# Patient Record
Sex: FEMALE | Race: WHITE | NOT HISPANIC OR LATINO | Employment: FULL TIME | URBAN - METROPOLITAN AREA
[De-identification: names, ages, dates, MRNs, and addresses within clinical notes are randomized per-mention and may not be internally consistent; named-entity substitution may affect disease eponyms.]

---

## 2017-10-11 ENCOUNTER — TRANSCRIBE ORDERS (OUTPATIENT)
Dept: ADMINISTRATIVE | Facility: HOSPITAL | Age: 42
End: 2017-10-11

## 2017-10-11 DIAGNOSIS — Z12.31 ENCOUNTER FOR MAMMOGRAM TO ESTABLISH BASELINE MAMMOGRAM: Primary | ICD-10-CM

## 2017-10-30 ENCOUNTER — APPOINTMENT (OUTPATIENT)
Dept: MAMMOGRAPHY | Facility: HOSPITAL | Age: 42
End: 2017-10-30
Attending: INTERNAL MEDICINE

## 2017-11-03 ENCOUNTER — HOSPITAL ENCOUNTER (OUTPATIENT)
Dept: MAMMOGRAPHY | Facility: HOSPITAL | Age: 42
Discharge: HOME | End: 2017-11-03
Attending: INTERNAL MEDICINE | Admitting: INTERNAL MEDICINE

## 2017-11-03 DIAGNOSIS — Z12.31 ENCOUNTER FOR MAMMOGRAM TO ESTABLISH BASELINE MAMMOGRAM: ICD-10-CM

## 2017-11-03 PROCEDURE — G0202 SCR MAMMO BI INCL CAD: HCPCS

## 2017-11-13 ENCOUNTER — APPOINTMENT (OUTPATIENT)
Dept: GENERAL RADIOLOGY | Facility: HOSPITAL | Age: 42
End: 2017-11-13

## 2017-11-13 PROCEDURE — 71020 XR CHEST 2 VW: CPT | Performed by: FAMILY MEDICINE

## 2017-11-13 PROCEDURE — 71020 HC CHEST PA AND LATERAL: CPT | Performed by: FAMILY MEDICINE

## 2018-05-11 ENCOUNTER — OFFICE VISIT (OUTPATIENT)
Dept: FAMILY MEDICINE CLINIC | Facility: CLINIC | Age: 43
End: 2018-05-11

## 2018-05-11 VITALS
TEMPERATURE: 98.4 F | DIASTOLIC BLOOD PRESSURE: 88 MMHG | SYSTOLIC BLOOD PRESSURE: 120 MMHG | WEIGHT: 256 LBS | HEART RATE: 95 BPM | OXYGEN SATURATION: 100 % | HEIGHT: 67 IN | BODY MASS INDEX: 40.18 KG/M2 | RESPIRATION RATE: 16 BRPM

## 2018-05-11 DIAGNOSIS — IMO0001 CLASS 3 OBESITY WITHOUT SERIOUS COMORBIDITY WITH BODY MASS INDEX (BMI) OF 40.0 TO 44.9 IN ADULT, UNSPECIFIED OBESITY TYPE: ICD-10-CM

## 2018-05-11 DIAGNOSIS — Z83.71 FAMILY HISTORY OF COLONIC POLYPS: ICD-10-CM

## 2018-05-11 DIAGNOSIS — R00.2 PALPITATIONS: ICD-10-CM

## 2018-05-11 DIAGNOSIS — G43.109 MIGRAINE WITH AURA AND WITHOUT STATUS MIGRAINOSUS, NOT INTRACTABLE: ICD-10-CM

## 2018-05-11 DIAGNOSIS — N92.6 IRREGULAR MENSES: ICD-10-CM

## 2018-05-11 DIAGNOSIS — I10 ESSENTIAL HYPERTENSION: Primary | ICD-10-CM

## 2018-05-11 LAB
ALBUMIN SERPL-MCNC: 4.7 G/DL (ref 3.5–5.2)
ALBUMIN/GLOB SERPL: 1.6 G/DL
ALP SERPL-CCNC: 94 U/L (ref 39–117)
ALT SERPL-CCNC: 13 U/L (ref 1–33)
AST SERPL-CCNC: 13 U/L (ref 1–32)
BASOPHILS # BLD AUTO: 0.03 10*3/MM3 (ref 0–0.2)
BASOPHILS NFR BLD AUTO: 0.4 % (ref 0–1.5)
BILIRUB SERPL-MCNC: 0.2 MG/DL (ref 0.1–1.2)
BUN SERPL-MCNC: 7 MG/DL (ref 6–20)
BUN/CREAT SERPL: 11.7 (ref 7–25)
CALCIUM SERPL-MCNC: 9.4 MG/DL (ref 8.6–10.5)
CHLORIDE SERPL-SCNC: 101 MMOL/L (ref 98–107)
CHOLEST SERPL-MCNC: 198 MG/DL (ref 0–200)
CO2 SERPL-SCNC: 27.6 MMOL/L (ref 22–29)
CREAT SERPL-MCNC: 0.6 MG/DL (ref 0.57–1)
EOSINOPHIL # BLD AUTO: 0.32 10*3/MM3 (ref 0–0.7)
EOSINOPHIL NFR BLD AUTO: 4.3 % (ref 0.3–6.2)
ERYTHROCYTE [DISTWIDTH] IN BLOOD BY AUTOMATED COUNT: 15.2 % (ref 11.7–13)
GFR SERPLBLD CREATININE-BSD FMLA CKD-EPI: 110 ML/MIN/1.73
GFR SERPLBLD CREATININE-BSD FMLA CKD-EPI: 134 ML/MIN/1.73
GLOBULIN SER CALC-MCNC: 2.9 GM/DL
GLUCOSE SERPL-MCNC: 87 MG/DL (ref 65–99)
HCT VFR BLD AUTO: 37.6 % (ref 35.6–45.5)
HDLC SERPL-MCNC: 52 MG/DL (ref 40–60)
HGB BLD-MCNC: 11.2 G/DL (ref 11.9–15.5)
IMM GRANULOCYTES # BLD: 0 10*3/MM3 (ref 0–0.03)
IMM GRANULOCYTES NFR BLD: 0 % (ref 0–0.5)
LDLC SERPL CALC-MCNC: 120 MG/DL (ref 0–100)
LYMPHOCYTES # BLD AUTO: 2.07 10*3/MM3 (ref 0.9–4.8)
LYMPHOCYTES NFR BLD AUTO: 27.9 % (ref 19.6–45.3)
MCH RBC QN AUTO: 25.5 PG (ref 26.9–32)
MCHC RBC AUTO-ENTMCNC: 29.8 G/DL (ref 32.4–36.3)
MCV RBC AUTO: 85.5 FL (ref 80.5–98.2)
MONOCYTES # BLD AUTO: 0.65 10*3/MM3 (ref 0.2–1.2)
MONOCYTES NFR BLD AUTO: 8.7 % (ref 5–12)
NEUTROPHILS # BLD AUTO: 4.36 10*3/MM3 (ref 1.9–8.1)
NEUTROPHILS NFR BLD AUTO: 58.7 % (ref 42.7–76)
PLATELET # BLD AUTO: 452 10*3/MM3 (ref 140–500)
POTASSIUM SERPL-SCNC: 4.4 MMOL/L (ref 3.5–5.2)
PROT SERPL-MCNC: 7.6 G/DL (ref 6–8.5)
RBC # BLD AUTO: 4.4 10*6/MM3 (ref 3.9–5.2)
SODIUM SERPL-SCNC: 140 MMOL/L (ref 136–145)
TRIGL SERPL-MCNC: 132 MG/DL (ref 0–150)
TSH SERPL DL<=0.005 MIU/L-ACNC: 1.44 MIU/ML (ref 0.27–4.2)
VLDLC SERPL CALC-MCNC: 26.4 MG/DL (ref 5–40)
WBC # BLD AUTO: 7.43 10*3/MM3 (ref 4.5–10.7)

## 2018-05-11 PROCEDURE — 99214 OFFICE O/P EST MOD 30 MIN: CPT | Performed by: FAMILY MEDICINE

## 2018-05-11 PROCEDURE — 93000 ELECTROCARDIOGRAM COMPLETE: CPT | Performed by: FAMILY MEDICINE

## 2018-05-11 PROCEDURE — 99396 PREV VISIT EST AGE 40-64: CPT | Performed by: FAMILY MEDICINE

## 2018-05-11 RX ORDER — SUMATRIPTAN 50 MG/1
TABLET, FILM COATED ORAL
Qty: 9 TABLET | Refills: 0 | Status: SHIPPED | OUTPATIENT
Start: 2018-05-11 | End: 2019-06-15

## 2018-05-11 NOTE — PROGRESS NOTES
Subjective   Radha Torre is a 42 y.o. female.     History of Present Illness     {Common H&P Review Areas:19883}    Review of Systems   Constitutional: Negative.    HENT: Negative.    Eyes: Negative.    Respiratory: Negative.    Cardiovascular: Negative.    Gastrointestinal: Negative.    Endocrine: Negative.    Genitourinary: Negative.    Musculoskeletal: Negative.    Skin: Negative.    Allergic/Immunologic: Negative.    Neurological: Negative.    Hematological: Negative.    Psychiatric/Behavioral: Negative.        Objective   Physical Exam    Assessment/Plan   {Assess/PlanSmartLinks:33287}

## 2018-05-11 NOTE — PROGRESS NOTES
Procedure     ECG 12 Lead  Date/Time: 5/11/2018 10:25 AM  Performed by: YOVANY ROBERTSON  Authorized by: YOVANY ROBERTSON   Interpreted by ED physician  Comparison: not compared with previous ECG   Previous ECG: no previous ECG available  Rhythm: sinus rhythm and sinus bradycardia  Rate: normal  BPM: 87  Conduction: conduction normal  ST Segments: ST segments normal  T Waves: T waves normal  QRS axis: normal  Other: no other findings  Clinical impression: normal ECG

## 2018-05-11 NOTE — PATIENT INSTRUCTIONS
Read YOUNGER NEXT YEAR    Work on diet and exercise as we discussed, working up to 150 minutes of aerobic activity weekly accompanied by resistance/strength training for muscles.  Carbohydrate restriction (decreasing starches/sugars) and limiting saturated fats is important.      Try sumatriptan for headaches.  Some patients experience slight chest tightness with this; it is not a problem unless severe/prolonged.  Advise if problems, but I do not anticipate problems with it.    Zyrtec/cetirizine daily  Flonase/fluticasone nasal steroid spray as discussed    IT IS VERY IMPORTANT THAT YOU KEEP A PRINTED LIST OF ALL OF YOUR MEDICATIONS AND DOSES AND OF ALL MEDICATION ALLERGIES WITH YOU/ON YOUR PERSON AT ALL TIMES.  THIS IS OF CRITICAL IMPORTANCE IN THE EVENT THAT YOU ARE INJURED OR ILL AND ARE UNABLE TO CONVEY THIS INFORMATION TO THOSE CARING FOR YOU IN AN EMERGENCY SITUATION.      Schedule recheck in 3 months

## 2018-05-11 NOTE — PROGRESS NOTES
Subjective   Radha Torre is a 42 y.o. female.     History of Present Illness     {Common H&P Review Areas:95211}    Review of Systems   Constitutional: Negative.    HENT: Negative.    Eyes: Negative.    Respiratory: Negative.    Cardiovascular: Negative.    Gastrointestinal: Negative.    Endocrine: Negative.    Genitourinary: Negative.    Musculoskeletal: Negative.    Skin: Negative.    Allergic/Immunologic: Negative.    Neurological: Negative.    Hematological: Negative.    Psychiatric/Behavioral: Negative.        Objective   Physical Exam    Assessment/Plan   {Assess/PlanSmartLinks:83495}

## 2018-05-11 NOTE — PROGRESS NOTES
Subjective   Radha Torre is a 42 y.o. female.     Comes in to establish care and for evaluation of palpitations and other concerns.  I saw her previously in urgent care with bronchitis associated with bronchospasm.  She still periodically has some mild bronchospasm with wheeze which tends to be associated with her allergies.  Seasonal nasal symptoms with postnasal drainage and ear pressure.  Generic loratadine has not been terribly helpful with that.  She complains of periodic mild palpitations which seem to be associated with stress.  It feels like her heart skips a beat. No chest pain dyspnea edema or other cardiovascular symptoms.  She has an appointment with her gynecologist to evaluate heavy and irregular periods.  She really does not get any regular exercise at this point but wants to start exercising and lose some weight.  Not had any sudden or unexpected weight gain.      She has a history of migraines but has not been treated specifically for them.  Diagnosed as a migraine headache however.  She gets a visual aura and then a throbbing left temporal headache associated with photophobia, phonophobia and nausea and vomiting.     Her tetanus is up-to-date.  She has been advised that because of her mother's colon polyps that she needs to start colonoscopies early.  She has no rectal bleeding or other symptoms referable to that.         The following portions of the patient's history were reviewed and updated as appropriate: allergies, current medications, past medical history, past social history, past surgical history and problem list.    Review of Systems   Constitutional: Negative for chills and fever.   HENT: Positive for congestion, postnasal drip and rhinorrhea. Negative for sore throat.    Eyes: Negative for discharge and visual disturbance.   Respiratory: Positive for wheezing. Negative for cough and shortness of breath.    Cardiovascular: Positive for palpitations. Negative for chest pain.    Gastrointestinal: Negative for abdominal pain, constipation, diarrhea, nausea and vomiting.   Endocrine: Negative for polydipsia.   Genitourinary: Negative for dysuria and hematuria.   Musculoskeletal: Negative for arthralgias and myalgias.   Skin: Negative for rash.   Allergic/Immunologic: Negative.    Neurological: Positive for headaches. Negative for weakness and numbness.   Hematological: Does not bruise/bleed easily.   Psychiatric/Behavioral: Negative for dysphoric mood. The patient is not nervous/anxious.        Objective   Physical Exam   Constitutional: She is oriented to person, place, and time. She appears well-developed and well-nourished.   HENT:   Head: Normocephalic and atraumatic.   Right Ear: External ear normal.   Left Ear: External ear normal.   Mouth/Throat: No oropharyngeal exudate.   Eyes: Conjunctivae, EOM and lids are normal. Pupils are equal, round, and reactive to light. Right eye exhibits no discharge. Left eye exhibits no discharge. No scleral icterus.   Neck: Trachea normal, normal range of motion and phonation normal. Neck supple. No thyromegaly present.   Cardiovascular: Normal rate, regular rhythm and normal heart sounds.  Exam reveals no gallop and no friction rub.    No murmur heard.  Pulmonary/Chest: Effort normal and breath sounds normal. No stridor. She has no wheezes. She has no rales.   Abdominal: Soft. She exhibits no distension. There is no tenderness.   Musculoskeletal: Normal range of motion. She exhibits no edema.   Lymphadenopathy:     She has no cervical adenopathy.   Neurological: She is alert and oriented to person, place, and time. She has normal strength. No cranial nerve deficit.   Skin: Skin is warm, dry and intact. No petechiae and no rash noted. No cyanosis. Nails show no clubbing.   Psychiatric: She has a normal mood and affect. Her speech is normal and behavior is normal. Judgment and thought content normal. Cognition and memory are normal.   Nursing note and  vitals reviewed.      Assessment/Plan   Radha was seen today for establish care and annual exam.    Diagnoses and all orders for this visit:    Essential hypertension  -     CBC & Differential  -     Comprehensive Metabolic Panel  -     Lipid Panel    Class 3 obesity without serious comorbidity with body mass index (BMI) of 40.0 to 44.9 in adult, unspecified obesity type  -     CBC & Differential  -     Comprehensive Metabolic Panel  -     Lipid Panel  -     TSH Rfx On Abnormal To Free T4    Irregular menses  -     CBC & Differential    Palpitations  -     ECG 12 Lead  -     TSH Rfx On Abnormal To Free T4    Family history of colonic polyps  -     Ambulatory Referral For Screening Colonoscopy    Migraine with aura and without status migrainosus, not intractable  -     SUMAtriptan (IMITREX) 50 MG tablet; Take one tablet at onset of headache. May repeat dose one time in 2 hours if headache not relieved.      Patient Instructions   Read YOUNGER NEXT YEAR    Work on diet and exercise as we discussed, working up to 150 minutes of aerobic activity weekly accompanied by resistance/strength training for muscles.  Carbohydrate restriction (decreasing starches/sugars) and limiting saturated fats is important.      Try sumatriptan for headaches.  Some patients experience slight chest tightness with this; it is not a problem unless severe/prolonged.  Advise if problems, but I do not anticipate problems with it.    Zyrtec/cetirizine daily  Flonase/fluticasone nasal steroid spray as discussed    IT IS VERY IMPORTANT THAT YOU KEEP A PRINTED LIST OF ALL OF YOUR MEDICATIONS AND DOSES AND OF ALL MEDICATION ALLERGIES WITH YOU/ON YOUR PERSON AT ALL TIMES.  THIS IS OF CRITICAL IMPORTANCE IN THE EVENT THAT YOU ARE INJURED OR ILL AND ARE UNABLE TO CONVEY THIS INFORMATION TO THOSE CARING FOR YOU IN AN EMERGENCY SITUATION.      Schedule recheck in 3 months    Will reassess BP at next visit and hopefully nonpharmacologic interventions  will handle it.   EMR Dragon/Transcription disclaimer:   Much of this encounter note is an electronic transcription/translation of spoken language to printed text. The electronic translation of spoken language may permit erroneous, or at times, nonsensical words or phrases to be inadvertently transcribed; Although I have reviewed the note for such errors, some may still exist. Please contact me with any questions or concerns about the conduct of this encounter note.

## 2018-05-14 ENCOUNTER — OFFICE VISIT (OUTPATIENT)
Dept: OBSTETRICS AND GYNECOLOGY | Age: 43
End: 2018-05-14

## 2018-05-14 VITALS
SYSTOLIC BLOOD PRESSURE: 128 MMHG | WEIGHT: 255 LBS | BODY MASS INDEX: 40.02 KG/M2 | HEIGHT: 67 IN | DIASTOLIC BLOOD PRESSURE: 82 MMHG

## 2018-05-14 DIAGNOSIS — Z01.419 WELL WOMAN EXAM WITH ROUTINE GYNECOLOGICAL EXAM: Primary | ICD-10-CM

## 2018-05-14 DIAGNOSIS — N92.4 EXCESSIVE BLEEDING IN PREMENOPAUSAL PERIOD: ICD-10-CM

## 2018-05-14 DIAGNOSIS — D50.9 IRON DEFICIENCY ANEMIA, UNSPECIFIED IRON DEFICIENCY ANEMIA TYPE: ICD-10-CM

## 2018-05-14 PROCEDURE — 99386 PREV VISIT NEW AGE 40-64: CPT | Performed by: PHYSICIAN ASSISTANT

## 2018-05-14 NOTE — PROGRESS NOTES
"Subjective     Chief Complaint   Patient presents with   • Gynecologic Exam     new annual, c/o irregular cycleys 2-3 days of cycle are so heavy she can't do anything , last pap2013 neg/ no hpv done, m/g 2017       History of Present Illness    Radha Torre is a 42 y.o.  who presents for annual exam.    She has not been seen since , she is noting a heavy periods. They are so heavy that the first 2 days she can't leave the house.  She uses 1 ultra tampon an hour    Her menses are regular every 21-24 days, lasting >7 days, dysmenorrhea mild, occurring throughout menses   Obstetric History:  OB History      Para Term  AB Living    3 3 3   3    SAB TAB Ectopic Molar Multiple Live Births         3         Menstrual History:     Patient's last menstrual period was 2018 (exact date).         Current contraception: tubal ligation  History of abnormal Pap smear: no  Received Gardasil immunization: no  Perform regular self breast exam: yes - mthly  Family history of uterine or ovarian cancer: no  Family History of colon cancer: no  Family history of breast cancer: no    Mammogram: up to date.  Colonoscopy: not indicated.  DEXA: not indicated.    Exercise: not active  Calcium/Vitamin D: adequate intake    The following portions of the patient's history were reviewed and updated as appropriate: allergies, current medications, past family history, past medical history, past social history, past surgical history and problem list.    Review of Systems   All other systems reviewed and are negative.      Review of Systems   Constitutional: Negative for fatigue.   Respiratory: Negative for shortness of breath.    Gastrointestinal: Negative for abdominal pain.   Genitourinary: Negative for dysuria.   Neurological: Negative for headaches.   Psychiatric/Behavioral: Negative for dysphoric mood.         Objective   Physical Exam    /82   Ht 170.2 cm (67\")   Wt 116 kg (255 lb)   LMP " 05/03/2018 (Exact Date)   BMI 39.94 kg/m²     General:   alert, appears stated age and cooperative   Neck: no adenopathy and thyroid normal to palpation   Heart: regular rate and rhythm   Lungs: clear to auscultation bilaterally   Abdomen: soft and nontender   Breast: inspection negative, no nipple discharge or bleeding, no masses or nodularity palpable   Vulva: normal   Vagina: normal mucosa, normal discharge   Cervix: no lesions   Uterus: normal size, non-tender, difficult to clearly palpate   Adnexa: normal adnexa and no mass, fullness, tenderness difficult to clearly palpate   Rectal: not indicated     Assessment/Plan   Radha was seen today for gynecologic exam.    Diagnoses and all orders for this visit:    Well woman exam with routine gynecological exam    Iron deficiency anemia, unspecified iron deficiency anemia type    Excessive bleeding in premenopausal period        All questions answered.  Breast self exam technique reviewed and patient encouraged to perform self-exam monthly.  Discussed healthy lifestyle modifications.  Recommended 30 minutes of aerobic exercise five times per week.  Discussed calcium needs to prevent osteoporosis.    Disc pap guidelines, pap and HPV done today  Disc options to tx heavy menses, BCP, IUD, Lysteda, Ablation. She is to come back for an u/s to further eval uterine lining first. Then consider all options discussed. Gave HO to review. Labs already done at PCP.  Enc iron supplementation and to take an otc supplement.  Disc otc ibuprofen to try in meantime.

## 2018-05-16 LAB
CYTOLOGIST CVX/VAG CYTO: NORMAL
CYTOLOGY CVX/VAG DOC THIN PREP: NORMAL
DX ICD CODE: NORMAL
HIV 1 & 2 AB SER-IMP: NORMAL
HPV I/H RISK 4 DNA CVX QL PROBE+SIG AMP: NEGATIVE
OTHER STN SPEC: NORMAL
PATH REPORT.FINAL DX SPEC: NORMAL
STAT OF ADQ CVX/VAG CYTO-IMP: NORMAL

## 2018-05-18 ENCOUNTER — TELEPHONE (OUTPATIENT)
Dept: OBSTETRICS AND GYNECOLOGY | Age: 43
End: 2018-05-18

## 2018-05-18 NOTE — TELEPHONE ENCOUNTER
----- Message from HANNAH Gama sent at 5/18/2018  9:35 AM EDT -----  Let her know her pap and hpv  Result is wnl

## 2018-06-13 ENCOUNTER — PREP FOR SURGERY (OUTPATIENT)
Dept: OTHER | Facility: HOSPITAL | Age: 43
End: 2018-06-13

## 2018-06-13 ENCOUNTER — OFFICE VISIT (OUTPATIENT)
Dept: OBSTETRICS AND GYNECOLOGY | Age: 43
End: 2018-06-13

## 2018-06-13 ENCOUNTER — PROCEDURE VISIT (OUTPATIENT)
Dept: OBSTETRICS AND GYNECOLOGY | Age: 43
End: 2018-06-13

## 2018-06-13 VITALS
WEIGHT: 257 LBS | BODY MASS INDEX: 40.34 KG/M2 | DIASTOLIC BLOOD PRESSURE: 72 MMHG | HEIGHT: 67 IN | SYSTOLIC BLOOD PRESSURE: 122 MMHG

## 2018-06-13 DIAGNOSIS — N92.0 MENORRHAGIA WITH REGULAR CYCLE: ICD-10-CM

## 2018-06-13 DIAGNOSIS — N92.0 MENORRHAGIA WITH REGULAR CYCLE: Primary | ICD-10-CM

## 2018-06-13 DIAGNOSIS — D50.0 IRON DEFICIENCY ANEMIA DUE TO CHRONIC BLOOD LOSS: ICD-10-CM

## 2018-06-13 DIAGNOSIS — N92.4 EXCESSIVE BLEEDING IN PREMENOPAUSAL PERIOD: Primary | ICD-10-CM

## 2018-06-13 DIAGNOSIS — IMO0001 CLASS 3 OBESITY WITHOUT SERIOUS COMORBIDITY WITH BODY MASS INDEX (BMI) OF 40.0 TO 44.9 IN ADULT, UNSPECIFIED OBESITY TYPE: Primary | ICD-10-CM

## 2018-06-13 PROCEDURE — 99214 OFFICE O/P EST MOD 30 MIN: CPT | Performed by: OBSTETRICS & GYNECOLOGY

## 2018-06-13 PROCEDURE — 76830 TRANSVAGINAL US NON-OB: CPT | Performed by: OBSTETRICS & GYNECOLOGY

## 2018-06-13 RX ORDER — SODIUM CHLORIDE 0.9 % (FLUSH) 0.9 %
1-10 SYRINGE (ML) INJECTION AS NEEDED
Status: CANCELLED | OUTPATIENT
Start: 2018-07-25

## 2018-06-13 NOTE — PROGRESS NOTES
Subjective     Chief Complaint   Patient presents with   • Gynecologic Exam     Gyn u/s:heavy menses       History of Present Illness  Radha Torre is a 43 y.o. female is being seen today for Follow-up evaluation of her worsening menorrhagia. Periods often can last for 10 days and 2-3 days extremely heavy which sometimes interrupt her ability to perform daily functions. She is also has some iron deficiency anemia associated with this in the past  Chief Complaint   Patient presents with   • Gynecologic Exam     Gyn u/s:heavy menses   .        The following portions of the patient's history were reviewed and updated as appropriate: allergies, current medications, past family history, past medical history, past social history, past surgical history and problem list.    PAST MEDICAL HISTORY  Past Medical History:   Diagnosis Date   • Heavy menses    • Migraine    • Seasonal allergic rhinitis      OB History      Para Term  AB Living    3 3 3     3    SAB TAB Ectopic Molar Multiple Live Births              3        Past Surgical History:   Procedure Laterality Date   •  SECTION     • TUBAL ABDOMINAL LIGATION     • WISDOM TOOTH EXTRACTION       Family History   Problem Relation Age of Onset   • Stroke Mother    • Skin cancer Mother         BCE SCE   • Colon polyps Mother    • Hyperlipidemia Maternal Grandmother    • Hypertension Maternal Grandmother    • Heart disease Maternal Grandmother    • Coronary artery disease Maternal Grandfather    • Breast cancer Neg Hx    • Colon cancer Neg Hx      History   Smoking Status   • Former Smoker   • Packs/day: 0.50   • Years: 3.00   • Quit date: 2001   Smokeless Tobacco   • Never Used       Current Outpatient Prescriptions:   •  SUMAtriptan (IMITREX) 50 MG tablet, Take one tablet at onset of headache. May repeat dose one time in 2 hours if headache not relieved., Disp: 9 tablet, Rfl: 0  Immunization History   Administered Date(s) Administered   •  Tdap 01/01/2013       Review of Systems       Except as outlined in history of physical illness, patient denies any changes in her GYN, , GI systems. All other systems reviewed are negative.    Objective   Physical Exam   Alert and oriented, respirations unlabored, heart regular rate and rhythm   PelvicExtra genitalia normal vagina cervix uterus adnexa normal      Assessment/Plan   Radha was seen today for gynecologic exam.    Diagnoses and all orders for this visit:    Class 3 obesity without serious comorbidity with body mass index (BMI) of 40.0 to 44.9 in adult, unspecified obesity type    Menorrhagia with regular cycle    Iron deficiency anemia due to chronic blood loss    Today's ultrasound shows normal endometrium given patient's age. Ovaries appeared normal. After reviewing potential risks benefits alternatives patient wants to proceed with a D&C hysteroscopy endometrial ablation. We spent a lot of time talking about risks benefits potential complications failure and success rates of multiple procedures including birth control pills, or just and withdrawal, Mirena IUD, copper IUD, endometrial ablation, and hysterectomy. Patient was well informed and asked a lot of questions. We reviewed the risk of bleeding infection injury to other organs, the failure rate of an endometrial ablation as well as potential masking future endometrial problems. After discussing these in great detail she wants to proceed             EMR Dragon/ Transcription disclaimer:  Much of the encounter note is an electronic transcription/translation of spoken language to printed text. The electronic translation of spoken language may permit erroneous, or at times, nonessential words or phrases to be inadvertently transcribes; Although i have reviewed the note for such errors, some may still exist.

## 2018-07-23 ENCOUNTER — APPOINTMENT (OUTPATIENT)
Dept: PREADMISSION TESTING | Facility: HOSPITAL | Age: 43
End: 2018-07-23

## 2018-07-23 VITALS
SYSTOLIC BLOOD PRESSURE: 126 MMHG | OXYGEN SATURATION: 100 % | WEIGHT: 258 LBS | TEMPERATURE: 97.8 F | RESPIRATION RATE: 24 BRPM | DIASTOLIC BLOOD PRESSURE: 84 MMHG | HEIGHT: 67 IN | HEART RATE: 89 BPM | BODY MASS INDEX: 40.49 KG/M2

## 2018-07-23 DIAGNOSIS — N92.0 MENORRHAGIA WITH REGULAR CYCLE: ICD-10-CM

## 2018-07-23 LAB
ANION GAP SERPL CALCULATED.3IONS-SCNC: 11.5 MMOL/L
BASOPHILS # BLD AUTO: 0.03 10*3/MM3 (ref 0–0.2)
BASOPHILS NFR BLD AUTO: 0.4 % (ref 0–1.5)
BUN BLD-MCNC: 7 MG/DL (ref 6–20)
BUN/CREAT SERPL: 11.1 (ref 7–25)
CALCIUM SPEC-SCNC: 9.4 MG/DL (ref 8.6–10.5)
CHLORIDE SERPL-SCNC: 101 MMOL/L (ref 98–107)
CO2 SERPL-SCNC: 26.5 MMOL/L (ref 22–29)
CREAT BLD-MCNC: 0.63 MG/DL (ref 0.57–1)
DEPRECATED RDW RBC AUTO: 48.2 FL (ref 37–54)
EOSINOPHIL # BLD AUTO: 0.25 10*3/MM3 (ref 0–0.7)
EOSINOPHIL NFR BLD AUTO: 3.2 % (ref 0.3–6.2)
ERYTHROCYTE [DISTWIDTH] IN BLOOD BY AUTOMATED COUNT: 15.5 % (ref 11.7–13)
GFR SERPL CREATININE-BSD FRML MDRD: 104 ML/MIN/1.73
GLUCOSE BLD-MCNC: 95 MG/DL (ref 65–99)
HCG SERPL QL: NEGATIVE
HCT VFR BLD AUTO: 38.8 % (ref 35.6–45.5)
HGB BLD-MCNC: 12.3 G/DL (ref 11.9–15.5)
IMM GRANULOCYTES # BLD: 0.02 10*3/MM3 (ref 0–0.03)
IMM GRANULOCYTES NFR BLD: 0.3 % (ref 0–0.5)
LYMPHOCYTES # BLD AUTO: 1.84 10*3/MM3 (ref 0.9–4.8)
LYMPHOCYTES NFR BLD AUTO: 23.7 % (ref 19.6–45.3)
MCH RBC QN AUTO: 26.6 PG (ref 26.9–32)
MCHC RBC AUTO-ENTMCNC: 31.7 G/DL (ref 32.4–36.3)
MCV RBC AUTO: 84 FL (ref 80.5–98.2)
MONOCYTES # BLD AUTO: 0.53 10*3/MM3 (ref 0.2–1.2)
MONOCYTES NFR BLD AUTO: 6.8 % (ref 5–12)
NEUTROPHILS # BLD AUTO: 5.12 10*3/MM3 (ref 1.9–8.1)
NEUTROPHILS NFR BLD AUTO: 65.9 % (ref 42.7–76)
PLATELET # BLD AUTO: 408 10*3/MM3 (ref 140–500)
PMV BLD AUTO: 10.9 FL (ref 6–12)
POTASSIUM BLD-SCNC: 4.1 MMOL/L (ref 3.5–5.2)
RBC # BLD AUTO: 4.62 10*6/MM3 (ref 3.9–5.2)
SODIUM BLD-SCNC: 139 MMOL/L (ref 136–145)
WBC NRBC COR # BLD: 7.77 10*3/MM3 (ref 4.5–10.7)

## 2018-07-23 PROCEDURE — 80048 BASIC METABOLIC PNL TOTAL CA: CPT | Performed by: OBSTETRICS & GYNECOLOGY

## 2018-07-23 PROCEDURE — 36415 COLL VENOUS BLD VENIPUNCTURE: CPT

## 2018-07-23 PROCEDURE — 85025 COMPLETE CBC W/AUTO DIFF WBC: CPT | Performed by: OBSTETRICS & GYNECOLOGY

## 2018-07-23 PROCEDURE — 84703 CHORIONIC GONADOTROPIN ASSAY: CPT | Performed by: OBSTETRICS & GYNECOLOGY

## 2018-07-23 RX ORDER — DIPHENHYDRAMINE HCL 25 MG
25 CAPSULE ORAL EVERY 6 HOURS PRN
COMMUNITY
End: 2019-06-15

## 2018-07-23 RX ORDER — ACETAMINOPHEN 500 MG
500 TABLET ORAL EVERY 6 HOURS PRN
COMMUNITY
End: 2018-07-25 | Stop reason: HOSPADM

## 2018-07-23 NOTE — DISCHARGE INSTRUCTIONS
Take the following medications the morning of surgery with a small sip of water:  none        General Instructions:  • Do not eat solid food after midnight the night before surgery.  • You may drink clear liquids day of surgery but must stop at least one hour before your hospital arrival time.  • It is beneficial for you to have a clear drink that contains carbohydrates the day of surgery.  We suggest a 12 to 20 ounce bottle of Gatorade or Powerade for non-diabetic patients or a 12 to 20 ounce bottle of G2 or Powerade Zero for diabetic patients. (Pediatric patients, are not advised to drink a 12 to 20 ounce carbohydrate drink)    Clear liquids are liquids you can see through.  Nothing red in color.     Plain water                               Sports drinks  Sodas                                   Gelatin (Jell-O)  Fruit juices without pulp such as white grape juice and apple juice  Popsicles that contain no fruit or yogurt  Tea or coffee (no cream or milk added)  Gatorade / Powerade  G2 / Powerade Zero    • Infants may have breast milk up to four hours before surgery.  • Infants drinking formula may drink formula up to six hours before surgery.   • Patients who avoid smoking, chewing tobacco and alcohol for 4 weeks prior to surgery have a reduced risk of post-operative complications.  Quit smoking as many days before surgery as you can.  • Do not smoke, use chewing tobacco or drink alcohol the day of surgery.   • If applicable bring your C-PAP/ BI-PAP machine.  • Bring any papers given to you in the doctor’s office.  • Wear clean comfortable clothes and socks.  • Do not wear contact lenses or make-up.  Bring a case for your glasses.   • Bring crutches or walker if applicable.  • Remove all piercings.  Leave jewelry and any other valuables at home.  • Hair extensions with metal clips must be removed prior to surgery.  • The Pre-Admission Testing nurse will instruct you to bring medications if unable to obtain an  accurate list in Pre-Admission Testing.        If you were given a blood bank ID arm band remember to bring it with you the day of surgery.    Preventing a Surgical Site Infection:  • For 2 to 3 days before surgery, avoid shaving with a razor because the razor can irritate skin and make it easier to develop an infection.    • Any areas of open skin can increase the risk of a post-operative wound infection by allowing bacteria to enter and travel throughout the body.  Notify your surgeon if you have any skin wounds / rashes even if it is not near the expected surgical site.  The area will need assessed to determine if surgery should be delayed until it is healed.  • The night prior to surgery sleep in a clean bed with clean clothing.  Do not allow pets to sleep with you.  • Shower on the morning of surgery using a fresh bar of anti-bacterial soap (such as Dial) and clean washcloth.  Dry with a clean towel and dress in clean clothing.  • Ask your surgeon if you will be receiving antibiotics prior to surgery.  • Make sure you, your family, and all healthcare providers clean their hands with soap and water or an alcohol based hand  before caring for you or your wound.    Day of surgery:  Upon arrival, a Pre-op nurse and Anesthesiologist will review your health history, obtain vital signs, and answer questions you may have.  The only belongings needed at this time will be your home medications and if applicable your C-PAP/BI-PAP machine.  If you are staying overnight your family can leave the rest of your belongings in the car and bring them to your room later.  A Pre-op nurse will start an IV and you may receive medication in preparation for surgery, including something to help you relax.  Your family will be able to see you in the Pre-op area.  While you are in surgery your family should notify the waiting room  if they leave the waiting room area and provide a contact phone number.    Please be  aware that surgery does come with discomfort.  We want to make every effort to control your discomfort so please discuss any uncontrolled symptoms with your nurse.   Your doctor will most likely have prescribed pain medications.      If you are going home after surgery you will receive individualized written care instructions before being discharged.  A responsible adult must drive you to and from the hospital on the day of your surgery and stay with you for 24 hours.    If you are staying overnight following surgery, you will be transported to your hospital room following the recovery period.  [facility] has all private rooms.    You have received a list of surgical assistants for your reference.  If you have any questions please call Pre-Admission Testing at [phone].  Deductibles and co-payments are collected on the day of service. Please be prepared to pay the required co-pay, deductible or deposit on the day of service as defined by your plan.

## 2018-07-25 ENCOUNTER — ANESTHESIA (OUTPATIENT)
Dept: PERIOP | Facility: HOSPITAL | Age: 43
End: 2018-07-25

## 2018-07-25 ENCOUNTER — ANESTHESIA EVENT (OUTPATIENT)
Dept: PERIOP | Facility: HOSPITAL | Age: 43
End: 2018-07-25

## 2018-07-25 ENCOUNTER — HOSPITAL ENCOUNTER (OUTPATIENT)
Facility: HOSPITAL | Age: 43
Setting detail: HOSPITAL OUTPATIENT SURGERY
Discharge: HOME | End: 2018-07-25
Attending: OBSTETRICS & GYNECOLOGY | Admitting: OBSTETRICS & GYNECOLOGY

## 2018-07-25 VITALS
BODY MASS INDEX: 40.92 KG/M2 | TEMPERATURE: 97.8 F | RESPIRATION RATE: 16 BRPM | OXYGEN SATURATION: 100 % | DIASTOLIC BLOOD PRESSURE: 84 MMHG | WEIGHT: 261.25 LBS | SYSTOLIC BLOOD PRESSURE: 141 MMHG | HEART RATE: 98 BPM

## 2018-07-25 DIAGNOSIS — N92.0 MENORRHAGIA WITH REGULAR CYCLE: ICD-10-CM

## 2018-07-25 PROCEDURE — 88305 TISSUE EXAM BY PATHOLOGIST: CPT | Performed by: OBSTETRICS & GYNECOLOGY

## 2018-07-25 PROCEDURE — 25010000002 MIDAZOLAM PER 1 MG: Performed by: ANESTHESIOLOGY

## 2018-07-25 PROCEDURE — 25010000002 PROMETHAZINE PER 50 MG: Performed by: ANESTHESIOLOGY

## 2018-07-25 PROCEDURE — 25010000002 KETOROLAC TROMETHAMINE PER 15 MG: Performed by: NURSE ANESTHETIST, CERTIFIED REGISTERED

## 2018-07-25 PROCEDURE — 58563 HYSTEROSCOPY ABLATION: CPT | Performed by: OBSTETRICS & GYNECOLOGY

## 2018-07-25 PROCEDURE — 25010000002 ONDANSETRON PER 1 MG: Performed by: NURSE ANESTHETIST, CERTIFIED REGISTERED

## 2018-07-25 PROCEDURE — 25010000002 FENTANYL CITRATE (PF) 100 MCG/2ML SOLUTION: Performed by: NURSE ANESTHETIST, CERTIFIED REGISTERED

## 2018-07-25 PROCEDURE — S0260 H&P FOR SURGERY: HCPCS | Performed by: OBSTETRICS & GYNECOLOGY

## 2018-07-25 PROCEDURE — 25010000002 PROPOFOL 10 MG/ML EMULSION: Performed by: NURSE ANESTHETIST, CERTIFIED REGISTERED

## 2018-07-25 PROCEDURE — 25010000002 DEXAMETHASONE PER 1 MG: Performed by: NURSE ANESTHETIST, CERTIFIED REGISTERED

## 2018-07-25 PROCEDURE — 25010000002 FENTANYL CITRATE (PF) 100 MCG/2ML SOLUTION: Performed by: ANESTHESIOLOGY

## 2018-07-25 RX ORDER — NAPROXEN SODIUM 220 MG
220 TABLET ORAL 2 TIMES DAILY PRN
COMMUNITY
End: 2018-07-25 | Stop reason: HOSPADM

## 2018-07-25 RX ORDER — IBUPROFEN 600 MG/1
600 TABLET ORAL EVERY 6 HOURS PRN
Qty: 14 TABLET | Refills: 0 | OUTPATIENT
Start: 2018-07-25 | End: 2019-06-15

## 2018-07-25 RX ORDER — MAGNESIUM HYDROXIDE 1200 MG/15ML
LIQUID ORAL AS NEEDED
Status: DISCONTINUED | OUTPATIENT
Start: 2018-07-25 | End: 2018-07-25 | Stop reason: HOSPADM

## 2018-07-25 RX ORDER — IBUPROFEN 200 MG
400 TABLET ORAL EVERY 6 HOURS PRN
COMMUNITY
End: 2018-07-25 | Stop reason: HOSPADM

## 2018-07-25 RX ORDER — MIDAZOLAM HYDROCHLORIDE 1 MG/ML
2 INJECTION INTRAMUSCULAR; INTRAVENOUS
Status: DISCONTINUED | OUTPATIENT
Start: 2018-07-25 | End: 2018-07-25 | Stop reason: HOSPADM

## 2018-07-25 RX ORDER — PROMETHAZINE HYDROCHLORIDE 25 MG/ML
6.25 INJECTION, SOLUTION INTRAMUSCULAR; INTRAVENOUS ONCE
Status: COMPLETED | OUTPATIENT
Start: 2018-07-25 | End: 2018-07-25

## 2018-07-25 RX ORDER — EPHEDRINE SULFATE 50 MG/ML
5 INJECTION, SOLUTION INTRAVENOUS ONCE AS NEEDED
Status: DISCONTINUED | OUTPATIENT
Start: 2018-07-25 | End: 2018-07-25 | Stop reason: HOSPADM

## 2018-07-25 RX ORDER — SODIUM CHLORIDE 0.9 % (FLUSH) 0.9 %
1-10 SYRINGE (ML) INJECTION AS NEEDED
Status: DISCONTINUED | OUTPATIENT
Start: 2018-07-25 | End: 2018-07-25 | Stop reason: HOSPADM

## 2018-07-25 RX ORDER — SODIUM CHLORIDE, SODIUM LACTATE, POTASSIUM CHLORIDE, CALCIUM CHLORIDE 600; 310; 30; 20 MG/100ML; MG/100ML; MG/100ML; MG/100ML
9 INJECTION, SOLUTION INTRAVENOUS CONTINUOUS
Status: DISCONTINUED | OUTPATIENT
Start: 2018-07-25 | End: 2018-07-25 | Stop reason: HOSPADM

## 2018-07-25 RX ORDER — PROPOFOL 10 MG/ML
VIAL (ML) INTRAVENOUS AS NEEDED
Status: DISCONTINUED | OUTPATIENT
Start: 2018-07-25 | End: 2018-07-25 | Stop reason: SURG

## 2018-07-25 RX ORDER — SCOLOPAMINE TRANSDERMAL SYSTEM 1 MG/1
1 PATCH, EXTENDED RELEASE TRANSDERMAL ONCE
Status: DISCONTINUED | OUTPATIENT
Start: 2018-07-25 | End: 2018-07-25 | Stop reason: HOSPADM

## 2018-07-25 RX ORDER — DEXAMETHASONE SODIUM PHOSPHATE 10 MG/ML
INJECTION INTRAMUSCULAR; INTRAVENOUS AS NEEDED
Status: DISCONTINUED | OUTPATIENT
Start: 2018-07-25 | End: 2018-07-25 | Stop reason: SURG

## 2018-07-25 RX ORDER — DIPHENHYDRAMINE HYDROCHLORIDE 50 MG/ML
6.25 INJECTION INTRAMUSCULAR; INTRAVENOUS
Status: DISCONTINUED | OUTPATIENT
Start: 2018-07-25 | End: 2018-07-25 | Stop reason: HOSPADM

## 2018-07-25 RX ORDER — ONDANSETRON 2 MG/ML
INJECTION INTRAMUSCULAR; INTRAVENOUS AS NEEDED
Status: DISCONTINUED | OUTPATIENT
Start: 2018-07-25 | End: 2018-07-25 | Stop reason: SURG

## 2018-07-25 RX ORDER — SODIUM CHLORIDE 9 MG/ML
INJECTION, SOLUTION INTRAVENOUS AS NEEDED
Status: DISCONTINUED | OUTPATIENT
Start: 2018-07-25 | End: 2018-07-25 | Stop reason: HOSPADM

## 2018-07-25 RX ORDER — FENTANYL CITRATE 50 UG/ML
100 INJECTION, SOLUTION INTRAMUSCULAR; INTRAVENOUS
Status: DISCONTINUED | OUTPATIENT
Start: 2018-07-25 | End: 2018-07-25 | Stop reason: HOSPADM

## 2018-07-25 RX ORDER — BUPIVACAINE HYDROCHLORIDE 2.5 MG/ML
INJECTION, SOLUTION EPIDURAL; INFILTRATION; INTRACAUDAL AS NEEDED
Status: DISCONTINUED | OUTPATIENT
Start: 2018-07-25 | End: 2018-07-25 | Stop reason: HOSPADM

## 2018-07-25 RX ORDER — FLUMAZENIL 0.1 MG/ML
0.2 INJECTION INTRAVENOUS AS NEEDED
Status: DISCONTINUED | OUTPATIENT
Start: 2018-07-25 | End: 2018-07-25 | Stop reason: HOSPADM

## 2018-07-25 RX ORDER — PROMETHAZINE HYDROCHLORIDE 25 MG/1
25 SUPPOSITORY RECTAL ONCE AS NEEDED
Status: DISCONTINUED | OUTPATIENT
Start: 2018-07-25 | End: 2018-07-25 | Stop reason: HOSPADM

## 2018-07-25 RX ORDER — FAMOTIDINE 10 MG/ML
20 INJECTION, SOLUTION INTRAVENOUS ONCE
Status: COMPLETED | OUTPATIENT
Start: 2018-07-25 | End: 2018-07-25

## 2018-07-25 RX ORDER — ONDANSETRON 2 MG/ML
4 INJECTION INTRAMUSCULAR; INTRAVENOUS ONCE AS NEEDED
Status: DISCONTINUED | OUTPATIENT
Start: 2018-07-25 | End: 2018-07-25 | Stop reason: HOSPADM

## 2018-07-25 RX ORDER — LIDOCAINE HYDROCHLORIDE 10 MG/ML
0.5 INJECTION, SOLUTION EPIDURAL; INFILTRATION; INTRACAUDAL; PERINEURAL ONCE AS NEEDED
Status: DISCONTINUED | OUTPATIENT
Start: 2018-07-25 | End: 2018-07-25 | Stop reason: HOSPADM

## 2018-07-25 RX ORDER — FENTANYL CITRATE 50 UG/ML
50 INJECTION, SOLUTION INTRAMUSCULAR; INTRAVENOUS
Status: DISCONTINUED | OUTPATIENT
Start: 2018-07-25 | End: 2018-07-25 | Stop reason: HOSPADM

## 2018-07-25 RX ORDER — HYDROMORPHONE HYDROCHLORIDE 1 MG/ML
0.5 INJECTION, SOLUTION INTRAMUSCULAR; INTRAVENOUS; SUBCUTANEOUS
Status: DISCONTINUED | OUTPATIENT
Start: 2018-07-25 | End: 2018-07-25 | Stop reason: HOSPADM

## 2018-07-25 RX ORDER — LABETALOL HYDROCHLORIDE 5 MG/ML
5 INJECTION, SOLUTION INTRAVENOUS
Status: DISCONTINUED | OUTPATIENT
Start: 2018-07-25 | End: 2018-07-25 | Stop reason: HOSPADM

## 2018-07-25 RX ORDER — FENTANYL CITRATE 50 UG/ML
INJECTION, SOLUTION INTRAMUSCULAR; INTRAVENOUS AS NEEDED
Status: DISCONTINUED | OUTPATIENT
Start: 2018-07-25 | End: 2018-07-25 | Stop reason: SURG

## 2018-07-25 RX ORDER — PROMETHAZINE HYDROCHLORIDE 25 MG/1
25 TABLET ORAL ONCE AS NEEDED
Status: DISCONTINUED | OUTPATIENT
Start: 2018-07-25 | End: 2018-07-25 | Stop reason: HOSPADM

## 2018-07-25 RX ORDER — PROMETHAZINE HYDROCHLORIDE 25 MG/ML
6.25 INJECTION, SOLUTION INTRAMUSCULAR; INTRAVENOUS ONCE AS NEEDED
Status: DISCONTINUED | OUTPATIENT
Start: 2018-07-25 | End: 2018-07-25 | Stop reason: HOSPADM

## 2018-07-25 RX ORDER — LIDOCAINE HYDROCHLORIDE 20 MG/ML
INJECTION, SOLUTION INFILTRATION; PERINEURAL AS NEEDED
Status: DISCONTINUED | OUTPATIENT
Start: 2018-07-25 | End: 2018-07-25 | Stop reason: SURG

## 2018-07-25 RX ORDER — KETOROLAC TROMETHAMINE 30 MG/ML
INJECTION, SOLUTION INTRAMUSCULAR; INTRAVENOUS AS NEEDED
Status: DISCONTINUED | OUTPATIENT
Start: 2018-07-25 | End: 2018-07-25 | Stop reason: SURG

## 2018-07-25 RX ORDER — OXYCODONE HYDROCHLORIDE AND ACETAMINOPHEN 5; 325 MG/1; MG/1
2 TABLET ORAL ONCE AS NEEDED
Status: DISCONTINUED | OUTPATIENT
Start: 2018-07-25 | End: 2018-07-25 | Stop reason: HOSPADM

## 2018-07-25 RX ORDER — HYDROCODONE BITARTRATE AND ACETAMINOPHEN 7.5; 325 MG/1; MG/1
1 TABLET ORAL ONCE AS NEEDED
Status: COMPLETED | OUTPATIENT
Start: 2018-07-25 | End: 2018-07-25

## 2018-07-25 RX ORDER — OXYCODONE HYDROCHLORIDE AND ACETAMINOPHEN 5; 325 MG/1; MG/1
1 TABLET ORAL EVERY 6 HOURS PRN
Qty: 12 TABLET | Refills: 0 | Status: SHIPPED | OUTPATIENT
Start: 2018-07-25 | End: 2018-10-04

## 2018-07-25 RX ORDER — MIDAZOLAM HYDROCHLORIDE 1 MG/ML
1 INJECTION INTRAMUSCULAR; INTRAVENOUS
Status: DISCONTINUED | OUTPATIENT
Start: 2018-07-25 | End: 2018-07-25 | Stop reason: HOSPADM

## 2018-07-25 RX ADMIN — FENTANYL CITRATE 50 MCG: 50 INJECTION, SOLUTION INTRAMUSCULAR; INTRAVENOUS at 14:24

## 2018-07-25 RX ADMIN — PROPOFOL 200 MG: 10 INJECTION, EMULSION INTRAVENOUS at 13:42

## 2018-07-25 RX ADMIN — SODIUM CHLORIDE, POTASSIUM CHLORIDE, SODIUM LACTATE AND CALCIUM CHLORIDE 9 ML/HR: 600; 310; 30; 20 INJECTION, SOLUTION INTRAVENOUS at 11:51

## 2018-07-25 RX ADMIN — MIDAZOLAM 1 MG: 1 INJECTION INTRAMUSCULAR; INTRAVENOUS at 12:07

## 2018-07-25 RX ADMIN — MIDAZOLAM 1 MG: 1 INJECTION INTRAMUSCULAR; INTRAVENOUS at 13:32

## 2018-07-25 RX ADMIN — LIDOCAINE HYDROCHLORIDE 100 MG: 20 INJECTION, SOLUTION INFILTRATION; PERINEURAL at 13:42

## 2018-07-25 RX ADMIN — ONDANSETRON 4 MG: 2 INJECTION INTRAMUSCULAR; INTRAVENOUS at 13:57

## 2018-07-25 RX ADMIN — KETOROLAC TROMETHAMINE 30 MG: 30 INJECTION, SOLUTION INTRAMUSCULAR; INTRAVENOUS at 13:57

## 2018-07-25 RX ADMIN — FAMOTIDINE 20 MG: 10 INJECTION, SOLUTION INTRAVENOUS at 12:06

## 2018-07-25 RX ADMIN — SCOPOLAMINE 1 PATCH: 1 PATCH, EXTENDED RELEASE TRANSDERMAL at 12:04

## 2018-07-25 RX ADMIN — DEXAMETHASONE SODIUM PHOSPHATE 4 MG: 10 INJECTION INTRAMUSCULAR; INTRAVENOUS at 13:57

## 2018-07-25 RX ADMIN — HYDROCODONE BITARTRATE AND ACETAMINOPHEN 1 TABLET: 7.5; 325 TABLET ORAL at 14:36

## 2018-07-25 RX ADMIN — FENTANYL CITRATE 50 MCG: 50 INJECTION, SOLUTION INTRAMUSCULAR; INTRAVENOUS at 14:32

## 2018-07-25 RX ADMIN — FENTANYL CITRATE 50 MCG: 50 INJECTION INTRAMUSCULAR; INTRAVENOUS at 13:42

## 2018-07-25 RX ADMIN — PROMETHAZINE HYDROCHLORIDE 6.25 MG: 25 INJECTION INTRAMUSCULAR; INTRAVENOUS at 12:08

## 2018-07-25 RX ADMIN — FENTANYL CITRATE 50 MCG: 50 INJECTION INTRAMUSCULAR; INTRAVENOUS at 13:49

## 2018-07-25 NOTE — H&P
[facility]  Radha Torre  : 1975  MRN: [id number]  CSN: [id number]    History and Physical  No chief complaint on file.    Subjective   Radha Torre is a 43 y.o. year old  who present for surgery due to worsening menorrhagia, iron deficiency anemia, fatigue  .    Past Medical History:   Diagnosis Date   • Anesthesia complication     mother sick post op   • Anxiety    • Arthritis     rt hand long finger   • Heavy menses    • Migraine    • Nasal deformity, acquired     diff breathing   • Pain in elbow joint     bilateral   • Seasonal allergic rhinitis      Past Surgical History:   Procedure Laterality Date   •  SECTION      x 3   • TUBAL ABDOMINAL LIGATION     • WISDOM TOOTH EXTRACTION       Smoking status: Former Smoker                                                              Packs/day: 0.50      Years: 3.00         Quit date: 2001  Smokeless tobacco: Never Used                        No current facility-administered medications for this encounter.     Current Outpatient Prescriptions:   •  acetaminophen (TYLENOL) 500 MG tablet, Take 500 mg by mouth Every 6 (Six) Hours As Needed for Mild Pain ., Disp: , Rfl:   •  carbonyl iron (FEOSOL) 45 MG tablet tablet, Take 1 tablet by mouth Every Morning., Disp: , Rfl:   •  diphenhydrAMINE (BENADRYL) 25 mg capsule, Take 25 mg by mouth Every 6 (Six) Hours As Needed for Itching. sleep, Disp: , Rfl:   •  SUMAtriptan (IMITREX) 50 MG tablet, Take one tablet at onset of headache. May repeat dose one time in 2 hours if headache not relieved., Disp: 9 tablet, Rfl: 0    No Known Allergies    Review of Systems      Except as outlined in history of physical illness, patient denies any changes in her GYN, , GI systems. All other systems reviewed are negative.        Objective   There were no vitals taken for this visit.  General: well developed; well nourished  no acute distress  appears stated age   Heart: regular rate and rhythm, S1, S2  normal, no murmur, click, rub or gallop   Lungs: breathing is unlabored  clear to auscultation bilaterally   Abdomen: soft, non-tender; no masses  no umbilical or inginual hernias are present  no hepato-splenomegaly   Pelvis:: Clinical staff was present for exam  Sternal genitalia normal vagina clean dry intact cervix uterus adnexa normal rectal exams normal   Labs  Lab Results   Component Value Date     07/23/2018    HGB 12.3 07/23/2018    HCT 38.8 07/23/2018    WBC 7.77 07/23/2018     07/23/2018    K 4.1 07/23/2018     07/23/2018    CO2 26.5 07/23/2018    BUN 7 07/23/2018    CREATININE 0.63 07/23/2018    GLUCOSE 95 07/23/2018    ALBUMIN 4.70 05/11/2018    CALCIUM 9.4 07/23/2018    AST 13 05/11/2018    ALT 13 05/11/2018    BILITOT 0.2 05/11/2018        Assessment   1. Worsening menorrhagia  2. Anemia due to chronic blood loss     Plan   1. After reviewing risk benefits failure rates of this procedure potential anesthetic complications and, location is due to surgery and potential for masking future endometrial issues given patient's body mass index , a shouldn't wishes to proceed with hysteroscopy D&C endometrial ablation.    Puneet Carreno MD  7/25/2018  10:23 AM       EMR Dragon/ Transcription disclaimer:  Much of the encounter note is an electronic transcription/translation of spoken language to printed text. The electronic translation of spoken language may permit erroneous, or at times, nonessential words or phrases to be inadvertently transcribes; Although i have reviewed the note for such errors, some may still exist.

## 2018-07-25 NOTE — ANESTHESIA PROCEDURE NOTES
Airway  Urgency: elective    Airway not difficult    General Information and Staff    Patient location during procedure: OR  Anesthesiologist: ANA MAGANA  CRNA: JASMYN SINCLAIR    Indications and Patient Condition  Indications for airway management: airway protection    Preoxygenated: yes  MILS not maintained throughout  Mask difficulty assessment: 1 - vent by mask    Final Airway Details  Final airway type: supraglottic airway      Successful airway: classic  Size 4    Number of attempts at approach: 1    Additional Comments  Lma insertion appears atraumatic. Dentition intact.

## 2018-07-25 NOTE — ANESTHESIA PREPROCEDURE EVALUATION
Anesthesia Evaluation     Patient summary reviewed and Nursing notes reviewed   history of anesthetic complications: PONV  NPO Solid Status: > 8 hours             Airway   Mallampati: III  TM distance: >3 FB  Neck ROM: full  no difficulty expected  Dental - normal exam     Pulmonary - negative pulmonary ROS and normal exam   Cardiovascular - normal exam    (+) hypertension,       Neuro/Psych- negative ROS  GI/Hepatic/Renal/Endo    (+) morbid obesity,      Musculoskeletal (-) negative ROS    Abdominal  - normal exam   Substance History - negative use     OB/GYN negative ob/gyn ROS         Other                        Anesthesia Plan    ASA 3     general     intravenous induction   Anesthetic plan and risks discussed with patient.    Plan discussed with CRNA.

## 2018-07-25 NOTE — OP NOTE
HYSTEROSCOPYNOVASURE/D&C    Date of Procedure: 7/25/2018   Preoperative Diagnosis: Menorrhagia  Postoperative Diagnosis: Same  Procedure:Hysteroscopy, Novasure Ablation and D&C  Surgeon: Wai  Anesthesia: General  Estimated Blood Loss: Less than 5 cc  Specimens: Endometrial curettings  Findings: Normal uterine cavity   Complications: None    Description of Operative Procedure:  After insuring informed consent, the patient was taken into the operating room where general anesthesia was administered without difficulty. Pt was then placed in the dorsal lithotomy position and prepped and draped in the usual sterile fashion. Next a weighted speculum was placed in the vagina. Cervix was grasped with a Allis clamp. Or cc of Marcaine were injected on either side for a paracervical block Next, the cervix was dilated to a number 8 Luba dilator to accommodate the hysteroscope. The hysteroscope was passed into the intrauterine cavity without difficulty under direct visualization.    D&C:  Findings revealed a normal intrauterine cavity. Next, curettage was completed with care. Endometrial curettings were sent to pathology.    Novasure:  After D&C was completed, measurements of the uterine cavity were obtained. The total uterine length was 10 cm and and cervical length of 4 cm. The uterine cavity length was calculated to be 6 cm. After these measurements were taken, the Novasure device was passed into uterine cavity. Upon opening the array, a width of 3.5 cm was found. Next a cavity check was passed and the ablation was initiated and lasted 82 seconds. After completion, hysteroscope was re-introduced and global ablation was noted.   After complete, good hemostasis was noted and the procedure was ended. The Speculum and Allis were removed. All counts were correct.  Patient taken to recovery in stable condition.    Signed By: Puneet Carreno MD 7/25/2018 2:09 PM

## 2018-07-25 NOTE — ANESTHESIA POSTPROCEDURE EVALUATION
Patient: Radha Torre    Procedure Summary     Date:  07/25/18 Room / Location:   [facility]  /  [facility]    Anesthesia Start:  1339 Anesthesia Stop:  1406    Procedure:  DILATATION AND CURETTAGE HYSTEROSCOPY NOVASURE ENDOMETRIAL ABLATION (N/A Vagina) Diagnosis:       Menorrhagia with regular cycle      (Menorrhagia with regular cycle [N92.0])    Surgeon:  Puneet Carreno MD Provider:  Jean Pierre Bustamante MD    Anesthesia Type:  general ASA Status:  3          Anesthesia Type: general  Last vitals  BP   143/90 (07/25/18 1431)   Temp   36.4 °C (97.5 °F) (07/25/18 1405)   Pulse   74 (07/25/18 1431)   Resp   16 (07/25/18 1415)     SpO2   100 % (07/25/18 1431)     Post Anesthesia Care and Evaluation    Patient location during evaluation: bedside  Patient participation: complete - patient participated  Level of consciousness: awake  Pain score: 1  Pain management: adequate  Airway patency: patent  Anesthetic complications: No anesthetic complications    Cardiovascular status: acceptable  Respiratory status: acceptable  Hydration status: acceptable    Comments: --------------------            07/25/18               1431     --------------------   BP:       143/90     Pulse:      74       Resp:                Temp:                SpO2:      100%     --------------------

## 2018-07-26 LAB
CYTO UR: NORMAL
LAB AP CASE REPORT: NORMAL
PATH REPORT.FINAL DX SPEC: NORMAL
PATH REPORT.GROSS SPEC: NORMAL

## 2018-07-27 ENCOUNTER — TELEPHONE (OUTPATIENT)
Dept: OBSTETRICS AND GYNECOLOGY | Age: 43
End: 2018-07-27

## 2018-07-27 NOTE — TELEPHONE ENCOUNTER
----- Message from Puneet Carreno MD sent at 7/26/2018 12:13 PM EDT -----  Please notify pt. That labs are with in normal limits

## 2018-10-04 ENCOUNTER — TELEPHONE (OUTPATIENT)
Dept: OBSTETRICS AND GYNECOLOGY | Age: 43
End: 2018-10-04

## 2018-11-21 ENCOUNTER — PROCEDURE VISIT (OUTPATIENT)
Dept: OBSTETRICS AND GYNECOLOGY | Age: 43
End: 2018-11-21

## 2018-11-21 ENCOUNTER — OFFICE VISIT (OUTPATIENT)
Dept: OBSTETRICS AND GYNECOLOGY | Age: 43
End: 2018-11-21

## 2018-11-21 VITALS
SYSTOLIC BLOOD PRESSURE: 128 MMHG | WEIGHT: 257 LBS | HEIGHT: 67 IN | BODY MASS INDEX: 40.34 KG/M2 | DIASTOLIC BLOOD PRESSURE: 86 MMHG

## 2018-11-21 DIAGNOSIS — R10.30 LOWER ABDOMINAL PAIN: Primary | ICD-10-CM

## 2018-11-21 DIAGNOSIS — R31.9 HEMATURIA, UNSPECIFIED TYPE: ICD-10-CM

## 2018-11-21 LAB
BILIRUB BLD-MCNC: NEGATIVE MG/DL
CLARITY, POC: CLEAR
COLOR UR: YELLOW
GLUCOSE UR STRIP-MCNC: NEGATIVE MG/DL
KETONES UR QL: NEGATIVE
LEUKOCYTE EST, POC: NEGATIVE
NITRITE UR-MCNC: NEGATIVE MG/ML
PH UR: 6 [PH] (ref 5–8)
PROT UR STRIP-MCNC: NEGATIVE MG/DL
RBC # UR STRIP: ABNORMAL /UL
SP GR UR: 1.01 (ref 1–1.03)
UROBILINOGEN UR QL: NORMAL

## 2018-11-21 PROCEDURE — 76830 TRANSVAGINAL US NON-OB: CPT | Performed by: OBSTETRICS & GYNECOLOGY

## 2018-11-21 PROCEDURE — 81003 URINALYSIS AUTO W/O SCOPE: CPT | Performed by: PHYSICIAN ASSISTANT

## 2018-11-21 PROCEDURE — 99213 OFFICE O/P EST LOW 20 MIN: CPT | Performed by: PHYSICIAN ASSISTANT

## 2018-11-21 NOTE — PROGRESS NOTES
"Subjective     Chief Complaint   Patient presents with   • Gynecologic Exam     c/o abdominal pain and spotting,  had ablation in july       Radha Torre is a 43 y.o.  whose LMP is No LMP recorded. Patient has had an ablation. presents with right lower quadrant pain, occurs every other month  Has a h/o ablation in July  Really happy with the results, has a period but it is light  Is worried that maybe there is an infection causing the pain  Denies any other associated sx's  Pain is helped by heat and ibuprofen  Pt of Dr Carreno's  utd on her exams  Scheduled for u/s today      No Additional Complaints Reported    The following portions of the patient's history were reviewed and updated as appropriate:vital signs, allergies, current medications, past family history, past medical history, past social history, past surgical history and problem list      Review of Systems   Genitourinary:positive for rlq pain, intermittent     Objective      /86   Ht 170.2 cm (67\")   Wt 117 kg (257 lb)   Breastfeeding? No   BMI 40.25 kg/m²     Physical Exam    General:   alert, comfortable, no distress and morbidly obese   Heart: Not performed today   Lungs: Not performed today.   Breast: Not performed today   Neck: na   Abdomen: {Not performed today   CVA: Not performed today   Pelvis: Not performed today   Extremities: Not performed today   Neurologic: negative   Psychiatric: Normal affect, judgement, and mood       Lab Review   Labs: Urinalysis - with micro     Imaging   Ultrasound - Pelvic Vaginal endo thickness measures 3.71. b ovaries wnl    Assessment/Plan     ASSESSMENT  1. Lower abdominal pain    2. Hematuria, unspecified type        PLAN  1.   Orders Placed This Encounter   Procedures   • Urine Culture - Urine, Urine, Random Void   • POC Urinalysis Dipstick, Multipro       2. U/s is reassuring.  Suspect mittleschmerz and disc that she can c/w otc pain meds prn pain, consider low dose BCP to suppress ovarian " function or could try u/s therapy to see if there could be some scar tissue in the area causing some discomfort.  She is happy that we r/o infection (urine appears normal but will send off a culture) and will consider her options. She will call if she want to try any of the methods we discussed    Follow up: 7 month(s)    HANNAH Campbell  11/21/2018

## 2018-11-23 LAB
BACTERIA UR CULT: NORMAL
BACTERIA UR CULT: NORMAL

## 2019-02-25 ENCOUNTER — TRANSCRIBE ORDERS (OUTPATIENT)
Dept: ADMINISTRATIVE | Facility: HOSPITAL | Age: 44
End: 2019-02-25

## 2019-02-25 DIAGNOSIS — Z12.39 SCREENING BREAST EXAMINATION: Primary | ICD-10-CM

## 2019-03-22 ENCOUNTER — HOSPITAL ENCOUNTER (OUTPATIENT)
Dept: MAMMOGRAPHY | Facility: HOSPITAL | Age: 44
Discharge: HOME | End: 2019-03-22
Admitting: OBSTETRICS & GYNECOLOGY

## 2019-03-22 DIAGNOSIS — Z12.39 SCREENING BREAST EXAMINATION: ICD-10-CM

## 2019-03-22 PROCEDURE — 77063 BREAST TOMOSYNTHESIS BI: CPT

## 2019-03-22 PROCEDURE — 77067 SCR MAMMO BI INCL CAD: CPT

## 2019-06-22 ENCOUNTER — TELEPHONE (OUTPATIENT)
Dept: URGENT CARE | Facility: CLINIC | Age: 44
End: 2019-06-22

## 2019-06-22 DIAGNOSIS — R05.9 COUGH: Primary | ICD-10-CM

## 2019-06-22 RX ORDER — ALBUTEROL SULFATE 2.5 MG/3ML
2.5 SOLUTION RESPIRATORY (INHALATION) EVERY 6 HOURS PRN
Qty: 30 VIAL | Refills: 0 | Status: SHIPPED | OUTPATIENT
Start: 2019-06-22 | End: 2019-07-02 | Stop reason: SDUPTHER

## 2019-06-22 NOTE — TELEPHONE ENCOUNTER
Patient was seen by you on 6/15/19 for cough, she has been using the inhaler but would like to know if you can send her RX for nebulizer medication?  Please advise

## 2019-07-02 ENCOUNTER — HOSPITAL ENCOUNTER (OUTPATIENT)
Dept: GENERAL RADIOLOGY | Facility: HOSPITAL | Age: 44
Discharge: HOME | End: 2019-07-02
Admitting: NURSE PRACTITIONER

## 2019-07-02 ENCOUNTER — OFFICE VISIT (OUTPATIENT)
Dept: FAMILY MEDICINE CLINIC | Facility: CLINIC | Age: 44
End: 2019-07-02

## 2019-07-02 VITALS
TEMPERATURE: 98.8 F | SYSTOLIC BLOOD PRESSURE: 130 MMHG | HEART RATE: 112 BPM | OXYGEN SATURATION: 97 % | WEIGHT: 262.5 LBS | RESPIRATION RATE: 18 BRPM | DIASTOLIC BLOOD PRESSURE: 82 MMHG | BODY MASS INDEX: 41.11 KG/M2

## 2019-07-02 DIAGNOSIS — Z00.00 ROUTINE GENERAL MEDICAL EXAMINATION AT A HEALTH CARE FACILITY: ICD-10-CM

## 2019-07-02 DIAGNOSIS — R06.2 WHEEZING: ICD-10-CM

## 2019-07-02 DIAGNOSIS — R05.9 COUGH: Primary | ICD-10-CM

## 2019-07-02 PROCEDURE — 99214 OFFICE O/P EST MOD 30 MIN: CPT | Performed by: NURSE PRACTITIONER

## 2019-07-02 PROCEDURE — 71046 X-RAY EXAM CHEST 2 VIEWS: CPT

## 2019-07-02 PROCEDURE — 94640 AIRWAY INHALATION TREATMENT: CPT | Performed by: NURSE PRACTITIONER

## 2019-07-02 RX ORDER — ALBUTEROL SULFATE 90 UG/1
2 AEROSOL, METERED RESPIRATORY (INHALATION) EVERY 4 HOURS PRN
Qty: 1 INHALER | Refills: 3 | Status: SHIPPED | OUTPATIENT
Start: 2019-07-02 | End: 2022-07-01 | Stop reason: SDUPTHER

## 2019-07-02 RX ORDER — ALBUTEROL SULFATE 2.5 MG/3ML
2.5 SOLUTION RESPIRATORY (INHALATION) EVERY 6 HOURS PRN
Qty: 30 VIAL | Refills: 0 | Status: SHIPPED | OUTPATIENT
Start: 2019-07-02 | End: 2019-08-02 | Stop reason: SDUPTHER

## 2019-07-02 RX ORDER — IPRATROPIUM BROMIDE AND ALBUTEROL SULFATE 2.5; .5 MG/3ML; MG/3ML
3 SOLUTION RESPIRATORY (INHALATION) ONCE
Status: COMPLETED | OUTPATIENT
Start: 2019-07-02 | End: 2019-07-02

## 2019-07-02 RX ADMIN — IPRATROPIUM BROMIDE AND ALBUTEROL SULFATE 3 ML: 2.5; .5 SOLUTION RESPIRATORY (INHALATION) at 15:27

## 2019-07-02 NOTE — PATIENT INSTRUCTIONS
Bronchospasm, Adult  Bronchospasm is a tightening of the airways going into the lungs. During an episode, it may be harder to breathe. You may cough, and you may make a whistling sound when you breathe (wheeze).  This condition often affects people with asthma.  What are the causes?  This condition is caused by swelling and irritation in the airways. It can be triggered by:  · An infection (common).  · Seasonal allergies.  · An allergic reaction.  · Exercise.  · Irritants. These include pollution, cigarette smoke, strong odors, aerosol sprays, and paint fumes.  · Weather changes. Winds increase molds and pollens in the air. Cold air may cause swelling.  · Stress and emotional upset.    What are the signs or symptoms?  Symptoms of this condition include:  · Wheezing. If the episode was triggered by an allergy, wheezing may start right away or hours later.  · Nighttime coughing.  · Frequent or severe coughing with a simple cold.  · Chest tightness.  · Shortness of breath.  · Decreased ability to exercise.    How is this diagnosed?  This condition is usually diagnosed with a review of your medical history and a physical exam. Tests, such as lung function tests, are sometimes done to look for other conditions. The need for a chest X-ray depends on where the wheezing occurs and whether it is the first time you have wheezed.  How is this treated?  This condition may be treated with:  · Inhaled medicines. These open up the airways and help you breathe. They can be taken with an inhaler or a nebulizer device.  · Corticosteroid medicines. These may be given for severe bronchospasm, usually when it is associated with asthma.  · Avoiding triggers, such as irritants, infection, or allergies.    Follow these instructions at home:  Medicines  · Take over-the-counter and prescription medicines only as told by your health care provider.  · If you need to use an inhaler or nebulizer to take your medicine, ask your health care  provider to explain how to use it correctly. If you were given a spacer, always use it with your inhaler.  Lifestyle  · Reduce the number of triggers in your home. To do this:  ? Change your heating and air conditioning filter at least once a month.  ? Limit your use of fireplaces and wood stoves.  ? Do not smoke. Do not allow smoking in your home.  ? Avoid using perfumes and fragrances.  ? Get rid of pests, such as roaches and mice, and their droppings.  ? Remove any mold from your home.  ? Keep your house clean and dust free. Use unscented cleaning products.  ? Replace carpet with wood, tile, or vinyl bettie. Carpet can trap dander and dust.  ? Use allergy-proof pillows, mattress covers, and box spring covers.  ? Wash bed sheets and blankets every week in hot water. Dry them in a dryer.  ? Use blankets that are made of polyester or cotton.  ? Wash your hands often.  ? Do not allow pets in your bedroom.  · Avoid breathing in cold air when you exercise.  General instructions  · Have a plan for seeking medical care. Know when to call your health care provider and local emergency services, and where to get emergency care.  · Stay up to date on your immunizations.  · When you have an episode of bronchospasm, stay calm. Try to relax and breathe more slowly.  · If you have asthma, make sure you have an asthma action plan.  · Keep all follow-up visits as told by your health care provider. This is important.  Contact a health care provider if:  · You have muscle aches.  · You have chest pain.  · The mucus that you cough up (sputum) changes from clear or white to yellow, green, gray, or bloody.  · You have a fever.  · Your sputum gets thicker.  Get help right away if:  · Your wheezing and coughing get worse, even after you take your prescribed medicines.  · It gets even harder to breathe.  · You develop severe chest pain.  Summary  · Bronchospasm is a tightening of the airways going into the lungs.  · During an episode of  bronchospasm, you may have a harder time breathing. You may cough and make a whistling sound when you breathe (wheeze).  · Avoid exposure to triggers such as smoke, dust, mold, animal dander, and fragrances.  · When you have an episode of bronchospasm, stay calm. Try to relax and breathe more slowly.  This information is not intended to replace advice given to you by your health care provider. Make sure you discuss any questions you have with your health care provider.  Document Released: 12/20/2004 Document Revised: 12/14/2017 Document Reviewed: 12/14/2017  [employer] Patient Education © 2019 [employer]

## 2019-07-02 NOTE — PROGRESS NOTES
Subjective   Radha Torre is a 44 y.o. female.     Chief Complaint   Patient presents with   • Cough     tx for poss pneumonia took abx and steroids may 19      HPI patient is new to me.  She is here to establish care for cough.  Her cough is been going on since May.  She went to an urgent care in [place] while on vacation in early May and had a mildly abnormal chest x-ray which patient says the radiologist did not read as pneumonia but the provider did and so she was treated with steroids, antibiotics, cough medicine, inhaler.  Her symptoms never really improved and so she followed up here with the urgent care and was given another round of steroids and cough medicine, as well as Flonase and Allegra.  She still has a tight cough that has not been helped by any of the medications she has been prescribed.  She has been taking them as directed.  She is never been diagnosed with asthma although she does have a son with asthma.  She has been using her son's mini neb machine and does feel like it works better than the inhaler which has not been helpful at all.  She wakes up at night at least once several times per week due to difficulty breathing.  She denies any dyspnea or fevers.  Cough is dry and frequent both day and night.    Prior to this she had an episode that was similar and the fall but it did not seem to last as long as this one.  It eventually just seemed to improve.     Uterine ablation secondary to prior having terrible, heavy periods.  Denies possibility of pregnancy today.    Social History     Tobacco Use   • Smoking status: Former Smoker     Packs/day: 0.50     Years: 3.00     Pack years: 1.50     Last attempt to quit: 2001     Years since quittin.1   • Smokeless tobacco: Never Used   Substance Use Topics   • Alcohol use: No   • Drug use: No       The following portions of the patient's history were reviewed and updated as appropriate: allergies, current medications, past family  history, past medical history, past social history, past surgical history and problem list.    Review of Systems   Constitutional: Positive for fatigue. Negative for activity change, appetite change, chills and fever.   HENT: Negative for congestion, ear discharge, ear pain, postnasal drip, rhinorrhea, sinus pressure, sinus pain, sneezing, sore throat and trouble swallowing.    Eyes: Negative for discharge and itching.   Respiratory: Positive for cough, chest tightness, shortness of breath and wheezing.    Cardiovascular: Positive for palpitations (Has few episodes of palpitations when sitting at home usually watching TV or resting and lasts just a few minutes.). Negative for chest pain.   Gastrointestinal: Negative for abdominal pain, diarrhea, nausea and vomiting.   Musculoskeletal: Negative for arthralgias and myalgias.   Skin: Negative for rash.   Allergic/Immunologic: Negative for environmental allergies.   Neurological: Negative for dizziness, weakness and headaches.       Objective   Blood pressure 130/82, pulse 112, temperature 98.8 °F (37.1 °C), temperature source Oral, resp. rate 18, weight 119 kg (262 lb 8 oz), SpO2 97 %, not currently breastfeeding.    Physical Exam   Constitutional: She is oriented to person, place, and time. She appears well-developed and well-nourished. No distress.   HENT:   Head: Normocephalic and atraumatic.   Right Ear: Tympanic membrane, external ear and ear canal normal.   Left Ear: Tympanic membrane, external ear and ear canal normal.   Mouth/Throat: Uvula is midline and oropharynx is clear and moist.   Eyes: Conjunctivae and EOM are normal. Pupils are equal, round, and reactive to light. Right eye exhibits no discharge. Left eye exhibits no discharge.   Neck: Neck supple. No thyromegaly present.   Cardiovascular: Normal rate, regular rhythm, normal heart sounds and intact distal pulses.   No murmur heard.  Pulmonary/Chest: Effort normal. She has no wheezes. She has no rales.    Bilateral breath sounds very tight at bases prior to mini neb.  When she was breathing for exam she had frequent dry tight sounding cough.  Post mini neb she was able to breathe more deeply for exam and she noticed a significant improvement in her breathing.   Abdominal: Soft. Bowel sounds are normal. There is no hepatosplenomegaly. There is no tenderness.   Musculoskeletal: She exhibits no deformity.   Gait smooth and steady   Lymphadenopathy:     She has no cervical adenopathy.   Neurological: She is alert and oriented to person, place, and time. No cranial nerve deficit.   Skin: Skin is warm and dry.   Psychiatric: She has a normal mood and affect.   Nursing note and vitals reviewed.      Assessment   Problem List Items Addressed This Visit     None      Visit Diagnoses     Cough    -  Primary    Relevant Medications    albuterol (PROVENTIL) (2.5 MG/3ML) 0.083% nebulizer solution    albuterol sulfate  (90 Base) MCG/ACT inhaler    ipratropium-albuterol (DUO-NEB) nebulizer solution 3 mL (Completed)    Other Relevant Orders    Nebulizer Treatment    Ambulatory Referral to Pulmonology    XR Chest PA & Lateral (Completed)    Wheezing        Relevant Medications    ipratropium-albuterol (DUO-NEB) nebulizer solution 3 mL (Completed)    Other Relevant Orders    Ambulatory Referral to Pulmonology    XR Chest PA & Lateral (Completed)           Procedures           Impression and Plan:  Patient reports great improvement in breathing post duo-neb.  I will order ipratropium for her to add to her albuterol [place] prn.  I will refer to pulmonology for dx and suggestions for mgmt of sx. asthma versus reactive airway disease versus bronchospastic cough.  I will give her a refill on her albuterol inhaler and this time I will give her a spacer and I have instructed her on use.  Also given her a Breo sample of 100 mcg/25mcg and instructions to use.  We will see if this helps her symptoms.    I will get CXR today as she reports  previous abnl one at  this spring.      I will order labs and have her get these few days prior to physical which she will schedule in the next month or so    I have reviewed her last PCP note in which she reported palpitations and had a normal EKG.  I have also reviewed her urgent care notes.  There is no chest x-ray to view.    We discussed s/s requiring emergent tx.     Health Maintenance Due   Topic Date Due   • ANNUAL PHYSICAL  05/12/2019   • ANNUAL GYN EXAM  05/15/2019              EMR Dragon/Transcription disclaimer:   Much of this encounter note is an electronic transcription/translation of spoken language to printed text. The electronic translation of spoken language may permit erroneous, or at times, nonsensical words or phrases to be inadvertently transcribed; Although I have reviewed the note for such errors, some may still exist.        Bronchospasm, Adult  Bronchospasm is a tightening of the airways going into the lungs. During an episode, it may be harder to breathe. You may cough, and you may make a whistling sound when you breathe (wheeze).  This condition often affects people with asthma.  What are the causes?  This condition is caused by swelling and irritation in the airways. It can be triggered by:  · An infection (common).  · Seasonal allergies.  · An allergic reaction.  · Exercise.  · Irritants. These include pollution, cigarette smoke, strong odors, aerosol sprays, and paint fumes.  · Weather changes. Winds increase molds and pollens in the air. Cold air may cause swelling.  · Stress and emotional upset.    What are the signs or symptoms?  Symptoms of this condition include:  · Wheezing. If the episode was triggered by an allergy, wheezing may start right away or hours later.  · Nighttime coughing.  · Frequent or severe coughing with a simple cold.  · Chest tightness.  · Shortness of breath.  · Decreased ability to exercise.    How is this diagnosed?  This condition is usually diagnosed  with a review of your medical history and a physical exam. Tests, such as lung function tests, are sometimes done to look for other conditions. The need for a chest X-ray depends on where the wheezing occurs and whether it is the first time you have wheezed.  How is this treated?  This condition may be treated with:  · Inhaled medicines. These open up the airways and help you breathe. They can be taken with an inhaler or a nebulizer device.  · Corticosteroid medicines. These may be given for severe bronchospasm, usually when it is associated with asthma.  · Avoiding triggers, such as irritants, infection, or allergies.    Follow these instructions at home:  Medicines  · Take over-the-counter and prescription medicines only as told by your health care provider.  · If you need to use an inhaler or nebulizer to take your medicine, ask your health care provider to explain how to use it correctly. If you were given a spacer, always use it with your inhaler.  Lifestyle  · Reduce the number of triggers in your home. To do this:  ? Change your heating and air conditioning filter at least once a month.  ? Limit your use of fireplaces and wood stoves.  ? Do not smoke. Do not allow smoking in your home.  ? Avoid using perfumes and fragrances.  ? Get rid of pests, such as roaches and mice, and their droppings.  ? Remove any mold from your home.  ? Keep your house clean and dust free. Use unscented cleaning products.  ? Replace carpet with wood, tile, or vinyl bettie. Carpet can trap dander and dust.  ? Use allergy-proof pillows, mattress covers, and box spring covers.  ? Wash bed sheets and blankets every week in hot water. Dry them in a dryer.  ? Use blankets that are made of polyester or cotton.  ? Wash your hands often.  ? Do not allow pets in your bedroom.  · Avoid breathing in cold air when you exercise.  General instructions  · Have a plan for seeking medical care. Know when to call your health care provider and local  emergency services, and where to get emergency care.  · Stay up to date on your immunizations.  · When you have an episode of bronchospasm, stay calm. Try to relax and breathe more slowly.  · If you have asthma, make sure you have an asthma action plan.  · Keep all follow-up visits as told by your health care provider. This is important.  Contact a health care provider if:  · You have muscle aches.  · You have chest pain.  · The mucus that you cough up (sputum) changes from clear or white to yellow, green, gray, or bloody.  · You have a fever.  · Your sputum gets thicker.  Get help right away if:  · Your wheezing and coughing get worse, even after you take your prescribed medicines.  · It gets even harder to breathe.  · You develop severe chest pain.  Summary  · Bronchospasm is a tightening of the airways going into the lungs.  · During an episode of bronchospasm, you may have a harder time breathing. You may cough and make a whistling sound when you breathe (wheeze).  · Avoid exposure to triggers such as smoke, dust, mold, animal dander, and fragrances.  · When you have an episode of bronchospasm, stay calm. Try to relax and breathe more slowly.  This information is not intended to replace advice given to you by your health care provider. Make sure you discuss any questions you have with your health care provider.  Document Released: 12/20/2004 Document Revised: 12/14/2017 Document Reviewed: 12/14/2017  [employer] Patient Education © 2019 [employer]

## 2019-08-01 DIAGNOSIS — Z00.00 ROUTINE GENERAL MEDICAL EXAMINATION AT A HEALTH CARE FACILITY: ICD-10-CM

## 2019-08-01 DIAGNOSIS — R05.9 COUGH: ICD-10-CM

## 2019-08-02 ENCOUNTER — OFFICE VISIT (OUTPATIENT)
Dept: FAMILY MEDICINE CLINIC | Facility: CLINIC | Age: 44
End: 2019-08-02

## 2019-08-02 VITALS
SYSTOLIC BLOOD PRESSURE: 130 MMHG | HEIGHT: 67 IN | WEIGHT: 259 LBS | DIASTOLIC BLOOD PRESSURE: 74 MMHG | BODY MASS INDEX: 40.65 KG/M2 | RESPIRATION RATE: 18 BRPM | OXYGEN SATURATION: 99 % | TEMPERATURE: 97.6 F | HEART RATE: 113 BPM

## 2019-08-02 DIAGNOSIS — F33.41 RECURRENT MAJOR DEPRESSIVE DISORDER, IN PARTIAL REMISSION (HCC): ICD-10-CM

## 2019-08-02 DIAGNOSIS — Z00.00 ROUTINE GENERAL MEDICAL EXAMINATION AT A HEALTH CARE FACILITY: Primary | ICD-10-CM

## 2019-08-02 DIAGNOSIS — F41.9 ANXIETY: ICD-10-CM

## 2019-08-02 DIAGNOSIS — R05.9 COUGH: ICD-10-CM

## 2019-08-02 LAB
ALBUMIN SERPL-MCNC: 4.3 G/DL (ref 3.5–5.2)
ALBUMIN/GLOB SERPL: 1.4 G/DL
ALP SERPL-CCNC: 83 U/L (ref 39–117)
ALT SERPL-CCNC: 15 U/L (ref 1–33)
AST SERPL-CCNC: 15 U/L (ref 1–32)
BASOPHILS # BLD AUTO: 0.05 10*3/MM3 (ref 0–0.2)
BASOPHILS NFR BLD AUTO: 0.6 % (ref 0–1.5)
BILIRUB SERPL-MCNC: 0.3 MG/DL (ref 0.2–1.2)
BUN SERPL-MCNC: 8 MG/DL (ref 6–20)
BUN/CREAT SERPL: 12.9 (ref 7–25)
CALCIUM SERPL-MCNC: 9.2 MG/DL (ref 8.6–10.5)
CHLORIDE SERPL-SCNC: 102 MMOL/L (ref 98–107)
CHOLEST SERPL-MCNC: 201 MG/DL (ref 0–200)
CO2 SERPL-SCNC: 26.4 MMOL/L (ref 22–29)
CREAT SERPL-MCNC: 0.62 MG/DL (ref 0.57–1)
EOSINOPHIL # BLD AUTO: 0.38 10*3/MM3 (ref 0–0.4)
EOSINOPHIL NFR BLD AUTO: 4.6 % (ref 0.3–6.2)
ERYTHROCYTE [DISTWIDTH] IN BLOOD BY AUTOMATED COUNT: 13.5 % (ref 12.3–15.4)
GLOBULIN SER CALC-MCNC: 3.1 GM/DL
GLUCOSE SERPL-MCNC: 82 MG/DL (ref 65–99)
HCT VFR BLD AUTO: 44.7 % (ref 34–46.6)
HDLC SERPL-MCNC: 45 MG/DL (ref 40–60)
HGB BLD-MCNC: 13.9 G/DL (ref 12–15.9)
IMM GRANULOCYTES # BLD AUTO: 0.03 10*3/MM3 (ref 0–0.05)
IMM GRANULOCYTES NFR BLD AUTO: 0.4 % (ref 0–0.5)
LDLC SERPL CALC-MCNC: 122 MG/DL (ref 0–100)
LYMPHOCYTES # BLD AUTO: 1.69 10*3/MM3 (ref 0.7–3.1)
LYMPHOCYTES NFR BLD AUTO: 20.5 % (ref 19.6–45.3)
MCH RBC QN AUTO: 27.6 PG (ref 26.6–33)
MCHC RBC AUTO-ENTMCNC: 31.1 G/DL (ref 31.5–35.7)
MCV RBC AUTO: 88.7 FL (ref 79–97)
MONOCYTES # BLD AUTO: 0.68 10*3/MM3 (ref 0.1–0.9)
MONOCYTES NFR BLD AUTO: 8.3 % (ref 5–12)
NEUTROPHILS # BLD AUTO: 5.4 10*3/MM3 (ref 1.7–7)
NEUTROPHILS NFR BLD AUTO: 65.6 % (ref 42.7–76)
NRBC BLD AUTO-RTO: 0 /100 WBC (ref 0–0.2)
PLATELET # BLD AUTO: 365 10*3/MM3 (ref 140–450)
POTASSIUM SERPL-SCNC: 4.2 MMOL/L (ref 3.5–5.2)
PROT SERPL-MCNC: 7.4 G/DL (ref 6–8.5)
RBC # BLD AUTO: 5.04 10*6/MM3 (ref 3.77–5.28)
SODIUM SERPL-SCNC: 141 MMOL/L (ref 136–145)
TRIGL SERPL-MCNC: 169 MG/DL (ref 0–150)
TSH SERPL DL<=0.005 MIU/L-ACNC: 1.15 MIU/ML (ref 0.27–4.2)
VLDLC SERPL CALC-MCNC: 33.8 MG/DL
WBC # BLD AUTO: 8.23 10*3/MM3 (ref 3.4–10.8)

## 2019-08-02 PROCEDURE — 99396 PREV VISIT EST AGE 40-64: CPT | Performed by: NURSE PRACTITIONER

## 2019-08-02 RX ORDER — CETIRIZINE HYDROCHLORIDE 10 MG/1
10 TABLET ORAL DAILY
COMMUNITY

## 2019-08-02 RX ORDER — FLUTICASONE PROPIONATE 50 MCG
2 SPRAY, SUSPENSION (ML) NASAL DAILY
Qty: 1 BOTTLE | Refills: 3 | OUTPATIENT
Start: 2019-08-02 | End: 2021-12-09

## 2019-08-02 RX ORDER — ALBUTEROL SULFATE 2.5 MG/3ML
2.5 SOLUTION RESPIRATORY (INHALATION) EVERY 6 HOURS PRN
Qty: 30 VIAL | Refills: 0 | Status: SHIPPED | OUTPATIENT
Start: 2019-08-02 | End: 2020-03-30

## 2019-08-02 RX ORDER — ESCITALOPRAM OXALATE 10 MG/1
10 TABLET ORAL DAILY
Qty: 30 TABLET | Refills: 1 | Status: SHIPPED | OUTPATIENT
Start: 2019-08-02 | End: 2019-10-01 | Stop reason: SDUPTHER

## 2019-08-02 NOTE — PROGRESS NOTES
Subjective   Radha Torre is a 44 y.o. female.     Chief Complaint   Patient presents with   • Annual Exam   • Asthma   • Sinusitis      HPI patient is here for physical and to establish care.  She was previously seen by me for cough.  She has been doing much better since adding the ipratropium to her nebulizer and she does this twice per day routinely.  She does feel like she might be getting some sinusitis and has had a little bit of cough recently.  She is an appointment with pulmonology for an evaluation.  She did feel little improved with the Breo but has run out of that.  She has been using Flonase intermittently as well as taking an allergy pill recently.  She does have past medical history of a deviated nasal septum and wonders if this could be causing some of her problem.  She does wake up from cough but not from dyspnea.  With discussion she admits that she does feel somewhat anxious and depressed and this is been increasing recently.  She is not sure if it is due to being sick or if she is sick due to depression and anxiety.  She sometimes feels overwhelmed with caring for children 1 of whom is on the spectrum.  She does report episodes of seasonal affective disorder and she feels just like this only she has been out in the sunshine and warm weather recently.  She also had episodes of postpartum depression which was improved with Zoloft which she took for 3 months previously.  She always feels fatigued and is an effort to get her ADLs done.  Does have family history of anxiety and depression and believes one family members on Zoloft but does not know what her mother takes or if she takes medication.  She denies any family history of suicides or other mental illness.    Denies family history of colon, breast, ovarian cancers.    Social History     Tobacco Use   • Smoking status: Former Smoker     Packs/day: 0.50     Years: 3.00     Pack years: 1.50     Last attempt to quit: 5/12/2001     Years since  "quittin.2   • Smokeless tobacco: Never Used   Substance Use Topics   • Alcohol use: No   • Drug use: No       The following portions of the patient's history were reviewed and updated as appropriate: allergies, current medications, past family history, past medical history, past social history, past surgical history and problem list.    Review of Systems   Constitutional: Positive for fatigue. Negative for chills and fever.   HENT: Positive for congestion, postnasal drip, rhinorrhea and sinus pressure. Negative for ear discharge, ear pain, sore throat and trouble swallowing.    Eyes: Negative for discharge and itching.   Respiratory: Positive for cough. Negative for shortness of breath and wheezing.    Cardiovascular: Negative for chest pain and palpitations.   Gastrointestinal: Negative for abdominal pain, blood in stool, constipation, diarrhea, nausea and vomiting.   Endocrine: Negative for polydipsia and polyuria.   Genitourinary: Negative for dysuria and hematuria.   Musculoskeletal: Negative for back pain and gait problem.   Skin: Negative for rash.   Allergic/Immunologic: Positive for environmental allergies.   Neurological: Negative for seizures, weakness and headaches.   Psychiatric/Behavioral: Positive for dysphoric mood. Negative for self-injury, sleep disturbance and suicidal ideas. The patient is nervous/anxious.        Objective   Blood pressure 130/74, pulse 113, temperature 97.6 °F (36.4 °C), resp. rate 18, height 170.2 cm (67\"), weight 117 kg (259 lb), SpO2 99 %, not currently breastfeeding.    Physical Exam   Constitutional: She is oriented to person, place, and time. She appears well-developed and well-nourished. No distress.   HENT:   Head: Normocephalic and atraumatic.   Right Ear: Tympanic membrane, external ear and ear canal normal.   Left Ear: External ear and ear canal normal. Tympanic membrane is not erythematous and not bulging. A middle ear effusion is present.   Mouth/Throat: Uvula " is midline. Posterior oropharyngeal erythema present.   Eyes: Conjunctivae and EOM are normal. Pupils are equal, round, and reactive to light. Right eye exhibits no discharge. Left eye exhibits no discharge.   Neck: Neck supple. No thyromegaly present.   Cardiovascular: Normal rate, regular rhythm, normal heart sounds and intact distal pulses.   No murmur heard.  Pulmonary/Chest: Effort normal and breath sounds normal.   Abdominal: Soft. Bowel sounds are normal. There is no hepatosplenomegaly. There is no tenderness.   Musculoskeletal: She exhibits no deformity.   Gait smooth and steady   Lymphadenopathy:     She has no cervical adenopathy.   Neurological: She is alert and oriented to person, place, and time. No cranial nerve deficit.   Skin: Skin is warm and dry.   Psychiatric: She has a normal mood and affect. Her behavior is normal. Thought content normal.   Became tearful at times with discussion of her anxiety and depression.   Nursing note and vitals reviewed.      Assessment   Problem List Items Addressed This Visit     None      Visit Diagnoses     Routine general medical examination at a health care facility    -  Primary    Cough        Relevant Medications    fluticasone (FLONASE) 50 MCG/ACT nasal spray    albuterol (PROVENTIL) (2.5 MG/3ML) 0.083% nebulizer solution    Wheeze        Relevant Medications    fluticasone (FLONASE) 50 MCG/ACT nasal spray    ipratropium (ATROVENT) 0.02 % nebulizer solution    Anxiety        Relevant Medications    escitalopram (LEXAPRO) 10 MG tablet    Recurrent major depressive disorder, in partial remission (CMS/HCC)        Relevant Medications    escitalopram (LEXAPRO) 10 MG tablet           Procedures PHQ-9 Total Score: 11   IZABELA 7 Total Score: 12         Impression and Plan: We discussed her recent labs which were grossly normal.  We discussed her PHQ and IZABELA scores which were high and she is agreeable to use start Lexapro.  Discussed MOA, onset and expectations of  medication as well as side effects.  flonase refilled-increase to BID x 1 week for congestion and PND and see if this helps.  Sudafed prn for congestion and sinus pressure.  I hate to put her on an antibiotic as she has been on 3 antibiotics in the last couple months.  But if she worsens she will let me know and we may need to do another antibiotic.  I have asked her to let me know after she sees pulmonologist.  I have given her another Breo sample since that seemed to help as well as refilled her medications.  Her lungs seem clear today.  We will have her follow back up in 6 weeks, sooner if there are any problems.    We discussed healthy diet and ways to incorporate activity and exercise into daily life.  Recommendations include trying to get at least 150 mins. of moderate intensity aerobic activity that provide enjoyment to lower risks of CVD disease and also to decrease stressors and improve mood.    She is up-to-date on preventative maintenance and followed by GYN.    There are no preventive care reminders to display for this patient.           EMR Dragon/Transcription disclaimer:   Much of this encounter note is an electronic transcription/translation of spoken language to printed text. The electronic translation of spoken language may permit erroneous, or at times, nonsensical words or phrases to be inadvertently transcribed; Although I have reviewed the note for such errors, some may still exist.      Living With Anxiety    After being diagnosed with an anxiety disorder, you may be relieved to know why you have felt or behaved a certain way. It is natural to also feel overwhelmed about the treatment ahead and what it will mean for your life. With care and support, you can manage this condition and recover from it.  How to cope with anxiety  Dealing with stress  Stress is your body’s reaction to life changes and events, both good and bad. Stress can last just a few hours or it can be ongoing. Stress can play  a major role in anxiety, so it is important to learn both how to cope with stress and how to think about it differently.  Talk with your health care provider or a counselor to learn more about stress reduction. He or she may suggest some stress reduction techniques, such as:  · Music therapy. This can include creating or listening to music that you enjoy and that inspires you.  · Mindfulness-based meditation. This involves being aware of your normal breaths, rather than trying to control your breathing. It can be done while sitting or walking.  · Centering prayer. This is a kind of meditation that involves focusing on a word, phrase, or sacred image that is meaningful to you and that brings you peace.  · Deep breathing. To do this, expand your stomach and inhale slowly through your nose. Hold your breath for 3-5 seconds. Then exhale slowly, allowing your stomach muscles to relax.  · Self-talk. This is a skill where you identify thought patterns that lead to anxiety reactions and correct those thoughts.  · Muscle relaxation. This involves tensing muscles then relaxing them.  Choose a stress reduction technique that fits your lifestyle and personality. Stress reduction techniques take time and practice. Set aside 5-15 minutes a day to do them. Therapists can offer training in these techniques. The training may be covered by some insurance plans. Other things you can do to manage stress include:  · Keeping a stress diary. This can help you learn what triggers your stress and ways to control your response.  · Thinking about how you respond to certain situations. You may not be able to control everything, but you can control your reaction.  · Making time for activities that help you relax, and not feeling guilty about spending your time in this way.  Therapy combined with coping and stress-reduction skills provides the best chance for successful treatment.  Medicines  Medicines can help ease symptoms. Medicines for  anxiety include:  · Anti-anxiety drugs.  · Antidepressants.  · Beta-blockers.  Medicines may be used as the main treatment for anxiety disorder, along with therapy, or if other treatments are not working. Medicines should be prescribed by a health care provider.  Relationships  Relationships can play a big part in helping you recover. Try to spend more time connecting with trusted friends and family members. Consider going to couples counseling, taking family education classes, or going to family therapy. Therapy can help you and others better understand the condition.  How to recognize changes in your condition  Everyone has a different response to treatment for anxiety. Recovery from anxiety happens when symptoms decrease and stop interfering with your daily activities at home or work. This may mean that you will start to:  · Have better concentration and focus.  · Sleep better.  · Be less irritable.  · Have more energy.  · Have improved memory.  It is important to recognize when your condition is getting worse. Contact your health care provider if your symptoms interfere with home or work and you do not feel like your condition is improving.  Where to find help and support:  You can get help and support from these sources:  · Self-help groups.  · Online and community organizations.  · A trusted spiritual leader.  · Couples counseling.  · Family education classes.  · Family therapy.  Follow these instructions at home:  · Eat a healthy diet that includes plenty of vegetables, fruits, whole grains, low-fat dairy products, and lean protein. Do not eat a lot of foods that are high in solid fats, added sugars, or salt.  · Exercise. Most adults should do the following:  ? Exercise for at least 150 minutes each week. The exercise should increase your heart rate and make you sweat (moderate-intensity exercise).  ? Strengthening exercises at least twice a week.  · Cut down on caffeine, tobacco, alcohol, and other  potentially harmful substances.  · Get the right amount and quality of sleep. Most adults need 7-9 hours of sleep each night.  · Make choices that simplify your life.  · Take over-the-counter and prescription medicines only as told by your health care provider.  · Avoid caffeine, alcohol, and certain over-the-counter cold medicines. These may make you feel worse. Ask your pharmacist which medicines to avoid.  · Keep all follow-up visits as told by your health care provider. This is important.  Questions to ask your health care provider  · Would I benefit from therapy?  · How often should I follow up with a health care provider?  · How long do I need to take medicine?  · Are there any long-term side effects of my medicine?  · Are there any alternatives to taking medicine?  Contact a health care provider if:  · You have a hard time staying focused or finishing daily tasks.  · You spend many hours a day feeling worried about everyday life.  · You become exhausted by worry.  · You start to have headaches, feel tense, or have nausea.  · You urinate more than normal.  · You have diarrhea.  Get help right away if:  · You have a racing heart and shortness of breath.  · You have thoughts of hurting yourself or others.  If you ever feel like you may hurt yourself or others, or have thoughts about taking your own life, get help right away. You can go to your nearest emergency department or call:  · Your local emergency services (911 in the [place]  · A suicide crisis helpline, such as the National Suicide Prevention Lifeline at 1-898.478.6439. This is open 24-hours a day.  Summary  · Taking steps to deal with stress can help calm you.  · Medicines cannot cure anxiety disorders, but they can help ease symptoms.  · Family, friends, and partners can play a big part in helping you recover from an anxiety disorder.  This information is not intended to replace advice given to you by your health care provider. Make sure you discuss  any questions you have with your health care provider.  Document Released: 12/12/2017 Document Revised: 12/12/2017 Document Reviewed: 12/12/2017  [employer] Interactive Patient Education © 2019 [employer]

## 2019-10-01 DIAGNOSIS — F41.9 ANXIETY: ICD-10-CM

## 2019-10-01 DIAGNOSIS — F33.41 RECURRENT MAJOR DEPRESSIVE DISORDER, IN PARTIAL REMISSION (HCC): ICD-10-CM

## 2019-10-01 RX ORDER — ESCITALOPRAM OXALATE 10 MG/1
10 TABLET ORAL DAILY
Qty: 30 TABLET | Refills: 1 | Status: SHIPPED | OUTPATIENT
Start: 2019-10-01 | End: 2019-10-21 | Stop reason: DRUGHIGH

## 2019-10-21 ENCOUNTER — OFFICE VISIT (OUTPATIENT)
Dept: FAMILY MEDICINE CLINIC | Facility: CLINIC | Age: 44
End: 2019-10-21

## 2019-10-21 VITALS
WEIGHT: 251 LBS | OXYGEN SATURATION: 98 % | RESPIRATION RATE: 18 BRPM | HEIGHT: 67 IN | TEMPERATURE: 98 F | BODY MASS INDEX: 39.39 KG/M2 | HEART RATE: 88 BPM | DIASTOLIC BLOOD PRESSURE: 72 MMHG | SYSTOLIC BLOOD PRESSURE: 126 MMHG

## 2019-10-21 DIAGNOSIS — Z23 NEED FOR IMMUNIZATION AGAINST INFLUENZA: Primary | ICD-10-CM

## 2019-10-21 DIAGNOSIS — J45.40 MODERATE PERSISTENT ALLERGIC ASTHMA: ICD-10-CM

## 2019-10-21 DIAGNOSIS — F41.9 ANXIETY: ICD-10-CM

## 2019-10-21 DIAGNOSIS — R05.9 COUGH: ICD-10-CM

## 2019-10-21 DIAGNOSIS — F33.41 RECURRENT MAJOR DEPRESSIVE DISORDER, IN PARTIAL REMISSION (HCC): ICD-10-CM

## 2019-10-21 PROCEDURE — 90471 IMMUNIZATION ADMIN: CPT | Performed by: NURSE PRACTITIONER

## 2019-10-21 PROCEDURE — 90653 IIV ADJUVANT VACCINE IM: CPT | Performed by: NURSE PRACTITIONER

## 2019-10-21 PROCEDURE — 99213 OFFICE O/P EST LOW 20 MIN: CPT | Performed by: NURSE PRACTITIONER

## 2019-10-21 RX ORDER — OMEPRAZOLE 20 MG/1
20 CAPSULE, DELAYED RELEASE ORAL DAILY
Qty: 90 CAPSULE | Refills: 0 | OUTPATIENT
Start: 2019-10-21 | End: 2021-12-09

## 2019-10-21 RX ORDER — ESCITALOPRAM OXALATE 20 MG/1
20 TABLET ORAL DAILY
Qty: 90 TABLET | Refills: 1 | Status: SHIPPED | OUTPATIENT
Start: 2019-10-21 | End: 2020-07-30 | Stop reason: SDUPTHER

## 2019-10-21 NOTE — PROGRESS NOTES
"Subjective   Radha Torre is a 44 y.o. female.     Chief Complaint   Patient presents with   • Cough     pulm follow up      HPI  Here for f/u from pulmonology for chronic dry cough.  She had PFTs which indicated moderate persistent asthma and was started on BREO.  So far she thinks this has only been marginally helpful. She has been using her albuterol inhaler couple times per week.     Anxiety is somewhat controlled.  Still has a great deal of situational anxiety R/T caring for autistic son.  Taking and erika lexapro and has found it to be very helpful. Denies side effects other than she noticed some \"brain shocks\" when first starting on it for a few days, but they are now resolved.     Social History     Tobacco Use   • Smoking status: Former Smoker     Packs/day: 0.50     Years: 3.00     Pack years: 1.50     Last attempt to quit: 2001     Years since quittin.4   • Smokeless tobacco: Never Used   Substance Use Topics   • Alcohol use: No   • Drug use: No       The following portions of the patient's history were reviewed and updated as appropriate: allergies, current medications, past family history, past medical history, past social history, past surgical history and problem list.    Review of Systems   Constitutional: Negative for chills and fever.   HENT: Positive for congestion (intermittent) and rhinorrhea (intermittent). Negative for ear pain, postnasal drip, sore throat and trouble swallowing.    Respiratory: Positive for cough, shortness of breath and wheezing. Negative for chest tightness.    Cardiovascular: Negative for chest pain and palpitations.   Gastrointestinal: Negative for abdominal pain, blood in stool, constipation, diarrhea, nausea and vomiting.        Denies reflux   Neurological: Negative for weakness and headaches.   Psychiatric/Behavioral: Positive for dysphoric mood. Negative for sleep disturbance. The patient is nervous/anxious.        Objective   Blood pressure 126/72, " "pulse 88, temperature 98 °F (36.7 °C), resp. rate 18, height 170.2 cm (67\"), weight 114 kg (251 lb), SpO2 98 %, not currently breastfeeding.  Body mass index is 39.31 kg/m².    Physical Exam   Constitutional: She is oriented to person, place, and time. She appears well-developed and well-nourished. No distress.   HENT:   Head: Normocephalic and atraumatic.   Right Ear: Tympanic membrane, external ear and ear canal normal.   Left Ear: Tympanic membrane, external ear and ear canal normal.   Mouth/Throat: Oropharynx is clear and moist. No posterior oropharyngeal erythema.   Eyes: Conjunctivae are normal. Right eye exhibits no discharge. Left eye exhibits no discharge.   Neck: Neck supple.   Cardiovascular: Normal rate, regular rhythm and normal heart sounds.   Pulmonary/Chest: Effort normal and breath sounds normal.   Abdominal: Soft. Bowel sounds are normal. There is no tenderness.   Musculoskeletal: She exhibits no deformity.   Gait smooth and steady   Lymphadenopathy:     She has no cervical adenopathy.   Neurological: She is alert and oriented to person, place, and time.   Skin: Skin is warm and dry. She is not diaphoretic.   Psychiatric: She has a normal mood and affect.   Nursing note and vitals reviewed.      Assessment   Problem List Items Addressed This Visit        Other    Anxiety    Relevant Medications    escitalopram (LEXAPRO) 20 MG tablet      Other Visit Diagnoses     Need for immunization against influenza    -  Primary    Relevant Orders    Fluarix/Fluzone/Afluria Quad>6 Months (Completed)    Moderate persistent allergic asthma        Relevant Medications    Fluticasone Furoate-Vilanterol (BREO ELLIPTA) 200-25 MCG/INH inhaler    Other Relevant Orders    Ambulatory Referral to Allergy    Recurrent major depressive disorder, in partial remission (CMS/[facility])        Relevant Medications    escitalopram (LEXAPRO) 20 MG tablet    Cough        Relevant Medications    omeprazole (PRILOSEC) 20 MG capsule    " Other Relevant Orders    Ambulatory Referral to Allergy           Procedures PHQ-9 Total Score: 7   IZABELA 7 Total Score: 4           Impression and Plan:  Allergy and asthma referral.  Will increase lexapro.  PHQ and IZABELA today are much  improved from last visit in August. She will let me know via [employer] if she feels that this is not adequate.    I will refer her to allergist for testing, since pulmonology indicated allergic component to her cough and she has not noticed much improvement in her sx since starting BREO. Pulmonologist note reviewed.     We can also do trial of PPI to see if there is a component of reflux causing her cough.       There are no preventive care reminders to display for this patient.           EMR Dragon/Transcription disclaimer:   Much of this encounter note is an electronic transcription/translation of spoken language to printed text. The electronic translation of spoken language may permit erroneous, or at times, nonsensical words or phrases to be inadvertently transcribed; Although I have reviewed the note for such errors, some may still exist.

## 2020-03-28 DIAGNOSIS — R05.9 COUGH: ICD-10-CM

## 2020-03-30 RX ORDER — ALBUTEROL SULFATE 2.5 MG/3ML
SOLUTION RESPIRATORY (INHALATION)
Qty: 75 ML | Refills: 0 | Status: SHIPPED | OUTPATIENT
Start: 2020-03-30 | End: 2022-07-01 | Stop reason: SDUPTHER

## 2020-05-13 DIAGNOSIS — F41.9 ANXIETY: ICD-10-CM

## 2020-05-13 DIAGNOSIS — F33.41 RECURRENT MAJOR DEPRESSIVE DISORDER, IN PARTIAL REMISSION (HCC): ICD-10-CM

## 2020-05-14 DIAGNOSIS — F33.41 RECURRENT MAJOR DEPRESSIVE DISORDER, IN PARTIAL REMISSION (HCC): ICD-10-CM

## 2020-05-14 DIAGNOSIS — F41.9 ANXIETY: ICD-10-CM

## 2020-05-14 RX ORDER — ESCITALOPRAM OXALATE 20 MG/1
20 TABLET ORAL DAILY
Qty: 90 TABLET | Refills: 1 | OUTPATIENT
Start: 2020-05-14

## 2020-05-14 RX ORDER — ESCITALOPRAM OXALATE 20 MG/1
TABLET ORAL
Qty: 30 TABLET | Refills: 0 | OUTPATIENT
Start: 2020-05-14

## 2020-05-29 ENCOUNTER — TRANSCRIBE ORDERS (OUTPATIENT)
Dept: ADMINISTRATIVE | Facility: HOSPITAL | Age: 45
End: 2020-05-29

## 2020-05-29 DIAGNOSIS — Z12.39 SCREENING BREAST EXAMINATION: Primary | ICD-10-CM

## 2020-06-11 ENCOUNTER — HOSPITAL ENCOUNTER (OUTPATIENT)
Dept: MAMMOGRAPHY | Facility: HOSPITAL | Age: 45
Discharge: HOME | End: 2020-06-11
Admitting: OBSTETRICS & GYNECOLOGY

## 2020-06-11 DIAGNOSIS — Z12.39 SCREENING BREAST EXAMINATION: ICD-10-CM

## 2020-06-11 PROCEDURE — 77063 BREAST TOMOSYNTHESIS BI: CPT

## 2020-06-11 PROCEDURE — 77067 SCR MAMMO BI INCL CAD: CPT

## 2020-07-30 ENCOUNTER — TELEMEDICINE (OUTPATIENT)
Dept: FAMILY MEDICINE CLINIC | Facility: CLINIC | Age: 45
End: 2020-07-30

## 2020-07-30 DIAGNOSIS — F41.9 ANXIETY: Primary | ICD-10-CM

## 2020-07-30 DIAGNOSIS — F33.41 RECURRENT MAJOR DEPRESSIVE DISORDER, IN PARTIAL REMISSION (HCC): ICD-10-CM

## 2020-07-30 PROCEDURE — 99442 PR PHYS/QHP TELEPHONE EVALUATION 11-20 MIN: CPT | Performed by: NURSE PRACTITIONER

## 2020-07-30 RX ORDER — HYDROXYZINE HYDROCHLORIDE 25 MG/1
25 TABLET, FILM COATED ORAL NIGHTLY PRN
Qty: 30 TABLET | Refills: 0 | Status: SHIPPED | OUTPATIENT
Start: 2020-07-30 | End: 2020-09-14

## 2020-07-30 RX ORDER — ESCITALOPRAM OXALATE 20 MG/1
20 TABLET ORAL DAILY
Qty: 90 TABLET | Refills: 0 | Status: SHIPPED | OUTPATIENT
Start: 2020-07-30 | End: 2020-11-15 | Stop reason: SDUPTHER

## 2020-07-30 NOTE — PATIENT INSTRUCTIONS
Panic Attack    A panic attack is when you suddenly feel very afraid, uncomfortable, or nervous (anxious). A panic attack can happen when you are scared or for no reason.  A panic attack can feel like a serious problem. It can even feel like a heart attack or stroke. See your doctor when you have a panic attack to make sure you do not have a serious problem.  Follow these instructions at home:  · Take medicines only as told by your doctor.  · If you feel worried or nervous, try not to have caffeine.  · Take good care of your health. To do this:  ? Eat healthy. Make sure to eat fresh fruits and vegetables, whole grains, lean meats, and low-fat dairy.  ? Get enough sleep. Try to sleep for 7-8 hours each night.  ? Exercise. Try to be active for 30 minutes 5 or more days a week.  ? Do not smoke. Talk to your doctor if you need help quitting.  ? Limit how much alcohol you drink:  § If you are a woman who is not pregnant: try not to have more than 1 drink a day.  § If you are a man: try not to have more than 2 drinks a day.  § One drink equals 12 oz of beer, 5 oz of wine, or 1½ oz of hard liquor.  · Keep all follow-up visits as told by your doctor. This is important.  Contact a doctor if:  · Your symptoms do not get better.  · Your symptoms get worse.  · You are not able to take your medicines as told.  Get help right away if:  · You have thoughts of hurting yourself or others.  · You have symptoms of a panic attack. Do not drive yourself to the hospital. Have someone else drive you or call an ambulance.  If you feel like you may hurt yourself or others, or have thoughts about taking your own life, get help right away. You can go to your nearest emergency department or call:  · Your local emergency services (911 in the [place]  · A suicide crisis helpline, such as the National Suicide Prevention Lifeline at 1-661.269.2312. This is open 24 hours a day.  Summary  · A panic attack is when you suddenly feel very afraid,  uncomfortable, or nervous (anxious).  · See your doctor when you have a panic attack to make sure that you do not have another serious problem.  · If you feel like you may hurt yourself or others, get help right away by calling 911.  This information is not intended to replace advice given to you by your health care provider. Make sure you discuss any questions you have with your health care provider.  Document Released: 01/20/2012 Document Revised: 11/30/2018 Document Reviewed: 01/31/2018  [employer]vier Patient Education © 2020 [employer]

## 2020-07-30 NOTE — PROGRESS NOTES
Subjective   Radha Torre is a 45 y.o. female.     Chief Complaint   Patient presents with   • Anxiety      HPI patient scheduled of telephone visit to follow-up on anxiety.  She was last seen for a physical and new patient last August.  At that time we started Lexapro for situational depression and anxiety.  Since then she had a dose increase to 20 mg.  She had been thinking about weaning off but she feels very stressed and does not think now would be a good time to wean off.  Her mom was recently diagnosed with breast cancer and this is been a terrible struggle for patient.  She also is having marital problems and has had to deal with her  more since COVID which has been very difficult.  And she has an autistic son.  He is doing very well despite COVID but things can get very stressful caring for him.    Evenings after her son goes to bed tend to be the worst time for her.  She sometimes has some panic attacks and was having trouble sleeping.  Her panic attacks manifest as crying and ruminating type thoughts and she has vomited a couple times.  She started taking Unisom nightly and this has helped her sleep but does not really help with the panic attacks.  She has used phone apps for deep breathing, tried yoga, tried various supplements from [employer] without much improvement in her symptoms.  She is also begun counseling with her sons counselor-getting support for parent of autistic child.  She has found that this is been helpful.         Social History     Tobacco Use   • Smoking status: Former Smoker     Packs/day: 0.50     Years: 3.00     Pack years: 1.50     Last attempt to quit: 2001     Years since quittin.2   • Smokeless tobacco: Never Used   Substance Use Topics   • Alcohol use: No   • Drug use: No       The following portions of the patient's history were reviewed and updated as appropriate: allergies, current medications, past family history, past medical history, past  social history, past surgical history and problem list.    Review of Systems   Constitutional: Negative for activity change, appetite change and unexpected weight change.   Neurological: Negative for weakness and headaches.   Psychiatric/Behavioral: Positive for decreased concentration, dysphoric mood and sleep disturbance. Negative for self-injury and suicidal ideas. The patient is nervous/anxious.        Objective   not currently breastfeeding.  There is no height or weight on file to calculate BMI.    Physical Exam   Limited by telephone visit.  She is well sounding albeit somewhat anxious sounding and at one point seemed to be holding back tears. She does not seem to be distressed.  She seems alert and oriented and her mood and affect are as expected given her situation and stressors, good historian of medical history.  She does seem to be able to put her situation in perspective and understands that much of it is situational and will be resolved sooner or later.  No cough or dyspnea appreciated, able to complete sentences without problem.       Assessment   Problem List Items Addressed This Visit        Other    Anxiety - Primary    Relevant Medications    hydrOXYzine (ATARAX) 25 MG tablet    escitalopram (Lexapro) 20 MG tablet      Other Visit Diagnoses     Recurrent major depressive disorder, in partial remission (CMS/HCC)        Relevant Medications    hydrOXYzine (ATARAX) 25 MG tablet    escitalopram (Lexapro) 20 MG tablet           Procedures           Impression and Plan: We discussed that now would most likely not be the best time to wean her Lexapro.  She was more concerned about long-term side effects and I think the benefits would outweigh any risks and she agrees with this.  We will continue the Lexapro and reevaluate when some of her situational stressors improve.  For her sleep and evening anxiety and panic attacks we will start hydroxyzine at 25 mg which should help both.  She should avoid any  alcohol.  We discussed the side effect of drowsiness.  She could take half of a tablet if needed during the day for other anxiety, but it still may make her sleepy which we discussed.  If she thinks she will be taking it during the day we could consider switching to a 10 mg tablet 3 times daily as needed and could increase to 2 tabs at at bedtime for sleep.  If this makes her too sleepy and she still finds that she needs something additionally during the day to help her manage anxiety we could add BuSpar.  I will have her follow-up in 30 days.  She is scheduling a physical in August, however if conditions are not safe with COVID we can do a follow-up telephone visit for her anxiety and panic attacks.  I do want to have a short follow-up with her so the only given her 30 days of the hydroxyzine to encourage her to make sure to follow-up so I can make sure her medications are optimized to help her make it through her current stressors.  If she is having problems or the hydroxyzine and Lexapro are not helpful I have encouraged her to set up another telephone visit sooner than a month.  She does not verbalize any SI or HI.    Health Maintenance Due   Topic Date Due   • HEPATITIS C SCREENING  11/02/2016              EMR Dragon/Transcription disclaimer:   Much of this encounter note is an electronic transcription/translation of spoken language to printed text. The electronic translation of spoken language may permit erroneous, or at times, nonsensical words or phrases to be inadvertently transcribed; Although I have reviewed the note for such errors, some may still exist.      Patient gave consent today for a telehealth video visit as following recommendations of [place] governor and CDC during the COVID-19 pandemic.    16 min was spent in discussion with pt and greater than 50% of that time was spent counseling.    [employer] platform used with good audio quality.

## 2020-08-03 ENCOUNTER — TELEMEDICINE (OUTPATIENT)
Dept: FAMILY MEDICINE CLINIC | Facility: CLINIC | Age: 45
End: 2020-08-03

## 2020-08-03 ENCOUNTER — TELEPHONE (OUTPATIENT)
Dept: FAMILY MEDICINE CLINIC | Facility: CLINIC | Age: 45
End: 2020-08-03

## 2020-08-03 DIAGNOSIS — J02.9 SORE THROAT: Primary | ICD-10-CM

## 2020-08-03 PROCEDURE — 99213 OFFICE O/P EST LOW 20 MIN: CPT | Performed by: NURSE PRACTITIONER

## 2020-08-03 NOTE — PATIENT INSTRUCTIONS
COVID-19  COVID-19 is a respiratory infection that is caused by a virus called severe acute respiratory syndrome coronavirus 2 (SARS-CoV-2). The disease is also known as coronavirus disease or novel coronavirus. In some people, the virus may not cause any symptoms. In others, it may cause a serious infection. The infection can get worse quickly and can lead to complications, such as:  · Pneumonia, or infection of the lungs.  · Acute respiratory distress syndrome or ARDS. This is fluid build-up in the lungs.  · Acute respiratory failure. This is a condition in which there is not enough oxygen passing from the lungs to the body.  · Sepsis or septic shock. This is a serious bodily reaction to an infection.  · Blood clotting problems.  · Secondary infections due to bacteria or fungus.  The virus that causes COVID-19 is contagious. This means that it can spread from person to person through droplets from coughs and sneezes (respiratory secretions).  What are the causes?  This illness is caused by a virus. You may catch the virus by:  · Breathing in droplets from an infected person's cough or sneeze.  · Touching something, like a table or a doorknob, that was exposed to the virus (contaminated) and then touching your mouth, nose, or eyes.  What increases the risk?  Risk for infection  You are more likely to be infected with this virus if you:  · Live in or travel to an area with a COVID-19 outbreak.  · Come in contact with a sick person who recently traveled to an area with a COVID-19 outbreak.  · Provide care for or live with a person who is infected with COVID-19.  Risk for serious illness  You are more likely to become seriously ill from the virus if you:  · Are 65 years of age or older.  · Have a long-term disease that lowers your body's ability to fight infection (immunocompromised).  · Live in a nursing home or long-term care facility.  · Have a long-term (chronic) disease such as:  ? Chronic lung disease, including  chronic obstructive pulmonary disease or asthma  ? Heart disease.  ? Diabetes.  ? Chronic kidney disease.  ? Liver disease.  · Are obese.  What are the signs or symptoms?  Symptoms of this condition can range from mild to severe. Symptoms may appear any time from 2 to 14 days after being exposed to the virus. They include:  · A fever.  · A cough.  · Difficulty breathing.  · Chills.  · Muscle pains.  · A sore throat.  · Loss of taste or smell.  Some people may also have stomach problems, such as nausea, vomiting, or diarrhea.  Other people may not have any symptoms of COVID-19.  How is this diagnosed?  This condition may be diagnosed based on:  · Your signs and symptoms, especially if:  ? You live in an area with a COVID-19 outbreak.  ? You recently traveled to or from an area where the virus is common.  ? You provide care for or live with a person who was diagnosed with COVID-19.  · A physical exam.  · Lab tests, which may include:  ? A nasal swab to take a sample of fluid from your nose.  ? A throat swab to take a sample of fluid from your throat.  ? A sample of mucus from your lungs (sputum).  ? Blood tests.  · Imaging tests, which may include, X-rays, CT scan, or ultrasound.  How is this treated?  At present, there is no medicine to treat COVID-19. Medicines that treat other diseases are being used on a trial basis to see if they are effective against COVID-19.  Your health care provider will talk with you about ways to treat your symptoms. For most people, the infection is mild and can be managed at home with rest, fluids, and over-the-counter medicines.  Treatment for a serious infection usually takes places in a hospital intensive care unit (ICU). It may include one or more of the following treatments. These treatments are given until your symptoms improve.  · Receiving fluids and medicines through an IV.  · Supplemental oxygen. Extra oxygen is given through a tube in the nose, a face mask, or a  felix.  · Positioning you to lie on your stomach (prone position). This makes it easier for oxygen to get into the lungs.  · Continuous positive airway pressure (CPAP) or bi-level positive airway pressure (BPAP) machine. This treatment uses mild air pressure to keep the airways open. A tube that is connected to a motor delivers oxygen to the body.  · Ventilator. This treatment moves air into and out of the lungs by using a tube that is placed in your windpipe.  · Tracheostomy. This is a procedure to create a hole in the neck so that a breathing tube can be inserted.  · Extracorporeal membrane oxygenation (ECMO). This procedure gives the lungs a chance to recover by taking over the functions of the heart and lungs. It supplies oxygen to the body and removes carbon dioxide.  Follow these instructions at home:  Lifestyle  · If you are sick, stay home except to get medical care. Your health care provider will tell you how long to stay home. Call your health care provider before you go for medical care.  · Rest at home as told by your health care provider.  · Do not use any products that contain nicotine or tobacco, such as cigarettes, e-cigarettes, and chewing tobacco. If you need help quitting, ask your health care provider.  · Return to your normal activities as told by your health care provider. Ask your health care provider what activities are safe for you.  General instructions  · Take over-the-counter and prescription medicines only as told by your health care provider.  · Drink enough fluid to keep your urine pale yellow.  · Keep all follow-up visits as told by your health care provider. This is important.  How is this prevented?    There is no vaccine to help prevent COVID-19 infection. However, there are steps you can take to protect yourself and others from this virus.  To protect yourself:   · Do not travel to areas where COVID-19 is a risk. The areas where COVID-19 is reported change often. To identify  high-risk areas and travel restrictions, check the [facility] travel website: wwwnc.cdc.gov/travel/notices  · If you live in, or must travel to, an area where COVID-19 is a risk, take precautions to avoid infection.  ? Stay away from people who are sick.  ? Wash your hands often with soap and water for 20 seconds. If soap and water are not available, use an alcohol-based hand .  ? Avoid touching your mouth, face, eyes, or nose.  ? Avoid going out in public, follow guidance from your state and local health authorities.  ? If you must go out in public, wear a cloth face covering or face mask.  ? Disinfect objects and surfaces that are frequently touched every day. This may include:  § Counters and tables.  § Doorknobs and light switches.  § Sinks and faucets.  § Electronics, such as phones, remote controls, keyboards, computers, and tablets.  To protect others:  If you have symptoms of COVID-19, take steps to prevent the virus from spreading to others.  · If you think you have a COVID-19 infection, contact your health care provider right away. Tell your health care team that you think you may have a COVID-19 infection.  · Stay home. Leave your house only to seek medical care. Do not use public transport.  · Do not travel while you are sick.  · Wash your hands often with soap and water for 20 seconds. If soap and water are not available, use alcohol-based hand .  · Stay away from other members of your household. Let healthy household members care for children and pets, if possible. If you have to care for children or pets, wash your hands often and wear a mask. If possible, stay in your own room, separate from others. Use a different bathroom.  · Make sure that all people in your household wash their hands well and often.  · Cough or sneeze into a tissue or your sleeve or elbow. Do not cough or sneeze into your hand or into the air.  · Wear a cloth face covering or face mask.  Where to find more  information  · Centers for Disease Control and Prevention: www.cdc.gov/coronavirus/2019-ncov/index.html  · World Health Organization: www.who.int/health-topics/coronavirus  Contact a health care provider if:  · You live in or have traveled to an area where COVID-19 is a risk and you have symptoms of the infection.  · You have had contact with someone who has COVID-19 and you have symptoms of the infection.  Get help right away if:  · You have trouble breathing.  · You have pain or pressure in your chest.  · You have confusion.  · You have bluish lips and fingernails.  · You have difficulty waking from sleep.  · You have symptoms that get worse.  These symptoms may represent a serious problem that is an emergency. Do not wait to see if the symptoms will go away. Get medical help right away. Call your local emergency services (911 in the [place] Do not drive yourself to the hospital. Let the emergency medical personnel know if you think you have COVID-19.  Summary  · COVID-19 is a respiratory infection that is caused by a virus. It is also known as coronavirus disease or novel coronavirus. It can cause serious infections, such as pneumonia, acute respiratory distress syndrome, acute respiratory failure, or sepsis.  · The virus that causes COVID-19 is contagious. This means that it can spread from person to person through droplets from coughs and sneezes.  · You are more likely to develop a serious illness if you are 65 years of age or older, have a weak immunity, live in a nursing home, or have chronic disease.  · There is no medicine to treat COVID-19. Your health care provider will talk with you about ways to treat your symptoms.  · Take steps to protect yourself and others from infection. Wash your hands often and disinfect objects and surfaces that are frequently touched every day. Stay away from people who are sick and wear a mask if you are sick.  This information is not intended to replace advice given to you by  your health care provider. Make sure you discuss any questions you have with your health care provider.  Document Released: 01/23/2020 Document Revised: 05/14/2020 Document Reviewed: 01/23/2020  [employer] Patient Education © 2020 [employer]  How to Quarantine at Home  Information for Patients and Families    These instructions are for people with confirmed or suspected COVID-19 who do not need to be hospitalized and those with confirmed COVID-19 who were hospitalized and discharged to care for themselves at home.    If you were tested through the Health Department  The Health Department will monitor your wellbeing.  If it is determined that you do not need to be hospitalized and can be isolated at home, you will be monitored by staff from your local or state health department.     If you were tested through a [facility]  You will need to monitor yourself and report changes in your symptoms to your doctor.  See the section below called Monitor Your Symptoms.    Follow these steps until a healthcare provider or local or state health department says you can return to your normal activities.    Stay home except to get medical care  • Restrict activities outside your home, except for getting medical care.   • Do not go to work, school, or public areas.   • Avoid using public transportation, ride-sharing, or taxis.    Separate yourself from other people and animals in your home  People  As much as possible, you should stay in a specific room and away from other people in your home. Also, you should use a separate bathroom, if available.    Animals  You should restrict contact with pets and other animals while you are sick with COVID-19, just like you would around other people. When possible, have another member of your household care for your animals while you are sick. If you are sick with COVID-19, avoid contact with your pet, including petting, snuggling, being kissed or licked, and sharing food. If you must care  for your pet or be around animals while you are sick, wash your hands before and after you interact with pets and wear a facemask. See COVID-19 and Animals for more information.    Call ahead before visiting your doctor  If you have a medical appointment, call the healthcare provider and tell them that you have or may have COVID-19. This information will help the healthcare provider’s office take steps to keep other people from getting infected or exposed.    Wear a facemask  You should wear a facemask when you are around other people (e.g., sharing a room or vehicle) or pets and before you enter a healthcare provider’s office.     If you are not able to wear a facemask (for example, because it causes trouble breathing), then people who live with you should not stay in the same room with you, or they should wear a facemask if they enter your room.    Cover your coughs and sneezes  • Cover your mouth and nose with a tissue when you cough or sneeze.   • Throw used tissues in a lined trash can.   • Immediately wash your hands with soap and water for at least 20 seconds or, if soap and water are not available, clean your hands with an alcohol-based hand  that contains at least 60% alcohol.    Clean your hands often  • Wash your hands often with soap and water for at least 20 seconds, especially after blowing your nose, coughing, or sneezing; going to the bathroom; and before eating or preparing food.     • If soap and water are not readily available, use an alcohol-based hand  with at least 60% alcohol, covering all surfaces of your hands and rubbing them together until they feel dry.    • Soap and water are the best option if hands are visibly dirty. Avoid touching your eyes, nose, and mouth with unwashed hands.    Avoid sharing personal household items  • You should not share dishes, drinking glasses, cups, eating utensils, towels, or bedding with other people or pets in your home.   • After using  these items, they should be washed thoroughly with soap and water.    Clean all “high-touch” surfaces everyday  • High touch surfaces include counters, tabletops, doorknobs, bathroom fixtures, toilets, phones, keyboards, tablets, and bedside tables.   • Also, clean any surfaces that may have blood, stool, or body fluids on them.   • Use a household cleaning spray or wipe, according to the label instructions. Labels contain instructions for safe and effective use of the cleaning product, including precautions you should take when applying the product, such as wearing gloves and making sure you have good ventilation during use of the product.    Monitor your symptoms  • Seek prompt medical attention if your illness is worsening (e.g., difficulty breathing).   • Before seeking care, call your healthcare provider and tell them that you have, or are being evaluated for, COVID-19.   • Put on a facemask before you enter the facility.     • These steps will help the healthcare provider’s office to keep other people in the office or waiting room from getting infected or exposed.   • Persons who are placed under active monitoring or facilitated self-monitoring should follow instructions provided by their local health department or occupational health professionals, as appropriate.  • If you have a medical emergency and need to call 911, notify the dispatch personnel that you have, or are being evaluated for COVID-19. If possible, put on a facemask before emergency medical services arrive.    Discontinuing home isolation  Patients with confirmed COVID-19 should remain under home isolation precautions until the risk of secondary transmission to others is thought to be low. The decision to discontinue home isolation precautions should be made on a case-by-case basis, in consultation with healthcare providers and state and local health departments.    The below content are for household members, intimate partners, and caregivers  of a patient with symptomatic laboratory-confirmed COVID-19 or a patient under investigation:    Household members, intimate partners, and caregivers may have close contact with a person with symptomatic, laboratory-confirmed COVID-19 or a person under investigation.     Close contacts should monitor their health; they should call their healthcare provider right away if they develop symptoms suggestive of COVID-19 (e.g., fever, cough, shortness of breath)     Close contacts should also follow these recommendations:  • Make sure that you understand and can help the patient follow their healthcare provider’s instructions for medication(s) and care. You should help the patient with basic needs in the home and provide support for getting groceries, prescriptions, and other personal needs.  • Monitor the patient’s symptoms. If the patient is getting sicker, call his or her healthcare provider and tell them that the patient has laboratory-confirmed COVID-19. This will help the healthcare provider’s office take steps to keep other people in the office or waiting room from getting infected. Ask the healthcare provider to call the local or [facility] health department for additional guidance. If the patient has a medical emergency and you need to call 911, notify the dispatch personnel that the patient has, or is being evaluated for COVID-19.  • Household members should stay in another room or be  from the patient as much as possible. Household members should use a separate bedroom and bathroom, if available.  • Prohibit visitors who do not have an essential need to be in the home.  • Household members should care for any pets in the home. Do not handle pets or other animals while sick.  For more information, see COVID-19 and Animals.  • Make sure that shared spaces in the home have good air flow, such as by an air conditioner or an opened window, weather permitting.  • Perform hand hygiene frequently. Wash your hands  often with soap and water for at least 20 seconds or use an alcohol-based hand  that contains 60 to 95% alcohol, covering all surfaces of your hands and rubbing them together until they feel dry. Soap and water should be used preferentially if hands are visibly dirty.  • Avoid touching your eyes, nose, and mouth with unwashed hands.  • The patient should wear a facemask when you are around other people. If the patient is not able to wear a facemask (for example, because it causes trouble breathing), you, as the caregiver, should wear a mask when you are in the same room as the patient.  • Wear a disposable facemask and gloves when you touch or have contact with the patient’s blood, stool, or body fluids, such as saliva, sputum, nasal mucus, vomit, or urine.   o Throw out disposable facemasks and gloves after using them. Do not reuse.  o When removing personal protective equipment, first remove and dispose of gloves. Then, immediately clean your hands with soap and water or alcohol-based hand . Next, remove and dispose of facemask, and immediately clean your hands again with soap and water or alcohol-based hand .  • Avoid sharing household items with the patient. You should not share dishes, drinking glasses, cups, eating utensils, towels, bedding, or other items. After the patient uses these items, you should wash them thoroughly (see below “Wash laundry thoroughly”).  • Clean all “high-touch” surfaces, such as counters, tabletops, doorknobs, bathroom fixtures, toilets, phones, keyboards, tablets, and bedside tables, every day. Also, clean any surfaces that may have blood, stool, or body fluids on them.   o Use a household cleaning spray or wipe, according to the label instructions. Labels contain instructions for safe and effective use of the cleaning product including precautions you should take when applying the product, such as wearing gloves and making sure you have good ventilation  during use of the product.  • Wash laundry thoroughly.   o Immediately remove and wash clothes or bedding that have blood, stool, or body fluids on them.  o Wear disposable gloves while handling soiled items and keep soiled items away from your body. Clean your hands (with soap and water or an alcohol-based hand ) immediately after removing your gloves.  o Read and follow directions on labels of laundry or clothing items and detergent. In general, using a normal laundry detergent according to washing machine instructions and dry thoroughly using the warmest temperatures recommended on the clothing label.  • Place all used disposable gloves, facemasks, and other contaminated items in a lined container before disposing of them with other household waste. Clean your hands (with soap and water or an alcohol-based hand ) immediately after handling these items. Soap and water should be used preferentially if hands are visibly dirty.  • Discuss any additional questions with your state or local health department or healthcare provider.    Adapted from information provided by the Centers for Disease Control and Prevention.  For more information, visit https://www.cdc.gov/coronavirus/2019-ncov/hcp/guidance-prevent-spread.html

## 2020-08-03 NOTE — PROGRESS NOTES
Subjective   Radha Torre is a 45 y.o. female.     Chief Complaint   Patient presents with   • Sore Throat      HPI patient requested video visit for concerned that she may have COVID.  This past Friday she had an onset of sore throat, runny nose and swollen eyes.  Typically she gets some allergy symptoms this time a year and is not sure if these could be allergies.  Her sore throat feels different than her normal postnasal drip sore throat.  Her sore throat currently is on one side of her throat only and is present all the time and worse than normal when she swallows.  It has a different feel than her normal sore throats.  She has a mild runny nose most of the time.  She also had an onset on Saturday and  of swollen eyes.  She initially thought she had pinkeye but then today her eye symptoms are mostly resolved. She denies loss of taste or smell.     She denies any known COVID exposures.  She has let her son be around other friends, but has been careful to avoid any groups.  Her spouse had a recent dental surgery and tested negative and her mother has tested negative twice.    She has been working from home and using her mask whenever she goes anywhere.  Social History     Tobacco Use   • Smoking status: Former Smoker     Packs/day: 0.50     Years: 3.00     Pack years: 1.50     Last attempt to quit: 2001     Years since quittin.2   • Smokeless tobacco: Never Used   Substance Use Topics   • Alcohol use: No   • Drug use: No       The following portions of the patient's history were reviewed and updated as appropriate: allergies, current medications, past family history, past medical history, past social history, past surgical history and problem list.    Review of Systems   Constitutional: Negative for chills, fatigue and fever.   HENT: Positive for rhinorrhea and sore throat. Negative for congestion, ear pain and trouble swallowing.    Eyes: Positive for redness. Negative for pain, discharge,  itching and visual disturbance.   Respiratory: Negative for cough and shortness of breath.    Cardiovascular: Negative for chest pain and palpitations.   Gastrointestinal: Negative for abdominal pain, diarrhea, nausea and vomiting.   Musculoskeletal: Negative for arthralgias and myalgias.   Skin: Negative for rash.   Neurological: Negative for dizziness, weakness and headaches.       Objective   not currently breastfeeding.  There is no height or weight on file to calculate BMI.    Physical Exam  Limited by video visit.  She is well appearing and does not seem to be distressed.  She seems alert and oriented and her mood and affect are normal, good historian of medical history.  No cough or dyspnea appreciated, able to complete sentences without problem.  She has not congested sounding.  I do not see evidence of conjunctivitis in this visit, however difficult to discern on video.      Assessment   Problem List Items Addressed This Visit     None      Visit Diagnoses     Sore throat    -  Primary    Relevant Orders    COVID-19,[facility] ROUTINE, NP/OP SWAB IN TRANSPORT MEDIA OR ESWAB 72 HR TAT - Swab, Nasopharynx           Procedures           Impression and Plan: Allergies vs viral URI:  New problem. Most likely she is having allergic rhinitis, but since COVID can also have similar sx she would like COVID test.  She cares for son and older mother and makes her anxious to think that she may be inadvertently making others sick.    She will need to quarantine at home until her results are back-usually 2-3 days for results.  We discussed s/s requiring f/u or emergent care, possibility of false negative results.  She will let me know if she has any worsening or concerning sx or changes in how she is feeling.       Health Maintenance Due   Topic Date Due   • HEPATITIS C SCREENING  11/02/2016   • INFLUENZA VACCINE  08/01/2020   • ANNUAL PHYSICAL  08/03/2020              EMR Dragon/Transcription disclaimer:   Much of this  encounter note is an electronic transcription/translation of spoken language to printed text. The electronic translation of spoken language may permit erroneous, or at times, nonsensical words or phrases to be inadvertently transcribed; Although I have reviewed the note for such errors, some may still exist.      Patient gave consent today for a telehealth video visit as following recommendations of our governor and CDC during the COVID-19 pandemic.    12 min was spent in discussion with pt and greater than 50% of that time was spent counseling.

## 2020-09-14 DIAGNOSIS — F41.9 ANXIETY: ICD-10-CM

## 2020-09-14 RX ORDER — HYDROXYZINE HYDROCHLORIDE 25 MG/1
TABLET, FILM COATED ORAL
Qty: 30 TABLET | Refills: 1 | Status: SHIPPED | OUTPATIENT
Start: 2020-09-14 | End: 2021-01-11

## 2020-09-15 DIAGNOSIS — F41.9 ANXIETY: ICD-10-CM

## 2020-09-16 RX ORDER — HYDROXYZINE HYDROCHLORIDE 25 MG/1
25 TABLET, FILM COATED ORAL NIGHTLY PRN
Qty: 30 TABLET | Refills: 1 | OUTPATIENT
Start: 2020-09-16

## 2020-11-15 DIAGNOSIS — F33.41 RECURRENT MAJOR DEPRESSIVE DISORDER, IN PARTIAL REMISSION (HCC): ICD-10-CM

## 2020-11-15 DIAGNOSIS — F41.9 ANXIETY: ICD-10-CM

## 2020-11-16 DIAGNOSIS — F33.41 RECURRENT MAJOR DEPRESSIVE DISORDER, IN PARTIAL REMISSION (HCC): ICD-10-CM

## 2020-11-16 DIAGNOSIS — F41.9 ANXIETY: ICD-10-CM

## 2020-11-16 RX ORDER — ESCITALOPRAM OXALATE 20 MG/1
20 TABLET ORAL DAILY
Qty: 90 TABLET | Refills: 0 | Status: SHIPPED | OUTPATIENT
Start: 2020-11-16 | End: 2021-02-16

## 2020-11-16 RX ORDER — ESCITALOPRAM OXALATE 20 MG/1
20 TABLET ORAL DAILY
Qty: 90 TABLET | Refills: 0 | OUTPATIENT
Start: 2020-11-16

## 2021-01-08 DIAGNOSIS — F41.9 ANXIETY: ICD-10-CM

## 2021-01-11 RX ORDER — HYDROXYZINE HYDROCHLORIDE 25 MG/1
TABLET, FILM COATED ORAL
Qty: 30 TABLET | Refills: 3 | OUTPATIENT
Start: 2021-01-11 | End: 2021-12-09

## 2021-02-15 DIAGNOSIS — F33.41 RECURRENT MAJOR DEPRESSIVE DISORDER, IN PARTIAL REMISSION (HCC): ICD-10-CM

## 2021-02-15 DIAGNOSIS — F41.9 ANXIETY: ICD-10-CM

## 2021-02-16 RX ORDER — ESCITALOPRAM OXALATE 20 MG/1
TABLET ORAL
Qty: 30 TABLET | Refills: 1 | Status: SHIPPED | OUTPATIENT
Start: 2021-02-16 | End: 2021-07-29 | Stop reason: SDUPTHER

## 2021-07-19 ENCOUNTER — OFFICE VISIT (OUTPATIENT)
Dept: FAMILY MEDICINE CLINIC | Facility: CLINIC | Age: 46
End: 2021-07-19

## 2021-07-19 VITALS
DIASTOLIC BLOOD PRESSURE: 93 MMHG | SYSTOLIC BLOOD PRESSURE: 130 MMHG | OXYGEN SATURATION: 98 % | HEIGHT: 68 IN | WEIGHT: 289.1 LBS | BODY MASS INDEX: 43.81 KG/M2 | TEMPERATURE: 97.7 F | HEART RATE: 101 BPM

## 2021-07-19 DIAGNOSIS — I49.3 PVC (PREMATURE VENTRICULAR CONTRACTION): Primary | ICD-10-CM

## 2021-07-19 PROCEDURE — 99214 OFFICE O/P EST MOD 30 MIN: CPT | Performed by: NURSE PRACTITIONER

## 2021-07-19 RX ORDER — BUPROPION HYDROCHLORIDE 150 MG/1
TABLET ORAL
COMMUNITY
Start: 2021-06-24 | End: 2021-07-29 | Stop reason: SDUPTHER

## 2021-07-19 NOTE — PROGRESS NOTES
"Chief Complaint  Palpitations (c/o palpitations x couple months, thinks it may have to do with her medication)    Subjective          Radha Torre presents to [facility]  History of Present Illness pt is here for heart palpitations that began appr the same time she started wellbutrin.  She started wellbutrin to augment lexapro which caused her mood to be a little blunted.  She drinks a lot of caffeine also.  Initially they happened only every now and then but have become more frequent and now occurring daily. Last appr 7-8 mins.  She has no associated chest pain, palpitations.  She has no dyspnea, cough, dizziness.  She has no problems with exertion and only notices this when she is sitting still and relaxing.      Had a previous negative cardiac w/u after \"false positive\" stress test and was seen by Dr. Boss who she would like to see again.     She really feels good on the Wellbutrin and was hoping not to have to stop it.    Objective   Vital Signs:   /93   Pulse 101   Temp 97.7 °F (36.5 °C)   Ht 172.7 cm (68\")   Wt 131 kg (289 lb 1.6 oz)   SpO2 98%   BMI 43.96 kg/m²     Physical Exam  Vitals and nursing note reviewed.   Constitutional:       General: She is not in acute distress.     Appearance: She is well-developed. She is not ill-appearing or diaphoretic.   HENT:      Head: Normocephalic and atraumatic.   Eyes:      General:         Right eye: No discharge.         Left eye: No discharge.      Conjunctiva/sclera: Conjunctivae normal.   Cardiovascular:      Rate and Rhythm: Normal rate and regular rhythm. Occasional extrasystoles are present.  Pulmonary:      Effort: Pulmonary effort is normal.      Breath sounds: Normal breath sounds.   Abdominal:      General: Bowel sounds are normal.      Palpations: Abdomen is soft.      Tenderness: There is no abdominal tenderness.   Musculoskeletal:         General: No deformity.      Cervical back: Neck supple.      " Comments: Gait smooth and steady   Skin:     General: Skin is warm and dry.   Neurological:      General: No focal deficit present.      Mental Status: She is alert and oriented to person, place, and time.   Psychiatric:         Mood and Affect: Mood normal.         Behavior: Behavior normal.        Result Review :            ECG 12 Lead    Date/Time: 7/20/2021 5:38 PM  Performed by: Sofia Mora APRN  Authorized by: Sofia Mora APRN   Comparison: not compared with previous ECG   Previous ECG: no previous ECG available  Rhythm: sinus tachycardia  Ectopy: infrequent PVCs  Rate: tachycardic  Conduction: non-specific intraventricular conduction delay  ST Segments: ST segments normal  T Waves: T waves normal  QRS axis: normal    Clinical impression: abnormal EKG  Comments: Cardiology referral              Assessment and Plan    Diagnoses and all orders for this visit:    1. PVC (premature ventricular contraction) (Primary)  -     Ambulatory Referral to Cardiology  -     ECG 12 Lead    EKG did show sinus tach with some PVCs. She may also have a IVCD. I think she should be evaluated by cardiology and I have put a referral into Dr. Thornton who she is requested. We discussed signs and symptoms that require emergent evaluation.    Her symptoms could be the result of the Wellbutrin and caffeine combo. I have asked her to contact her psychiatrist to discuss how he or she would want her to stop the Wellbutrin versus wean off of it. Also recommend decreasing caffeine consumption. She had TSH in 2019 that was normal. And previous normal electrolytes and creatinine.             Follow Up   No follow-ups on file.  Patient was given instructions and counseling regarding her condition or for health maintenance advice. Please see specific information pulled into the AVS if appropriate.

## 2021-07-20 PROCEDURE — 93000 ELECTROCARDIOGRAM COMPLETE: CPT | Performed by: NURSE PRACTITIONER

## 2021-07-29 DIAGNOSIS — F33.41 RECURRENT MAJOR DEPRESSIVE DISORDER, IN PARTIAL REMISSION (HCC): ICD-10-CM

## 2021-07-29 DIAGNOSIS — I10 ESSENTIAL HYPERTENSION: Primary | ICD-10-CM

## 2021-07-29 DIAGNOSIS — Z00.00 ROUTINE GENERAL MEDICAL EXAMINATION AT A HEALTH CARE FACILITY: ICD-10-CM

## 2021-07-29 DIAGNOSIS — F41.9 ANXIETY: ICD-10-CM

## 2021-07-29 RX ORDER — ESCITALOPRAM OXALATE 20 MG/1
20 TABLET ORAL DAILY
Qty: 90 TABLET | Refills: 0 | Status: SHIPPED | OUTPATIENT
Start: 2021-07-29 | End: 2021-12-29 | Stop reason: SDUPTHER

## 2021-07-29 RX ORDER — BUPROPION HYDROCHLORIDE 150 MG/1
150 TABLET ORAL EVERY MORNING
Qty: 90 TABLET | Refills: 0 | OUTPATIENT
Start: 2021-07-29 | End: 2021-12-09

## 2021-10-04 DIAGNOSIS — J45.40 MODERATE PERSISTENT ALLERGIC ASTHMA: Primary | ICD-10-CM

## 2021-11-01 DIAGNOSIS — F33.41 RECURRENT MAJOR DEPRESSIVE DISORDER, IN PARTIAL REMISSION (HCC): ICD-10-CM

## 2021-11-01 DIAGNOSIS — F41.9 ANXIETY: ICD-10-CM

## 2021-11-01 RX ORDER — BUPROPION HYDROCHLORIDE 150 MG/1
TABLET ORAL
Qty: 30 TABLET | Refills: 0 | OUTPATIENT
Start: 2021-11-01

## 2021-11-01 NOTE — TELEPHONE ENCOUNTER
Rx Refill Note  Requested Prescriptions     Pending Prescriptions Disp Refills   • buPROPion XL (WELLBUTRIN XL) 150 MG 24 hr tablet [Pharmacy Med Name: buPROPion HCL  MG TABLET] 30 tablet      Sig: TAKE ONE TABLET BY MOUTH EVERY MORNING      Last office visit with prescribing clinician: 7/19/2021      Next office visit with prescribing clinician: Visit date not found            Lora Mcdaniel MA  11/01/21, 11:52 EDT

## 2021-12-29 DIAGNOSIS — F41.9 ANXIETY: ICD-10-CM

## 2021-12-29 DIAGNOSIS — F33.41 RECURRENT MAJOR DEPRESSIVE DISORDER, IN PARTIAL REMISSION (HCC): ICD-10-CM

## 2021-12-30 RX ORDER — ESCITALOPRAM OXALATE 20 MG/1
20 TABLET ORAL DAILY
Qty: 90 TABLET | Refills: 0 | Status: SHIPPED | OUTPATIENT
Start: 2021-12-30 | End: 2022-04-04

## 2022-01-10 ENCOUNTER — OFFICE VISIT (OUTPATIENT)
Dept: FAMILY MEDICINE CLINIC | Facility: CLINIC | Age: 47
End: 2022-01-10

## 2022-01-10 VITALS
TEMPERATURE: 96.2 F | HEIGHT: 68 IN | WEIGHT: 293 LBS | DIASTOLIC BLOOD PRESSURE: 80 MMHG | BODY MASS INDEX: 44.41 KG/M2 | HEART RATE: 106 BPM | OXYGEN SATURATION: 99 % | SYSTOLIC BLOOD PRESSURE: 119 MMHG

## 2022-01-10 DIAGNOSIS — F41.9 ANXIETY: ICD-10-CM

## 2022-01-10 DIAGNOSIS — Z12.11 SCREEN FOR COLON CANCER: ICD-10-CM

## 2022-01-10 DIAGNOSIS — F33.41 RECURRENT MAJOR DEPRESSIVE DISORDER, IN PARTIAL REMISSION: ICD-10-CM

## 2022-01-10 DIAGNOSIS — Z12.31 ENCOUNTER FOR SCREENING MAMMOGRAM FOR MALIGNANT NEOPLASM OF BREAST: ICD-10-CM

## 2022-01-10 DIAGNOSIS — Z00.00 ROUTINE GENERAL MEDICAL EXAMINATION AT A HEALTH CARE FACILITY: Primary | ICD-10-CM

## 2022-01-10 DIAGNOSIS — R53.83 FATIGUE, UNSPECIFIED TYPE: ICD-10-CM

## 2022-01-10 PROCEDURE — 99396 PREV VISIT EST AGE 40-64: CPT | Performed by: NURSE PRACTITIONER

## 2022-01-10 RX ORDER — METOPROLOL TARTRATE 50 MG/1
50 TABLET, FILM COATED ORAL 2 TIMES DAILY
COMMUNITY
Start: 2022-01-03 | End: 2022-07-21 | Stop reason: SDUPTHER

## 2022-01-10 RX ORDER — LOSARTAN POTASSIUM 50 MG/1
50 TABLET ORAL DAILY
COMMUNITY
Start: 2021-11-20

## 2022-01-10 NOTE — PROGRESS NOTES
"Chief Complaint  Annual Exam (Physical )    Subjective          Radha Torre presents to [facility]  History of Present Illness here for physical.  Has overall been doing pretty well.  Planning to join weight watchers to lose weight which is one of her health goals this year.  She also has started exercising 3 times a week at [employer] with her daughter.  Does not enjoy it but enjoys being with her daughter.    Sees cardiologist for heart palpitations and hypertension.  Was taken off Wellbutrin which she does miss somewhat.  Is doing pretty well on current Lexapro.  She denies side effects of current medications.    She is under quite a bit of stress raising teenage daughter with Asperger.  She sometimes finds it difficult to care for herself.      Denies:  fever, chills,  unintentional weight change, weakness, rash, joint or muscle pain.  No HA, chest pain, cough, dyspnea, trouble swallowing, sore throat, ear of nose problems.  No abd pain, n/v/d/constipation, melena, reflux.  No urinary problems, hematuria.   Admits:  Fatigue, anxiety, depression. Has fear of flying and will have upcoming travel requests medication for trip.     Objective   Vital Signs:   /80   Pulse 106   Temp 96.2 °F (35.7 °C)   Ht 172.7 cm (68\")   Wt 134 kg (295 lb)   SpO2 99%   BMI 44.85 kg/m²     Physical Exam  Vitals and nursing note reviewed.   Constitutional:       General: She is not in acute distress.     Appearance: She is well-developed. She is obese. She is not ill-appearing or toxic-appearing.   HENT:      Head: Normocephalic and atraumatic.      Right Ear: Tympanic membrane, ear canal and external ear normal.      Left Ear: Tympanic membrane, ear canal and external ear normal.      Mouth/Throat:      Mouth: Mucous membranes are moist.      Pharynx: Uvula midline. No posterior oropharyngeal erythema.   Eyes:      General: No scleral icterus.        Right eye: No discharge.         " Left eye: No discharge.      Extraocular Movements: Extraocular movements intact.      Conjunctiva/sclera: Conjunctivae normal.      Pupils: Pupils are equal, round, and reactive to light.   Neck:      Thyroid: No thyromegaly.   Cardiovascular:      Rate and Rhythm: Normal rate and regular rhythm. Occasional extrasystoles are present.     Heart sounds: Normal heart sounds. No murmur heard.      Pulmonary:      Effort: Pulmonary effort is normal.      Breath sounds: Normal breath sounds.   Abdominal:      General: Bowel sounds are normal.      Palpations: Abdomen is soft.      Tenderness: There is no abdominal tenderness.   Musculoskeletal:         General: No deformity.      Cervical back: Neck supple.      Comments: Gait smooth and steady   Lymphadenopathy:      Cervical: No cervical adenopathy.   Skin:     General: Skin is warm and dry.   Neurological:      General: No focal deficit present.      Mental Status: She is alert and oriented to person, place, and time.   Psychiatric:         Mood and Affect: Mood normal.         Behavior: Behavior normal.        Result Review :                 Assessment and Plan    Diagnoses and all orders for this visit:    1. Routine general medical examination at a health care facility (Primary)  -     CBC & Differential  -     Comprehensive Metabolic Panel  -     Hemoglobin A1c  -     Lipid Panel With LDL / HDL Ratio  -     Microalbumin / Creatinine Urine Ratio - Urine, Clean Catch  -     TSH Rfx On Abnormal To Free T4  -     Hepatitis C Antibody    2. Screen for colon cancer  -     Ambulatory Referral For Screening Colonoscopy    3. Encounter for screening mammogram for malignant neoplasm of breast  -     Mammo Screening Bilateral With CAD; Future    4. Recurrent major depressive disorder, in partial remission (HCC)  -     Ambulatory Referral to Behavioral Health    5. Anxiety  -     Ambulatory Referral to Behavioral Health    6. Fatigue, unspecified type  -     Vitamin B12        As part of wellness and prevention, the following topics were discussed with the patient:   Nutrition-diet high in fresh/fozen veges, lower in carbs, unsaturated fats, and higher in lean proteins, adequate hydration   Physical activity/regular exercise-150 mins per week of moderate activity that increases HR and is enjoyable to lower stress and improve CVD     Healthy weight-above goal BMI  Substance misuse/abuse-none identified  Needs pap and will see GYN  mammo ordered  cscope ordered   Dental health-recommend twice per year dental cleans and daily flossing to lower inflammation in body   Mental health-stress mgmt and sleep hygiene   Immunization-recommended vaccines UTD, did not get flu this yr.    Referral to counseling for ongoing anxiety, depression.  Discussed role of cognitive behavioral therapy to help in weight loss and self-care.    Fear of flying-she will send me a message closer to trouble and will send her a prescription closer to time.  Most likely benzo at a low dose will be effective.  She would like 1 to try prior to make sure she is able to travel since she will be traveling along with her son which is reasonable.  And 1 for flight there and home.  Discussed side effects today.    Hypertension seems to be well controlled with current medications.  Her palpitations seem to be pretty well controlled with beta-blocker.  We will continue follow-up with cardiology as directed.    Anxiety and depression are not well controlled with current Lexapro.  She seemed to feel better on the Wellbutrin but it worsened her palpitations.  Could consider other medications in the future.    Not fasting for labs today.      Follow Up   Return in about 6 months (around 7/10/2022).  Patient was given instructions and counseling regarding her condition or for health maintenance advice. Please see specific information pulled into the AVS if appropriate.

## 2022-01-11 LAB
ALBUMIN SERPL-MCNC: 4.6 G/DL (ref 3.8–4.8)
ALBUMIN/CREAT UR: 7 MG/G CREAT (ref 0–29)
ALBUMIN/GLOB SERPL: 1.5 {RATIO} (ref 1.2–2.2)
ALP SERPL-CCNC: 106 IU/L (ref 44–121)
ALT SERPL-CCNC: 37 IU/L (ref 0–32)
AST SERPL-CCNC: 34 IU/L (ref 0–40)
BASOPHILS # BLD AUTO: 0.1 X10E3/UL (ref 0–0.2)
BASOPHILS NFR BLD AUTO: 1 %
BILIRUB SERPL-MCNC: <0.2 MG/DL (ref 0–1.2)
BUN SERPL-MCNC: 12 MG/DL (ref 6–24)
BUN/CREAT SERPL: 16 (ref 9–23)
CALCIUM SERPL-MCNC: 9.3 MG/DL (ref 8.7–10.2)
CHLORIDE SERPL-SCNC: 101 MMOL/L (ref 96–106)
CHOLEST SERPL-MCNC: 254 MG/DL (ref 100–199)
CO2 SERPL-SCNC: 24 MMOL/L (ref 20–29)
CREAT SERPL-MCNC: 0.73 MG/DL (ref 0.57–1)
CREAT UR-MCNC: 80.6 MG/DL
EOSINOPHIL # BLD AUTO: 0.4 X10E3/UL (ref 0–0.4)
EOSINOPHIL NFR BLD AUTO: 4 %
ERYTHROCYTE [DISTWIDTH] IN BLOOD BY AUTOMATED COUNT: 12.4 % (ref 11.7–15.4)
GLOBULIN SER CALC-MCNC: 3.1 G/DL (ref 1.5–4.5)
GLUCOSE SERPL-MCNC: 92 MG/DL (ref 65–99)
HBA1C MFR BLD: 5.5 % (ref 4.8–5.6)
HCT VFR BLD AUTO: 40.6 % (ref 34–46.6)
HCV AB S/CO SERPL IA: <0.1 S/CO RATIO (ref 0–0.9)
HDLC SERPL-MCNC: 45 MG/DL
HGB BLD-MCNC: 13.8 G/DL (ref 11.1–15.9)
IMM GRANULOCYTES # BLD AUTO: 0 X10E3/UL (ref 0–0.1)
IMM GRANULOCYTES NFR BLD AUTO: 0 %
LDLC SERPL CALC-MCNC: 134 MG/DL (ref 0–99)
LDLC/HDLC SERPL: 3 RATIO (ref 0–3.2)
LYMPHOCYTES # BLD AUTO: 2.5 X10E3/UL (ref 0.7–3.1)
LYMPHOCYTES NFR BLD AUTO: 27 %
MCH RBC QN AUTO: 29.7 PG (ref 26.6–33)
MCHC RBC AUTO-ENTMCNC: 34 G/DL (ref 31.5–35.7)
MCV RBC AUTO: 87 FL (ref 79–97)
MICROALBUMIN UR-MCNC: 5.9 UG/ML
MONOCYTES # BLD AUTO: 0.8 X10E3/UL (ref 0.1–0.9)
MONOCYTES NFR BLD AUTO: 8 %
NEUTROPHILS # BLD AUTO: 5.5 X10E3/UL (ref 1.4–7)
NEUTROPHILS NFR BLD AUTO: 60 %
PLATELET # BLD AUTO: 422 X10E3/UL (ref 150–450)
POTASSIUM SERPL-SCNC: 4.3 MMOL/L (ref 3.5–5.2)
PROT SERPL-MCNC: 7.7 G/DL (ref 6–8.5)
RBC # BLD AUTO: 4.65 X10E6/UL (ref 3.77–5.28)
SODIUM SERPL-SCNC: 139 MMOL/L (ref 134–144)
TRIGL SERPL-MCNC: 412 MG/DL (ref 0–149)
TSH SERPL DL<=0.005 MIU/L-ACNC: 2.01 UIU/ML (ref 0.45–4.5)
VIT B12 SERPL-MCNC: 448 PG/ML (ref 232–1245)
VLDLC SERPL CALC-MCNC: 75 MG/DL (ref 5–40)
WBC # BLD AUTO: 9.3 X10E3/UL (ref 3.4–10.8)

## 2022-01-11 RX ORDER — PRAVASTATIN SODIUM 20 MG
20 TABLET ORAL DAILY
Qty: 30 TABLET | Refills: 3 | Status: SHIPPED | OUTPATIENT
Start: 2022-01-11 | End: 2022-05-26

## 2022-01-12 ENCOUNTER — TRANSCRIBE ORDERS (OUTPATIENT)
Dept: ADMINISTRATIVE | Facility: HOSPITAL | Age: 47
End: 2022-01-12

## 2022-01-12 DIAGNOSIS — Z12.31 SCREENING MAMMOGRAM, ENCOUNTER FOR: Primary | ICD-10-CM

## 2022-01-22 DIAGNOSIS — F41.9 ANXIETY: ICD-10-CM

## 2022-01-24 DIAGNOSIS — F41.9 ANXIETY: ICD-10-CM

## 2022-01-24 RX ORDER — HYDROXYZINE HYDROCHLORIDE 25 MG/1
TABLET, FILM COATED ORAL
Qty: 30 TABLET | Refills: 3 | Status: SHIPPED | OUTPATIENT
Start: 2022-01-24 | End: 2022-01-24

## 2022-01-24 RX ORDER — HYDROXYZINE HYDROCHLORIDE 25 MG/1
TABLET, FILM COATED ORAL
Qty: 30 TABLET | Refills: 3 | Status: SHIPPED | OUTPATIENT
Start: 2022-01-24 | End: 2023-01-31

## 2022-02-28 ENCOUNTER — APPOINTMENT (OUTPATIENT)
Dept: MAMMOGRAPHY | Facility: HOSPITAL | Age: 47
End: 2022-02-28

## 2022-03-02 ENCOUNTER — APPOINTMENT (OUTPATIENT)
Dept: MAMMOGRAPHY | Facility: HOSPITAL | Age: 47
End: 2022-03-02

## 2022-03-07 ENCOUNTER — HOSPITAL ENCOUNTER (OUTPATIENT)
Dept: MAMMOGRAPHY | Facility: HOSPITAL | Age: 47
Discharge: HOME | End: 2022-03-07
Admitting: NURSE PRACTITIONER

## 2022-03-07 DIAGNOSIS — Z12.31 SCREENING MAMMOGRAM, ENCOUNTER FOR: ICD-10-CM

## 2022-03-07 PROCEDURE — 77063 BREAST TOMOSYNTHESIS BI: CPT

## 2022-03-07 PROCEDURE — 77067 SCR MAMMO BI INCL CAD: CPT

## 2022-03-08 DIAGNOSIS — R92.8 ABNORMAL MAMMOGRAM OF RIGHT BREAST: Primary | ICD-10-CM

## 2022-03-17 ENCOUNTER — PRE-PROCEDURE SCREENING (OUTPATIENT)
Dept: GASTROENTEROLOGY | Facility: CLINIC | Age: 47
End: 2022-03-17

## 2022-04-04 DIAGNOSIS — F33.41 RECURRENT MAJOR DEPRESSIVE DISORDER, IN PARTIAL REMISSION: ICD-10-CM

## 2022-04-04 DIAGNOSIS — F41.9 ANXIETY: ICD-10-CM

## 2022-04-04 RX ORDER — ESCITALOPRAM OXALATE 20 MG/1
TABLET ORAL
Qty: 30 TABLET | Refills: 0 | Status: SHIPPED | OUTPATIENT
Start: 2022-04-04 | End: 2022-04-21 | Stop reason: SDUPTHER

## 2022-04-04 NOTE — TELEPHONE ENCOUNTER
Rx Refill Note  Requested Prescriptions     Pending Prescriptions Disp Refills   • escitalopram (LEXAPRO) 20 MG tablet [Pharmacy Med Name: ESCITALOPRAM 20 MG TABLET] 30 tablet      Sig: TAKE ONE TABLET BY MOUTH DAILY      Last office visit with prescribing clinician: 1/10/2022      Next office visit with prescribing clinician: 7/11/2022            Courtney Plaza MA  04/04/22, 13:04 EDT

## 2022-04-05 ENCOUNTER — APPOINTMENT (OUTPATIENT)
Dept: MAMMOGRAPHY | Facility: HOSPITAL | Age: 47
End: 2022-04-05

## 2022-04-05 ENCOUNTER — APPOINTMENT (OUTPATIENT)
Dept: ULTRASOUND IMAGING | Facility: HOSPITAL | Age: 47
End: 2022-04-05

## 2022-04-13 ENCOUNTER — TELEPHONE (OUTPATIENT)
Dept: FAMILY MEDICINE CLINIC | Facility: CLINIC | Age: 47
End: 2022-04-13

## 2022-04-13 DIAGNOSIS — R92.8 ABNORMAL MAMMOGRAM: Primary | ICD-10-CM

## 2022-04-13 NOTE — TELEPHONE ENCOUNTER
Patient needs order for bilateral diagnostic mammogram. They [facility].    Fax: [phone]  Ph: [phone]  Please advise

## 2022-04-18 ENCOUNTER — HOSPITAL ENCOUNTER (OUTPATIENT)
Dept: ULTRASOUND IMAGING | Facility: HOSPITAL | Age: 47
End: 2022-04-18

## 2022-04-18 ENCOUNTER — HOSPITAL ENCOUNTER (OUTPATIENT)
Dept: MAMMOGRAPHY | Facility: HOSPITAL | Age: 47
Discharge: HOME | End: 2022-04-18
Admitting: NURSE PRACTITIONER

## 2022-04-18 DIAGNOSIS — R92.8 ABNORMAL MAMMOGRAM OF RIGHT BREAST: ICD-10-CM

## 2022-04-18 PROCEDURE — G0279 TOMOSYNTHESIS, MAMMO: HCPCS

## 2022-04-18 PROCEDURE — 77065 DX MAMMO INCL CAD UNI: CPT

## 2022-04-19 ENCOUNTER — APPOINTMENT (OUTPATIENT)
Dept: MAMMOGRAPHY | Facility: HOSPITAL | Age: 47
End: 2022-04-19

## 2022-04-19 ENCOUNTER — APPOINTMENT (OUTPATIENT)
Dept: ULTRASOUND IMAGING | Facility: HOSPITAL | Age: 47
End: 2022-04-19

## 2022-04-19 DIAGNOSIS — R92.8 ABNORMAL MAMMOGRAM: Primary | ICD-10-CM

## 2022-04-21 ENCOUNTER — TELEMEDICINE (OUTPATIENT)
Dept: PSYCHIATRY | Facility: CLINIC | Age: 47
End: 2022-04-21

## 2022-04-21 DIAGNOSIS — F33.1 MAJOR DEPRESSIVE DISORDER, RECURRENT EPISODE, MODERATE: Chronic | ICD-10-CM

## 2022-04-21 DIAGNOSIS — F41.1 GENERALIZED ANXIETY DISORDER: Primary | Chronic | ICD-10-CM

## 2022-04-21 PROCEDURE — 90792 PSYCH DIAG EVAL W/MED SRVCS: CPT | Performed by: NURSE PRACTITIONER

## 2022-04-21 RX ORDER — ESCITALOPRAM OXALATE 20 MG/1
20 TABLET ORAL DAILY
Qty: 90 TABLET | Refills: 0 | Status: SHIPPED | OUTPATIENT
Start: 2022-04-21 | End: 2022-08-22

## 2022-04-21 RX ORDER — BUPROPION HYDROCHLORIDE 150 MG/1
TABLET ORAL
Qty: 30 TABLET | Refills: 1 | Status: SHIPPED | OUTPATIENT
Start: 2022-04-21 | End: 2022-05-20 | Stop reason: SINTOL

## 2022-04-21 NOTE — TREATMENT PLAN
Multi-Disciplinary Problems (from Behavioral Health Treatment Plan)    Active Problems     Problem: Anxiety  Start Date: 04/21/22    Problem Details: The patient self-scales this problem as a 7 with 10 being the worst.        Goal Priority Start Date Expected End Date End Date    Patient will develop and implement behavioral and cognitive strategies to reduce anxiety and irrational fears. -- 04/21/22 -- --    Goal Details: Progress toward goal:  Not appropriate to rate progress toward goal since this is the initial treatment plan.        Goal Intervention Frequency Start Date End Date    Help patient explore past emotional issues in relation to present anxiety. Q Month 04/21/22 --    Intervention Details: Duration of treatment until until discharged.        Goal Intervention Frequency Start Date End Date    Help patient develop an awareness of their cognitive and physical responses to anxiety. Q Month 04/21/22 --    Intervention Details: Duration of treatment until until discharged.              Problem: Depression  Start Date: 04/21/22    Problem Details: The patient self-scales this problem as a 4 with 10 being the worst.        Goal Priority Start Date Expected End Date End Date    Patient will demonstrate the ability to initiate new constructive life skills outside of sessions on a consistent basis. -- 04/21/22 -- --    Goal Details: Progress toward goal:  Not appropriate to rate progress toward goal since this is the initial treatment plan.        Goal Intervention Frequency Start Date End Date    Assist patient in setting attainable activities of daily living goals. PRN 04/21/22 --    Goal Intervention Frequency Start Date End Date    Provide education about depression Q Month 04/21/22 --    Intervention Details: Duration of treatment until until discharged.        Goal Intervention Frequency Start Date End Date    Assist patient in developing healthy coping strategies. Q Month 04/21/22 --    Intervention  Details: Duration of treatment until until discharged.                           I have discussed and reviewed this treatment plan with the patient and/or guardian.  The patient has verbally agreed with this treatment plan (no signatures are obtained at today's visit as the patient is a telehealth patient and is unable to print and sign this document, therefore verbal agreement is obtained).

## 2022-04-21 NOTE — PROGRESS NOTES
"This provider is located at the [facility] (through [facility]), [address], [facility], [address] using a secure [employer] Video Visit through [employer]. Patient is being seen remotely via telehealth at their home address in [place], and stated they are in a secure environment for this session. The patient's condition being diagnosed/treated is appropriate for telemedicine. The provider identified herself as well as her credentials.   The patient, and/or patients guardian, consent to be seen remotely, and when consent is given they understand that the consent allows for patient identifiable information to be sent to a third party as needed.   They may refuse to be seen remotely at any time. The electronic data is encrypted and password protected, and the patient and/or guardian has been advised of the potential risks to privacy not withstanding such measures.    You have chosen to receive care through a telehealth visit.  Do you consent to use a video/audio connection for your medical care today? Yes        Subjective   Radha Torre is a 46 y.o. female who presents today for initial evaluation     Chief Complaint:  Anxiety, depression    Accompanied by:Pt was alone for duration of appointment    History of Present Illness:   \"I am currently taking Lexapro, it has been very helpful for me.\" Per pt, she has been on Lexapro for about two years and it has been helpful for anxiety and depression. After some time, pt felt she needed a medication adjustment and saw a psychiatrist who added Wellbutrin XL. Pt reports she did very well on it for several months and then began to have palpitations. Pt admits that she was also drinking a lot of caffeine around this time. Pt was started on metoprolol and the Wellbutrin XL was discontinued. Pt would like to try Wellbutrin XL again since she is on metoprolol and has cut out caffeine. Pt has experienced depression off and on over the " years. Pt states that the first time she knew it was depression was after she had her first child. Pt had postpartum depression after her first and second child, but not the third. When COVID-19 started, her mother was diagnosed with breast cancer. While her mother was undergoing treatment, pt found out that her  was having an emotional affair with a coworker. Pt and her  have worked through it, but he still works in close proximity with this coworker. Pt's son who has Asperger's also came out as transgender. Pt reports it came out of nowhere and she admits that she didn't respond well at first. Pt states her son is very vulnerable and easily manipulated; he isn't emotionally mature. The issues are still present, but he isn't as confrontational. Pt and her son are rebuilding their relationship and things are going better. Pt reports that she had a mammogram and there was noted to be calcification and a change in her tissues. Pt may be having a biopsy mid-May. Pt states she has a lot of people to depend on her and is hoping for a positive outcome. Pt reports that she doesn't cry and has been feeling numb. She states if it wasn't for her children she probably wouldn't get out of bed. Pt doesn't look forward to anything other than things in her children's lives. Prior to the pandemic, pt says her life was fairly easy. The patient endorses significant symptoms of depression including: changes in sleep, reduced interests in activities, feelings of guilt, changes in energy level, change in appetite and decrease in social activity which have caused impairment in important areas of daily functioning. The patient rates their depression at a 4/10 on a 0-10 scale, with 10 being the worst. Pt reports having anxiety for as long as she can remember. Pt has difficulty controlling the worry, but it hasn't been as bad since starting the Lexapro. Pt would worry over routine, every day life situations. The patient  endorses significant symptoms of anxiety including: excessive anxiety and worry about a number of events or activities for more days than not, restlessness or feeling keyed up, being easily fatigued, muscle tension, sleep disturbance, catastrophic thinking and overanalyzes which have caused impairment in important areas of daily functioning. The patient rates their anxiety at a 7/10 on a 0-10 scale, with 10 being the worst. Prior to Lexapro, pt would have rated anxiety a 10/10 on the same scale.     Current Psychiatric Medications:  Lexapro 20 mg PO Daily (Pt has been on for about two years)  Hydroxyzine 25 mg PO QHS PRN for anxiety (leaves pt groggy the next day)    Prior Psychiatric Medications:  Lexapro - helpful  Hydroxyzine  Wellbutrin XL - helped, but had palpitations  Buspar - pt is unsure if it helped    Currently in Counseling or Therapy:  Denies    Prior Psychiatric Outpatient Care:  Pt has seen a psychiatrist in the past.   Pt has never seen a therapist.   PCP has been prescribing psychotropics as of lately    Prior Psychiatric Hospitalizations:  Denies    Previous Suicide Attempts:  Denies    Previous Self-Harming Behavior:  Denies    Any family history of suicide attempts:  Denies     Legal History, Arrests, or Incarcerations:  Never arrested    Violent Tendencies:  Denies    Developmental History:  The patient reports they were the result of a full-term pregnancy.  The patient reports they met all of their developmental milestones as expected.  The patient denies knowledge of the biological mother using alcohol or illicit/recreational substances during the pregnancy with the patient.    History of Seizures or TBI:  Denies    Highest Level of Education:  Some college    Employment:  [employer]   [employer]  Pt is an . Pt likes her job.      History:  Denies    Social History:  Born: [place], [place]  Marriage status:  x1. Pt has been  to her   "for 20 years. It is a good marriage  Children: 3 Children (18, 13, 8 YO). Pt's 19 YO has Asperger's. Pt is \"wicked smart\" in some areas. Her son struggles with emotional maturity and social development  Lives with: The patient's currently household consists of the patient,  and children   Any particular irma or Church the patient believes/follows: Mormonism    Abuse History:  Psychological: MGM  Physical: Denies  Sexual: Denies  Other: Denies    *Pt states she has abandonment issues. Her father left her mother when pt was 6 months old. He signed over his rights when pt was 2 YO. Her father has recently started messaging her. She responded to a few of them. Pt, her half-brother, and her mother moved in with her maternal grandparents when she was 2 YO or before. Looking back, pt feels her MGM was resentful towards her. She would name call, tell her that she was going to end up in nursing home, that no one would ever  her, etc.*    Patient's Support Network Includes:   and friends, Judaism friends, coworkers, mother          The following portions of the patient's history were reviewed and updated as appropriate: allergies, current medications, past family history, past medical history, past social history, past surgical history and problem list.          Past Medical History:  Past Medical History:   Diagnosis Date   • Anesthesia complication     mother sick post op   • Anxiety    • Arthritis     rt hand long finger   • Heavy menses    • Migraine    • Nasal deformity, acquired     diff breathing   • Pain in elbow joint     bilateral   • Seasonal allergic rhinitis        Social History:  Social History     Socioeconomic History   • Marital status:    Tobacco Use   • Smoking status: Former Smoker     Packs/day: 0.50     Years: 3.00     Pack years: 1.50     Quit date: 2001     Years since quittin.9   • Smokeless tobacco: Never Used   Substance and Sexual Activity   • Alcohol use: Yes     " Comment: Socially; few times a month   • Drug use: No   • Sexual activity: Defer       Family History:  Family History   Problem Relation Age of Onset   • Anxiety disorder Mother    • Depression Mother    • Stroke Mother    • Skin cancer Mother         BCE SCE   • Colon polyps Mother    • Breast cancer Mother    • Alcohol abuse Brother    • Coronary artery disease Maternal Grandfather    • Depression Maternal Grandmother    • Anxiety disorder Maternal Grandmother    • Hyperlipidemia Maternal Grandmother    • Hypertension Maternal Grandmother    • Heart disease Maternal Grandmother    • Autism Son    • Colon cancer Neg Hx    • Malig Hyperthermia Neg Hx        Past Surgical History:  Past Surgical History:   Procedure Laterality Date   •  SECTION      x 3   • D & C HYSTEROSCOPY ENDOMETRIAL ABLATION N/A 2018    Procedure: DILATATION AND CURETTAGE HYSTEROSCOPY NOVASURE ENDOMETRIAL ABLATION;  Surgeon: Puneet Crareno MD;  Location: [facility] OR [facility];  Service: Obstetrics/Gynecology   • TUBAL ABDOMINAL LIGATION     • WISDOM TOOTH EXTRACTION         Problem List:  Patient Active Problem List   Diagnosis   • Essential hypertension   • Class 3 obesity in adult   • Iron deficiency anemia due to chronic blood loss   • Menorrhagia with regular cycle   • Anxiety       Allergy:   Allergies   Allergen Reactions   • Adhesive Tape Rash     localized        Current Medications:   Current Outpatient Medications   Medication Sig Dispense Refill   • albuterol (PROVENTIL) (2.5 MG/3ML) 0.083% nebulizer solution USE ONE VIAL VIA NEBULIZATION BY MOUTH EVERY 6 HOURS AS NEEDED FOR WHEEZING 75 mL 0   • albuterol sulfate  (90 Base) MCG/ACT inhaler Inhale 2 puffs Every 4 (Four) Hours As Needed for Wheezing or Shortness of Air. 1 inhaler 3   • ASPIRIN 81 PO Take  by mouth.     • cetirizine (zyrTEC) 10 MG tablet Take 10 mg by mouth Daily.     • escitalopram (LEXAPRO) 20 MG tablet Take 1 tablet by mouth Daily. 90 tablet 0   •  hydrOXYzine (ATARAX) 25 MG tablet TAKE ONE TABLET BY MOUTH ONCE NIGHTLY AS NEEDED FOR ANXIETY 30 tablet 3   • ipratropium (ATROVENT) 0.02 % nebulizer solution INHALE 2.5 ML(S) EVERY 4 HOURS AS NEEDED FOR WHEEZING OR SHORTNESS OF AIR 62.5 mL 0   • losartan (COZAAR) 50 MG tablet      • metoprolol tartrate (LOPRESSOR) 50 MG tablet      • buPROPion XL (Wellbutrin XL) 150 MG 24 hr tablet Take 1 tablet PO QAM 30 tablet 1   • pravastatin (Pravachol) 20 MG tablet Take 1 tablet by mouth Daily. 30 tablet 3     No current facility-administered medications for this visit.       Review of Symptoms:    Review of Systems   Constitutional: Positive for activity change, appetite change and fatigue.   Psychiatric/Behavioral: Positive for sleep disturbance, depressed mood and stress. The patient is nervous/anxious.          Physical Exam:   Due to the remote nature of this encounter (virtual encounter), vitals were unable to be obtained.  Height stated at 68 inches.  Weight stated at 295 pounds.      Physical Exam  Neurological:      Mental Status: She is alert.   Psychiatric:         Attention and Perception: Attention and perception normal. She does not perceive auditory or visual hallucinations.         Mood and Affect: Affect normal. Mood is anxious.         Speech: Speech normal.         Behavior: Behavior normal. Behavior is cooperative.         Thought Content: Thought content normal. Thought content is not paranoid or delusional. Thought content does not include homicidal or suicidal ideation. Thought content does not include homicidal or suicidal plan.         Cognition and Memory: Cognition and memory normal.         Judgment: Judgment normal.           Mental Status Exam:   Hygiene:   good  Cooperation:  Cooperative  Eye Contact:  Good  Psychomotor Behavior:  Appropriate  Affect:  Appropriate  Mood: anxious  Speech:  Normal  Thought Process:  Linear  Thought Content:  Mood congruent  Suicidal:  None  Homicidal:   None  Hallucinations:  None  Delusion:  None  Memory:  Intact  Orientation:  Person, Place, Time and Situation  Reliability:  good  Insight:  Good  Judgement:  Good  Impulse Control:  Good  Physical/Medical Issues:  No        PHQ-9 Depression Screening  Little interest or pleasure in doing things? 3-->nearly every day   Feeling down, depressed, or hopeless? 3-->nearly every day   Trouble falling or staying asleep, or sleeping too much? 1-->several days   Feeling tired or having little energy? 3-->nearly every day   Poor appetite or overeating? 3-->nearly every day   Feeling bad about yourself - or that you are a failure or have let yourself or your family down? 3-->nearly every day   Trouble concentrating on things, such as reading the newspaper or watching television? 0-->not at all   Moving or speaking so slowly that other people could have noticed? Or the opposite - being so fidgety or restless that you have been moving around a lot more than usual? 0-->not at all   Thoughts that you would be better off dead, or of hurting yourself in some way? 0-->not at all   PHQ-9 Total Score 16   If you checked off any problems, how difficult have these problems made it for you to do your work, take care of things at home, or get along with other people? very difficult     PHQ-9 Total Score: 16        IZABELA 7 anxiety screening tool that patient filled out virtually reviewed by this APRN at today's encounter.    PROMIS scale screening tool that patient filled out virtually reviewed by this APRN at today's encounter.    [facility] request number [id number] reviewed by this APRN at today's encounter.    Previous Provider notes and available records reviewed by this APRN at today's encounter.     Patient screened positive for depression based on a PHQ-9 score of 16 on 4/21/2022. Follow-up recommendations include: Prescribed antidepressant medication treatment.        Lab Results:   No visits with results within 1 Month(s) from this  visit.   Latest known visit with results is:   Office Visit on 01/10/2022   Component Date Value Ref Range Status   • WBC 01/10/2022 9.3  3.4 - 10.8 x10E3/uL Final   • RBC 01/10/2022 4.65  3.77 - 5.28 x10E6/uL Final   • Hemoglobin 01/10/2022 13.8  11.1 - 15.9 g/dL Final   • Hematocrit 01/10/2022 40.6  34.0 - 46.6 % Final   • MCV 01/10/2022 87  79 - 97 fL Final   • MCH 01/10/2022 29.7  26.6 - 33.0 pg Final   • MCHC 01/10/2022 34.0  31.5 - 35.7 g/dL Final   • RDW 01/10/2022 12.4  11.7 - 15.4 % Final   • Platelets 01/10/2022 422  150 - 450 x10E3/uL Final   • Neutrophil Rel % 01/10/2022 60  Not Estab. % Final   • Lymphocyte Rel % 01/10/2022 27  Not Estab. % Final   • Monocyte Rel % 01/10/2022 8  Not Estab. % Final   • Eosinophil Rel % 01/10/2022 4  Not Estab. % Final   • Basophil Rel % 01/10/2022 1  Not Estab. % Final   • Neutrophils Absolute 01/10/2022 5.5  1.4 - 7.0 x10E3/uL Final   • Lymphocytes Absolute 01/10/2022 2.5  0.7 - 3.1 x10E3/uL Final   • Monocytes Absolute 01/10/2022 0.8  0.1 - 0.9 x10E3/uL Final   • Eosinophils Absolute 01/10/2022 0.4  0.0 - 0.4 x10E3/uL Final   • Basophils Absolute 01/10/2022 0.1  0.0 - 0.2 x10E3/uL Final   • Immature Granulocyte Rel % 01/10/2022 0  Not Estab. % Final   • Immature Grans Absolute 01/10/2022 0.0  0.0 - 0.1 x10E3/uL Final   • Glucose 01/10/2022 92  65 - 99 mg/dL Final   • BUN 01/10/2022 12  6 - 24 mg/dL Final   • Creatinine 01/10/2022 0.73  0.57 - 1.00 mg/dL Final   • eGFR Non African Am 01/10/2022 100  >59 mL/min/1.73 Final   • eGFR African Am 01/10/2022 115  >59 mL/min/1.73 Final    Comment: **In accordance with recommendations from the NKF-ASN Task force,**    [facility] is in [facility] process of updating its eGFR calculation to the    2021 CKD-EPI creatinine equation that estimates kidney function    without a race variable.     • BUN/Creatinine Ratio 01/10/2022 16  9 - 23 Final   • Sodium 01/10/2022 139  134 - 144 mmol/L Final   • Potassium 01/10/2022 4.3  3.5 - 5.2 mmol/L  Final   • Chloride 01/10/2022 101  96 - 106 mmol/L Final   • Total CO2 01/10/2022 24  20 - 29 mmol/L Final   • Calcium 01/10/2022 9.3  8.7 - 10.2 mg/dL Final   • Total Protein 01/10/2022 7.7  6.0 - 8.5 g/dL Final   • Albumin 01/10/2022 4.6  3.8 - 4.8 g/dL Final   • Globulin 01/10/2022 3.1  1.5 - 4.5 g/dL Final   • A/G Ratio 01/10/2022 1.5  1.2 - 2.2 Final   • Total Bilirubin 01/10/2022 <0.2  0.0 - 1.2 mg/dL Final   • Alkaline Phosphatase 01/10/2022 106  44 - 121 IU/L Final                  **Please note reference interval change**   • AST (SGOT) 01/10/2022 34  0 - 40 IU/L Final   • ALT (SGPT) 01/10/2022 37 (A) 0 - 32 IU/L Final   • Hemoglobin A1C 01/10/2022 5.5  4.8 - 5.6 % Final    Comment:          Prediabetes: 5.7 - 6.4           Diabetes: >6.4           Glycemic control for adults with diabetes: <7.0     • Total Cholesterol 01/10/2022 254 (A) 100 - 199 mg/dL Final   • Triglycerides 01/10/2022 412 (A) 0 - 149 mg/dL Final   • HDL Cholesterol 01/10/2022 45  >39 mg/dL Final   • VLDL Cholesterol Jonnie 01/10/2022 75 (A) 5 - 40 mg/dL Final   • LDL Chol Calc (NIH) 01/10/2022 134 (A) 0 - 99 mg/dL Final   • LDL/HDL RATIO 01/10/2022 3.0  0.0 - 3.2 ratio Final    Comment:                                     LDL/HDL Ratio                                              Men  Women                                1/2 Avg.Risk  1.0    1.5                                    Avg.Risk  3.6    3.2                                 2X Avg.Risk  6.2    5.0                                 3X Avg.Risk  8.0    6.1     • Creatinine, Urine 01/10/2022 80.6  Not Estab. mg/dL Final   • Microalbumin, Urine 01/10/2022 5.9  Not Estab. ug/mL Final   • Microalbumin/Creatinine Ratio 01/10/2022 7  0 - 29 mg/g creat Final    Comment:                        Normal:                0 -  29                         Moderately increased: 30 - 300                         Severely increased:       >300     • TSH 01/10/2022 2.010  0.450 - 4.500 uIU/mL Final   • Hep  C Virus Ab 01/10/2022 <0.1  0.0 - 0.9 s/co ratio Final    Comment:                                   Negative:     < 0.8                               Indeterminate: 0.8 - 0.9                                    Positive:     > 0.9   The CDC recommends that a positive HCV antibody result   be followed up with a HCV Nucleic Acid Amplification   test (536955).     • Vitamin B-12 01/10/2022 448  232 - 1,245 pg/mL Final         Assessment/Plan   Problems Addressed this Visit    None     Visit Diagnoses     Generalized anxiety disorder  (Chronic)   -  Primary    Relevant Medications    escitalopram (LEXAPRO) 20 MG tablet    buPROPion XL (Wellbutrin XL) 150 MG 24 hr tablet    Other Relevant Orders    Ambulatory Referral to Psychiatry    Major depressive disorder, recurrent episode, moderate (HCC)  (Chronic)       Relevant Medications    escitalopram (LEXAPRO) 20 MG tablet    buPROPion XL (Wellbutrin XL) 150 MG 24 hr tablet    Other Relevant Orders    Ambulatory Referral to Psychiatry      Diagnoses       Codes Comments    Generalized anxiety disorder    -  Primary ICD-10-CM: F41.1  ICD-9-CM: 300.02     Major depressive disorder, recurrent episode, moderate (HCC)     ICD-10-CM: F33.1  ICD-9-CM: 296.32           Visit Diagnoses:    ICD-10-CM ICD-9-CM   1. Generalized anxiety disorder  F41.1 300.02   2. Major depressive disorder, recurrent episode, moderate (HCC)  F33.1 296.32          GOALS:  Short Term Goals: Patient will be compliant with medication, and patient will have no significant medication related side effects.  Patient will be engaged in psychotherapy as indicated.  Patient will report subjective improvement of symptoms.  Long term goals: To stabilize mood and treat/improve subjective symptoms, the patient will stay out of the hospital, the patient will be at an optimal level of functioning, and the patient will take all medications as prescribed.  The patient verbalized understanding and agreement with goals that  were mutually set.      TREATMENT PLAN:   Continue supportive psychotherapy efforts and medications as indicated.   -Continue Lexapro 20 mg PO Daily for anxiety and depression  -Start Wellbutrin  mg PO QAM for depression. Discussed with pt that she is to stop the medication immediately if she experiences palpitations. Pt is also limit caffeine. Pt verbalized understanding and was agreeable.    -Continue hydroxyzine 25 mg PO QHS PRN for anxiety from PCP. This APRN will take over prescription once she needs refills.   -Referral sent for therapy    Medication and treatment options, both pharmacological and non-pharmacological treatment options, discussed during today's visit, including any off label use of medication. Patient acknowledged and verbally consented with current treatment plan and was educated on the importance of compliance with treatment and follow-up appointments.        MEDICATION ISSUES:    Discussed treatment plan and medication options of prescribed medication as well as the risks, benefits, any black box warnings, and side effects including potential falls, possible impaired driving, and metabolic adversities among others, including any off label use of medication. Patient is agreeable to call the office with any worsening of symptoms or onset of side effects, or if any concerns or questions arise.  The contact information for the office is made available to the patient. Patient is agreeable to call 911 or go to the nearest ER should they begin having any SI/HI, or if any urgent concerns arise. No medication side effects or related complaints today.       SUICIDE RISK ASSESSMENT: Unalterable demographics and a history of mental health intervention indicate this patient is in a high risk category compared to the general population. At present, the patient denies active SI/HI, intentions, or plans at this time and agrees to seek immediate care should such thoughts develop. The patient verbalizes  understanding of how to access emergency care if needed and agrees to do so. Consideration of suicide risk and protective factors such as history, current presentation, individual strengths and weaknesses, psychosocial and environmental stressors and variables, psychiatric illness and symptoms, medical conditions and pain, took place in this interview. Based on those considerations, the patient is determined: within individual baseline and presenting no imminent risk for suicide or homicide. Other recommendations: The patient does not meet the criteria for inpatient admission and is not a safety risk to self or others at today's visit. Inpatient treatment offers no significant advantages over outpatient treatment for this patient at today's visit.      SAFETY PLAN:  Patient was given ample time for questions and fully participated in treatment planning.  Patient was encouraged to call the clinic with any questions or concerns.  Patient was informed of access to emergency care. If patient were to develop any significant symptomatology, suicidal ideation, homicidal ideation, any concerns, or feel unsafe at any time they are to call the clinic and if unable to get immediate assistance should immediately call 911 or go to the nearest emergency room.  The patient is advised to remove or secure (lock away) all lethal weapons (including guns) and sharps (including razors, scissors, knives, etc.).  All medications (including any prescribed and any over the counter medications) should be stored in a safe and secured location that is not obtainable by children/adolescents.  Patient was given an opportunity and encouraged to ask questions about their medication, illness, and treatment. Patient contracted verbally for the following: If you are experiencing an emotional crisis or have thoughts of harming yourself or others, please go to your nearest local emergency room or call 911. Will continue to re-assess medication response  and side effects frequently to establish efficacy and ensure safety. Risks, any black box warnings, side effects, off label usage, and benefits of medication and treatment discussed with patient, along with potential adverse side effects of current and/or newly prescribed medication, alternative treatment options, and OTC medications.  Patient verbalized understanding of potential risks, any off label use of medication, any black box warnings, and any side effects in their own words. The patient verbalized understanding and agreed to comply with the safety plan discussed in their own words.  Patient given the number to the office. Number also available to the 24- hour suicide hotline.      MEDS ORDERED DURING VISIT:  New Medications Ordered This Visit   Medications   • escitalopram (LEXAPRO) 20 MG tablet     Sig: Take 1 tablet by mouth Daily.     Dispense:  90 tablet     Refill:  0   • buPROPion XL (Wellbutrin XL) 150 MG 24 hr tablet     Sig: Take 1 tablet PO QAM     Dispense:  30 tablet     Refill:  1       Return in about 4 weeks (around 5/19/2022), or if symptoms worsen or fail to improve, for Recheck.     Treatment plan completed: 4/21/22    Progress toward goal: Not at goal    Functional Status: Moderate impairment     Prognosis: Good with Ongoing Treatment         This document has been electronically signed by ALEXIS Vergara  April 21, 2022 11:55 EDT    Some of the data in this electronic note has been brought forward from a previous encounter, any necessary changes have been made, it has been reviewed by this APRN, and it is accurate.    Please note that portions of this note were completed with a voice recognition program. Efforts were made to edit dictation, but occasionally words are mistranscribed.

## 2022-05-13 ENCOUNTER — HOSPITAL ENCOUNTER (OUTPATIENT)
Dept: MAMMOGRAPHY | Facility: HOSPITAL | Age: 47
Discharge: HOME | End: 2022-05-13
Admitting: NURSE PRACTITIONER

## 2022-05-13 VITALS
OXYGEN SATURATION: 97 % | TEMPERATURE: 98.4 F | RESPIRATION RATE: 18 BRPM | BODY MASS INDEX: 39.4 KG/M2 | SYSTOLIC BLOOD PRESSURE: 129 MMHG | HEART RATE: 74 BPM | WEIGHT: 260 LBS | HEIGHT: 68 IN | DIASTOLIC BLOOD PRESSURE: 90 MMHG

## 2022-05-13 DIAGNOSIS — R92.8 ABNORMAL MAMMOGRAM: ICD-10-CM

## 2022-05-13 PROCEDURE — 88305 TISSUE EXAM BY PATHOLOGIST: CPT | Performed by: NURSE PRACTITIONER

## 2022-05-13 PROCEDURE — 88342 IMHCHEM/IMCYTCHM 1ST ANTB: CPT | Performed by: NURSE PRACTITIONER

## 2022-05-13 PROCEDURE — 0 LIDOCAINE 1 % SOLUTION: Performed by: NURSE PRACTITIONER

## 2022-05-13 PROCEDURE — 88360 TUMOR IMMUNOHISTOCHEM/MANUAL: CPT | Performed by: NURSE PRACTITIONER

## 2022-05-13 PROCEDURE — 88341 IMHCHEM/IMCYTCHM EA ADD ANTB: CPT | Performed by: NURSE PRACTITIONER

## 2022-05-13 RX ORDER — LIDOCAINE HYDROCHLORIDE 10 MG/ML
1 INJECTION, SOLUTION INFILTRATION; PERINEURAL ONCE
Status: COMPLETED | OUTPATIENT
Start: 2022-05-13 | End: 2022-05-13

## 2022-05-13 RX ORDER — DIAZEPAM 5 MG/1
5 TABLET ORAL ONCE AS NEEDED
Status: DISCONTINUED | OUTPATIENT
Start: 2022-05-13 | End: 2022-05-14 | Stop reason: HOSPADM

## 2022-05-13 RX ADMIN — LIDOCAINE HYDROCHLORIDE 14 ML: 10; .005 INJECTION, SOLUTION EPIDURAL; INFILTRATION; INTRACAUDAL; PERINEURAL at 08:41

## 2022-05-13 RX ADMIN — Medication 1 ML: at 08:40

## 2022-05-13 NOTE — NURSING NOTE
Biopsy site to right medial breast clear with Dermabond dry and intact. No firmness or swelling noted at or around biopsy site. Denies pain. Ice pack with protective covering applied to biopsy site. Discharge instructions discussed with understanding voiced by patient. Copies provided to patient. No distress noted. To home via private vehicle accompanied by her .

## 2022-05-13 NOTE — H&P
Name: Radha Torre ADMIT: 2022   : 1975  PCP: Sofia Mora APRN    MRN: [id number] LOS: 0 days   AGE/SEX: 46 y.o. female  ROOM: Room/bed info not found       Chief complaint right breast calcifications    Present Illness or Internal History:  Patient is a 46 y.o. female presents with right breast calcifications.     Past Surgical History:  Past Surgical History:   Procedure Laterality Date   •  SECTION      x 3   • D & C HYSTEROSCOPY ENDOMETRIAL ABLATION N/A 2018    Procedure: DILATATION AND CURETTAGE HYSTEROSCOPY NOVASURE ENDOMETRIAL ABLATION;  Surgeon: Puneet Carreno MD;  [facility]: [facility];  Service: Obstetrics/Gynecology   • TUBAL ABDOMINAL LIGATION     • WISDOM TOOTH EXTRACTION         Past Medical History:  Past Medical History:   Diagnosis Date   • Anesthesia complication     mother sick post op   • Anxiety    • Arthritis     rt hand long finger   • Heavy menses    • Migraine    • Nasal deformity, acquired     diff breathing   • Pain in elbow joint     bilateral   • Seasonal allergic rhinitis        Home Medications:  (Not in a hospital admission)      Allergies:  Adhesive tape    Family History:  Family History   Problem Relation Age of Onset   • Anxiety disorder Mother    • Depression Mother    • Stroke Mother    • Skin cancer Mother         BCE SCE   • Colon polyps Mother    • Breast cancer Mother    • Alcohol abuse Brother    • Autism Son    • Depression Maternal Grandmother    • Anxiety disorder Maternal Grandmother    • Hyperlipidemia Maternal Grandmother    • Hypertension Maternal Grandmother    • Heart disease Maternal Grandmother    • Coronary artery disease Maternal Grandfather    • Colon cancer Neg Hx    • Malig Hyperthermia Neg Hx        Social History:  Social History     Tobacco Use   • Smoking status: Former Smoker     Packs/day: 0.50     Years: 3.00     Pack years: 1.50     Quit date: 2001     Years since quittin.0   • Smokeless  tobacco: Never Used   Substance Use Topics   • Alcohol use: Yes     Comment: Socially; few times a month   • Drug use: No        Objective     Physical Exam:    No exam performed today,    Vital Signs  Temp:  [98.4 °F (36.9 °C)] 98.4 °F (36.9 °C)  Heart Rate:  [85] 85  Resp:  [18] 18  BP: (126)/(80) 126/80    Anticipated Surgical Procedure:  tomosynthesis guieded right breast biopsy with clip placement    The risks, benefits and alternatives of this procedure have been discussed with the patient or responsible party: Yes        Desmond Zamarripa Jr., MD  05/13/22  08:23 EDT

## 2022-05-16 LAB
LAB AP CASE REPORT: NORMAL
LAB AP SPECIAL STAINS: NORMAL
LAB AP SYNOPTIC CHECKLIST: NORMAL
PATH REPORT.FINAL DX SPEC: NORMAL
PATH REPORT.GROSS SPEC: NORMAL

## 2022-05-17 DIAGNOSIS — D05.91 CARCINOMA IN SITU OF RIGHT BREAST, UNSPECIFIED TYPE: Primary | ICD-10-CM

## 2022-05-18 ENCOUNTER — TELEPHONE (OUTPATIENT)
Dept: SURGERY | Facility: CLINIC | Age: 47
End: 2022-05-18

## 2022-05-18 DIAGNOSIS — C50.919 MALIGNANT NEOPLASM OF FEMALE BREAST, UNSPECIFIED ESTROGEN RECEPTOR STATUS, UNSPECIFIED LATERALITY, UNSPECIFIED SITE OF BREAST: Primary | ICD-10-CM

## 2022-05-19 ENCOUNTER — TELEPHONE (OUTPATIENT)
Dept: SURGERY | Facility: CLINIC | Age: 47
End: 2022-05-19

## 2022-05-19 NOTE — TELEPHONE ENCOUNTER
Attempted to contact patient. Left voicemail stating if she wants to come in to get genetics done before her appointment 5/24. Provided my name and office contact information.

## 2022-05-20 ENCOUNTER — CLINICAL SUPPORT (OUTPATIENT)
Dept: SURGERY | Facility: CLINIC | Age: 47
End: 2022-05-20

## 2022-05-20 ENCOUNTER — HOSPITAL ENCOUNTER (OUTPATIENT)
Dept: MRI IMAGING | Facility: HOSPITAL | Age: 47
Discharge: HOME | End: 2022-05-20
Admitting: STUDENT IN AN ORGANIZED HEALTH CARE EDUCATION/TRAINING PROGRAM

## 2022-05-20 ENCOUNTER — HOSPITAL ENCOUNTER (OUTPATIENT)
Dept: MRI IMAGING | Facility: HOSPITAL | Age: 47
End: 2022-05-20

## 2022-05-20 DIAGNOSIS — C50.919 MALIGNANT NEOPLASM OF FEMALE BREAST, UNSPECIFIED ESTROGEN RECEPTOR STATUS, UNSPECIFIED LATERALITY, UNSPECIFIED SITE OF BREAST: ICD-10-CM

## 2022-05-20 PROCEDURE — A9577 INJ MULTIHANCE: HCPCS | Performed by: STUDENT IN AN ORGANIZED HEALTH CARE EDUCATION/TRAINING PROGRAM

## 2022-05-20 PROCEDURE — 82565 ASSAY OF CREATININE: CPT

## 2022-05-20 PROCEDURE — 0 GADOBENATE DIMEGLUMINE 529 MG/ML SOLUTION: Performed by: STUDENT IN AN ORGANIZED HEALTH CARE EDUCATION/TRAINING PROGRAM

## 2022-05-20 PROCEDURE — 77049 MRI BREAST C-+ W/CAD BI: CPT

## 2022-05-20 RX ADMIN — GADOBENATE DIMEGLUMINE 20 ML: 529 INJECTION, SOLUTION INTRAVENOUS at 09:25

## 2022-05-20 NOTE — PROGRESS NOTES
Mrs. Torre presented to the office today to submit a saliva sample for [employer] 9 gene Breast Cancer Stat Panel testing. Specimen was collected in [employer] Saliva Collection Kit [id number] , order placed for 9 gene breast cancer stat panel testing [employer] [id number] and submitted via [employer] Tracking # [id number].    While the patient was present I did proceed with updatiing her chart for her appointment on 5/24/22.

## 2022-05-23 ENCOUNTER — OFFICE VISIT (OUTPATIENT)
Dept: OBSTETRICS AND GYNECOLOGY | Age: 47
End: 2022-05-23

## 2022-05-23 VITALS
WEIGHT: 293 LBS | BODY MASS INDEX: 44.41 KG/M2 | HEIGHT: 68 IN | DIASTOLIC BLOOD PRESSURE: 80 MMHG | SYSTOLIC BLOOD PRESSURE: 134 MMHG

## 2022-05-23 DIAGNOSIS — C50.919 MALIGNANT NEOPLASM OF FEMALE BREAST, UNSPECIFIED ESTROGEN RECEPTOR STATUS, UNSPECIFIED LATERALITY, UNSPECIFIED SITE OF BREAST: ICD-10-CM

## 2022-05-23 DIAGNOSIS — Z01.419 ENCOUNTER FOR GYNECOLOGICAL EXAMINATION: Primary | ICD-10-CM

## 2022-05-23 LAB — CREAT BLDA-MCNC: 0.6 MG/DL (ref 0.6–1.3)

## 2022-05-23 PROCEDURE — 99386 PREV VISIT NEW AGE 40-64: CPT | Performed by: OBSTETRICS & GYNECOLOGY

## 2022-05-23 NOTE — PROGRESS NOTES
General Surgery Breast Cancer History and Physical Exam     Summary:    Radha Torre is a 46 y.o. lady. 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      A multidisciplinary plan has been formulated for the patient:    (1) Breast Surgical Oncology:  -Follow up [employer] 9 panel genetic testing. I will call her with results.   -Nurse navigator consult.   -Surgical plan: Plan for right breast wire localized lumpectomy.  All risk, benefits, and alternatives were explained to the patient who agreed and wished to proceed.  -Plastic surgery referral deferred.    (2) Medical Oncology:  -Appointment with Dr. Sharma 6/2/2022.     (3) Radiation Oncology:  -Will refer postoperatively for evaluation for radiation therapy.    (4) Other:   -Due for screening colonoscopy. Will discuss postoperatively.     Referring Provider: ALEXIS Toney    Chief Complaint: abnormal breast imaging    History of Present Illness: Ms. Radha Torre is a 46 y.o. year old lady, seen at the request of Sofia Mora APRN for a new diagnosis of right breast cancer.      The patient presents to the clinic to discuss her yearly mammogram. She reports that she generally has yearly mammograms, but due to COVID-19 her last one was a little late.The patient denies every having a breast biopsy. Her work-up is detailed in the oncologic history below.     She had a precancerous mole on her breast and was seen by dermatology for this. The patient denies any lumps, bumps, skin changes, and nipple discharge.She reports a family history in her mother of breast cancer. Denies any family history of ovarian cancer.     She has not had a colonoscopy before.     Workup of Current Diagnosis:    3/7/2022 Bilateral Screening Mammogram:  Indeterminate left breast asymmetry. Recommend further evaluation with CC spot compression and lateral views with Tomosynthesis and possible ultrasound.  Indeterminate right breast calcifications. Recommend further evaluation  with CC and lateral spot magnification views.  BI-RADS Category 0: Incomplete     4/18/2022 Bilateral Breast Diagnostic Mammogram:   With additional imaging of the left breast, the area of focal asymmetry in the middle third slightly medial to the plane of the nipple effaces and has an appearance consistent with normal breast parenchyma.  Additional mammographic imaging of the right breast demonstrates presence of a cluster of microcalcifications in the middle third lower-inner quadrant of the right breast. The calcifications do not  demonstrate dependent layering on 90 degree lateral imaging.    IMPRESSION:  1. There is a cluster of microcalcifications in the middle third lower-inner quadrant of the right breast. Correlation with a stereotactic guided right breast biopsy is recommended.  2. There are no findings suspicious for malignancy in the left breast.  BI-RADS Category 0: Incomplete. Needs additional imaging evaluation.    5/13/2022 Right Breast Stereotactic Biopsy:   Tomosynthesis guided targeting on the calcifications in the lower inner quadrant of the right breast  was performed. Multiple tissue specimens were obtained. A specimen radiograph was obtained demonstrating microcalcifications within the specimen. A tri bell shaped metallic clip was placed to lola the site. Postbiopsy mammography of the right breast demonstrates placement of a metallic clip at the 4 o'clock position without evidence for clip migration or for a postbiopsyhematoma.  The pathology result has returned as DCIS. This is concordant with imaging findings.  IMPRESSION:  Technically successful Tomosynthesis guided Mammotome vacuum assisted right breast biopsy with placement of a tri bell shaped metallic clip. The pathology result has returned as DCIS. Surgical consultation and excision are recommended.  BI-RADS Category 6: Known malignancy.    5/13/2022 Pathology:    Final Diagnosis   1. Breast, Right, 4:00 o'clock Biopsy:  Ductal carcinoma in-situ with               A. The ductal carcinoma in-situ is of intermediate nuclear grade with cribriform and micropapillary architecture and no necrosis.                 B. The ductal carcinoma in situ measures up to 7 mm on the slide.                 C. Microcalcifications are seen associated with the ductal carcinoma in situ      2022 Bilateral Breast MRI:   IMPRESSION AND RECOMMENDATION:    1.  A 2.1 cm biopsy cavity with two 1 cm focal areas of enhancement along the cavity margins represents the site of biopsy-proven malignancy. Surgical management is recommended with preoperative localization targeting the biopsy clip and residual calcifications, which measure up to at least 1.4 cm on the post biopsy mammogram.   2.  No MRI evidence of malignancy in the left breast.   BI-RADS Category 6: Known biopsy-proven malignancy    Gynecologic History:   G:3. P:3 AB:0  Age at first childbirth: 28-years-old  Lactation/How long: The patient did breast feed her three children.  Age at menarche: 14-years-old  Age at menopause: NA  Total years of oral contraceptive use: Has used oral contraception in the past.   Total years of hormone replacement therapy: None    Past Medical History:   She reports that she had palpitations as a side effect bupropion and now is on a beta-blocker.    Past Surgical History:    •  x3  • Endometrial ablation  • Skin lesion excisions  • Tubal ligation  • [place] tooth extraction    Family History:    • As above    Social History:  • Denies tobacco use  • Occasional alcohol use    Allergies:   Allergies   Allergen Reactions   • Bupropion Palpitations   • Adhesive Tape Rash     Localized if left on for extended periods of time       Medications:     Current Outpatient Medications:   •  albuterol (PROVENTIL) (2.5 MG/3ML) 0.083% nebulizer solution, USE ONE VIAL VIA NEBULIZATION BY MOUTH EVERY 6 HOURS AS NEEDED  FOR WHEEZING, Disp: 75 mL, Rfl: 0  •  albuterol sulfate  (90 Base) MCG/ACT inhaler, Inhale 2 puffs Every 4 (Four) Hours As Needed for Wheezing or Shortness of Air., Disp: 1 inhaler, Rfl: 3  •  ASPIRIN 81 PO, Take 81 mg by mouth Daily. Not taking due to biopsy, told to hold for 1 week, Disp: , Rfl:   •  cetirizine (zyrTEC) 10 MG tablet, Take 10 mg by mouth Daily., Disp: , Rfl:   •  escitalopram (LEXAPRO) 20 MG tablet, Take 1 tablet by mouth Daily., Disp: 90 tablet, Rfl: 0  •  hydrOXYzine (ATARAX) 25 MG tablet, TAKE ONE TABLET BY MOUTH ONCE NIGHTLY AS NEEDED FOR ANXIETY, Disp: 30 tablet, Rfl: 3  •  ipratropium (ATROVENT) 0.02 % nebulizer solution, INHALE 2.5 ML(S) EVERY 4 HOURS AS NEEDED FOR WHEEZING OR SHORTNESS OF AIR, Disp: 62.5 mL, Rfl: 0  •  losartan (COZAAR) 50 MG tablet, Take 50 mg by mouth Daily., Disp: , Rfl:   •  metoprolol tartrate (LOPRESSOR) 50 MG tablet, Take 50 mg by mouth 2 (Two) Times a Day., Disp: , Rfl:   •  pravastatin (Pravachol) 20 MG tablet, Take 1 tablet by mouth Daily., Disp: 30 tablet, Rfl: 3    Laboratory Values:    Labs from 1/10/2022 reviewed    Review of Systems:   Influenza-like illness: no fever, no  cough, no  sore throat, no  body aches, no loss of sense of taste or smell, no known exposure to person with Covid-19.  Constitutional: Negative for fevers or chills  HENT: Negative for hearing loss or runny nose  Eyes: Negative for vision changes or scleral icterus  Respiratory: Negative for cough or shortness of breath  Cardiovascular: Negative for chest pain or heart palpitations  Gastrointestinal: Negative for abdominal pain, nausea, vomiting, constipation, melena, or hematochezia  Genitourinary: Negative for hematuria or dysuria  Musculoskeletal: Negative for joint swelling or gait instability  Neurologic: Negative for tremors or seizures  Psychiatric: Negative for suicidal ideations or depression  All other systems reviewed and negative    Physical Exam:   • ECO -  Asymptomatic  • Constitutional: Well-developed well-nourished, no acute distress  • Eyes: Conjunctiva normal, sclera nonicteric  • ENMT: Hearing grossly normal, oral mucosa moist  • Neck: Supple, no palpable mass, trachea midline  • Respiratory: Clear to auscultation, normal inspiratory effort  • Cardiovascular: Regular rate, no peripheral edema, no jugular venous distention  • Breast: symmetric  o Right: No visible abnormalities on inspection while seated, with arms raised or hands on hips. No masses, skin changes, or nipple abnormalities.  o Left: No visible abnormalities on inspection while seated, with arms raised or hands on hips. No masses, skin changes, or nipple abnormalities.  o Biopsy site appreciated in right breast, otherwise no skin changes.   o No clinical chest wall involvement.  • Gastrointestinal: Soft, nontender  • Lymphatics (palpable nodes): No cervical, supraclavicular or axillary lymphadenopathy  • Skin:  Warm, dry, no rash on visualized skin surfaces  • Musculoskeletal: Symmetric strength, normal gait  • Psychiatric: Alert and oriented ×3, normal affect     Discussion:  I had an extensive discussion with the patient and her family about the nature of her breast cancer diagnosis. We reviewed the components of breast tissue including ducts and lobules. We reviewed her pathology report in detail. We reviewed breast cancer histology, including stage, grade, ER/MA receptors, HER2 receptors and how this applies to her diagnosis. We reviewed the basics of systemic and local/regional management of breast cancer.     We discussed that most breast cancer is not hereditary, however given her age and family history, this may play a role in her case. I believe genetic testing is warranted and could affect surgical decision making.     We reviewed potential surgical treatments to include partial mastectomy, mastectomy, sentinel lymph node biopsy and axillary node dissection and discussed the rationale  associated with each approach. Regarding radiation therapy, we discussed that radiation is indicated in all cases of breast conservation and in only limited circumstances following mastectomy. We discussed that the primary goal of adjuvant radiation is to decrease the likelihood of local recurrence.     In her case, she is a good candidate for lumpectomy and she would like to proceed with breast conservation. We discussed that lumpectomy would require preoperative wire-localization. We also discussed the risk of positive margins and that she must have negative margins for lumpectomy to be an appropriate oncologic procedure. I will make every effort to obtain negative margins at her initial operation, but there is a 10-15% chance that she will require a second operation for re-excision, or possibly a total mastectomy. We will not know the margin status until after her final pathology has returned.     I described additional risks and potential complications associated with surgery, including, but not limited to, bleeding, infection, deformity/poor cosmetic result, chronic pain, numbness, seroma, hematoma, deep venous thrombosis, skin flap necrosis, disease recurrence and the possibility of requiring additional surgery. We also discussed other treatment options including the option of not undergoing any surgical treatment and the risks associated with this including disease progression. She expressed an understanding of these factors and wished to proceed.    We discussed that in her case, systemic treatment would likely involve endocrine therapy/targeted therapy.     DAISY LUX M.D.  General and Endoscopic Surgery  [facility]    [address] [place] [address], [address]  [place], [place], [address]  P: [phone]  F: [phone]     Transcribed from ambient dictation for Daisy Lux MD by Alexa Lutz .  05/23/22   08:15 EDT    Patient verbalized consent to the visit recording.

## 2022-05-23 NOTE — PROGRESS NOTES
Subjective     Chief Complaint   Patient presents with   • Gynecologic Exam     New gyn: re-estab.care,last pap ,patricia 3/22,new diagnosis of breast cancer 1 week ago         History of Present Illness      Radha Torre is a very pleasant  46 y.o. female who presents for annual exam.  Mammo Exam recently done and diagnosed with breast cancer, Contraception yes, Exercise occasionally    Patient has not been in to see me for many years, she was recently diagnosed with breast cancer she has a follow-up appointment with Dr. Desirae Romero as well as hematology.  We spent a fair amount of time talking about imaging findings and potential treatment options    She is up-to-date on her wellness labs    He had an endometrial ablation in the past.  That helped significantly with her blood flow she now has just very light but regular periods    .      Obstetric History:  OB History        3    Para   3    Term   3            AB        Living   3       SAB        IAB        Ectopic        Molar        Multiple        Live Births   3               Menstrual History:     Patient's last menstrual period was 2022 (approximate).       Sexual History:       Past Medical History:   Diagnosis Date   • Anesthesia complication     BP DROPPED DUE TO EPIDURAL FROM C-SECTIONS   • Anxiety    • Breast cancer (HCC) 2022    Right, 4:00 o'clock; Ductal Carcinoma In-Situ; ER/KS +   • Deviated septum    • Migraine    • Pain in elbow joint     bilateral   • Seasonal allergic rhinitis      Past Surgical History:   Procedure Laterality Date   • BREAST BIOPSY Right 2022    Tomosynthesis guided Mammotome vacuum assisted right breast 4 o'clock position; a tribell shaped metallic clip was placed to lola the site; Pathology returned as DCIS; ER/KS +   •  SECTION      x 3   • D & C HYSTEROSCOPY ENDOMETRIAL ABLATION N/A 2018    Procedure: DILATATION AND CURETTAGE HYSTEROSCOPY NOVASURE ENDOMETRIAL  ABLATION;  Surgeon: Puneet Carreno MD;  Location: [facility] OR [facility];  Service: Obstetrics/Gynecology   • MOHS SURGERY Left     Left Elbow & Left Scalp   • SKIN LESION EXCISION Right     Right Breast, Benign   • TUBAL ABDOMINAL LIGATION Bilateral 2013    after last    • WISDOM TOOTH EXTRACTION         SOCIAL Hx:      The following portions of the patient's history were reviewed and updated as appropriate: allergies, current medications, past family history, past medical history, past social history, past surgical history and problem list.    Review of Systems        Except as outlined in history of physical illness, patient denies any changes in her GYN, , GI systems.  All other systems reviewed were negative.         Current Outpatient Medications:   •  albuterol (PROVENTIL) (2.5 MG/3ML) 0.083% nebulizer solution, USE ONE VIAL VIA NEBULIZATION BY MOUTH EVERY 6 HOURS AS NEEDED FOR WHEEZING, Disp: 75 mL, Rfl: 0  •  albuterol sulfate  (90 Base) MCG/ACT inhaler, Inhale 2 puffs Every 4 (Four) Hours As Needed for Wheezing or Shortness of Air., Disp: 1 inhaler, Rfl: 3  •  ASPIRIN 81 PO, Take 81 mg by mouth Daily. Not taking due to biopsy, told to hold for 1 week, Disp: , Rfl:   •  cetirizine (zyrTEC) 10 MG tablet, Take 10 mg by mouth Daily., Disp: , Rfl:   •  escitalopram (LEXAPRO) 20 MG tablet, Take 1 tablet by mouth Daily., Disp: 90 tablet, Rfl: 0  •  hydrOXYzine (ATARAX) 25 MG tablet, TAKE ONE TABLET BY MOUTH ONCE NIGHTLY AS NEEDED FOR ANXIETY, Disp: 30 tablet, Rfl: 3  •  ipratropium (ATROVENT) 0.02 % nebulizer solution, INHALE 2.5 ML(S) EVERY 4 HOURS AS NEEDED FOR WHEEZING OR SHORTNESS OF AIR, Disp: 62.5 mL, Rfl: 0  •  losartan (COZAAR) 50 MG tablet, Take 50 mg by mouth Daily., Disp: , Rfl:   •  metoprolol tartrate (LOPRESSOR) 50 MG tablet, Take 50 mg by mouth 2 (Two) Times a Day., Disp: , Rfl:   •  pravastatin (Pravachol) 20 MG tablet, Take 1 tablet by mouth Daily., Disp: 30 tablet, Rfl: 3  "  Objective   Physical Exam    /80   Ht 172.7 cm (68\")   Wt 133 kg (293 lb)   LMP 05/02/2022 (Approximate)   Breastfeeding No   BMI 44.55 kg/m²     General: Patient is alert and oriented and appears overall healthy  Neck: Is supple without thyromegaly, no carotid bruits and no lymphadenopathy  Lungs: Clear bilaterally, no wheezing, rhonchi, or rales.  Respiratory rate is normal  Breast: Even, symmetrical, no lymphadenopathy, no retraction, no masses or cysts  Heart: Regular rate and rhythm are appreciated, no murmurs or rubs are heard  Abdomen: Is soft, without organomegaly, bowel sounds are positive, there is no rebound or guarding and palpation does not produce any discomfort  Back: Nontender without CVA tenderness  Pelvic: External genitalia appear normal and consistent with mature female.  BUS normal                            Vagina is clean dry without discharge and appears adequately estrogenized, no lesions or masses are present                         Cervix is noninflamed without discharge or lesions.  There is no cervical motion tenderness.                Uterus is nonenlarged, without tenderness, and no masses or abnormalities are  present               Adnexa are non-enlarged, non tender               Rectal exam reveals adequate sphincter tone and no masses or lesions are appreciated on digital rectal examination.      Annual Well Woman Exam  Patient Active Problem List   Diagnosis   • Essential hypertension   • Class 3 obesity in adult   • Iron deficiency anemia due to chronic blood loss   • Menorrhagia with regular cycle   • Anxiety                 Assessment & Plan   Diagnoses and all orders for this visit:    1. Encounter for gynecological examination (Primary)  -     IGP, Apt HPV,rfx 16 / 18,45      Discussed today's findings and concerns with patient.  Continue to recommend regular exercise including cardiovascular and resistance training as well as  breast self-exam. Wellness lab, " mammography, & pap smear, in accordance with age guidelines.    I have encouraged her to call for today's test results if she has not received them within 10 days.  Patient is advised to call with any change in her condition or with any other questions, otherwise return  for annual examination.

## 2022-05-24 ENCOUNTER — OFFICE VISIT (OUTPATIENT)
Dept: SURGERY | Facility: CLINIC | Age: 47
End: 2022-05-24

## 2022-05-24 ENCOUNTER — NURSE NAVIGATOR (OUTPATIENT)
Dept: OTHER | Facility: HOSPITAL | Age: 47
End: 2022-05-24

## 2022-05-24 VITALS — WEIGHT: 293 LBS | HEIGHT: 68 IN | BODY MASS INDEX: 44.41 KG/M2

## 2022-05-24 DIAGNOSIS — D05.11 DUCTAL CARCINOMA IN SITU (DCIS) OF RIGHT BREAST: Primary | ICD-10-CM

## 2022-05-24 PROCEDURE — 99204 OFFICE O/P NEW MOD 45 MIN: CPT | Performed by: STUDENT IN AN ORGANIZED HEALTH CARE EDUCATION/TRAINING PROGRAM

## 2022-05-24 NOTE — PROGRESS NOTES
Referral received from Dr. Romero's office. Met Ms. Torre and her friend Mare during her surgery consult. I introduced myself and navigational services. She has a good understanding of her pathology and treatment options presented to her by Dr. Romero. After the consult she is leaning toward having a lumpectomy but will wait for her MRI results before making a final decision. She is comfortable with this plan and has no questions or concerns following the consult.     We discussed her support system and she stated she felt well supported and has no resource needs at this time. She will reach out if her financial needs change.     We discussed integrative therapies and other services at [facility]. I gave her a navigation folder with the following information: [facility] Cancer Support Network, Sharing Our Stories Breast Cancer Support Group, Cancer and Restorative Exercise (CARE), [employer] Exercise program, Together for Breast Cancer Survival, Guide for the Newly Diagnosed, [facility], [facility], Massage Therapy, Reiki Therapy, [employer] [facility], Cancer Nutrition, and Survivorship Clinic.    She verbalized appreciation for navigational services and she has my contact information and will call with any questions that arise.

## 2022-05-25 ENCOUNTER — PREP FOR SURGERY (OUTPATIENT)
Dept: OTHER | Facility: HOSPITAL | Age: 47
End: 2022-05-25

## 2022-05-25 DIAGNOSIS — D05.11 DUCTAL CARCINOMA IN SITU (DCIS) OF RIGHT BREAST: Primary | ICD-10-CM

## 2022-05-25 RX ORDER — DIAZEPAM 5 MG/1
5 TABLET ORAL ONCE
Status: CANCELLED | OUTPATIENT
Start: 2022-05-25 | End: 2022-05-25

## 2022-05-25 RX ORDER — CEFAZOLIN SODIUM 2 G/100ML
2 INJECTION, SOLUTION INTRAVENOUS ONCE
Status: CANCELLED | OUTPATIENT
Start: 2022-05-25 | End: 2022-05-25

## 2022-05-26 RX ORDER — PRAVASTATIN SODIUM 20 MG
TABLET ORAL
Qty: 30 TABLET | Refills: 3 | Status: SHIPPED | OUTPATIENT
Start: 2022-05-26 | End: 2022-09-28

## 2022-05-26 NOTE — TELEPHONE ENCOUNTER
Rx Refill Note  Requested Prescriptions     Pending Prescriptions Disp Refills   • pravastatin (PRAVACHOL) 20 MG tablet [Pharmacy Med Name: PRAVASTATIN SODIUM 20 MG TAB] 30 tablet 3     Sig: TAKE ONE TABLET BY MOUTH DAILY      Last office visit with prescribing clinician: 1/10/2022      Next office visit with prescribing clinician: 7/11/2022            Courtney Plaza MA  05/26/22, 16:40 EDT

## 2022-05-27 LAB
CYTOLOGIST CVX/VAG CYTO: NORMAL
CYTOLOGY CVX/VAG DOC CYTO: NORMAL
CYTOLOGY CVX/VAG DOC THIN PREP: NORMAL
DX ICD CODE: NORMAL
HIV 1 & 2 AB SER-IMP: NORMAL
HPV I/H RISK 4 DNA CVX QL PROBE+SIG AMP: NEGATIVE
OTHER STN SPEC: NORMAL
STAT OF ADQ CVX/VAG CYTO-IMP: NORMAL

## 2022-05-31 DIAGNOSIS — C50.919 MALIGNANT NEOPLASM OF FEMALE BREAST, UNSPECIFIED ESTROGEN RECEPTOR STATUS, UNSPECIFIED LATERALITY, UNSPECIFIED SITE OF BREAST: Primary | ICD-10-CM

## 2022-06-02 ENCOUNTER — LAB (OUTPATIENT)
Dept: OTHER | Facility: HOSPITAL | Age: 47
End: 2022-06-02

## 2022-06-02 ENCOUNTER — CONSULT (OUTPATIENT)
Dept: ONCOLOGY | Facility: CLINIC | Age: 47
End: 2022-06-02

## 2022-06-02 VITALS
HEIGHT: 65 IN | HEART RATE: 75 BPM | OXYGEN SATURATION: 95 % | DIASTOLIC BLOOD PRESSURE: 79 MMHG | WEIGHT: 293 LBS | TEMPERATURE: 97.1 F | BODY MASS INDEX: 48.82 KG/M2 | SYSTOLIC BLOOD PRESSURE: 112 MMHG | RESPIRATION RATE: 18 BRPM

## 2022-06-02 DIAGNOSIS — D05.11 DUCTAL CARCINOMA IN SITU (DCIS) OF RIGHT BREAST: Primary | ICD-10-CM

## 2022-06-02 DIAGNOSIS — D05.91 CARCINOMA IN SITU OF RIGHT BREAST, UNSPECIFIED TYPE: Primary | ICD-10-CM

## 2022-06-02 LAB
ALBUMIN SERPL-MCNC: 4.6 G/DL (ref 3.5–5.2)
ALBUMIN/GLOB SERPL: 1.5 G/DL
ALP SERPL-CCNC: 104 U/L (ref 39–117)
ALT SERPL W P-5'-P-CCNC: 24 U/L (ref 1–33)
ANION GAP SERPL CALCULATED.3IONS-SCNC: 7.1 MMOL/L (ref 5–15)
AST SERPL-CCNC: 24 U/L (ref 1–32)
BASOPHILS # BLD AUTO: 0.06 10*3/MM3 (ref 0–0.2)
BASOPHILS NFR BLD AUTO: 0.8 % (ref 0–1.5)
BILIRUB SERPL-MCNC: 0.4 MG/DL (ref 0–1.2)
BUN SERPL-MCNC: 9 MG/DL (ref 6–20)
BUN/CREAT SERPL: 13.6 (ref 7–25)
CALCIUM SPEC-SCNC: 9.3 MG/DL (ref 8.6–10.5)
CHLORIDE SERPL-SCNC: 100 MMOL/L (ref 98–107)
CO2 SERPL-SCNC: 30.9 MMOL/L (ref 22–29)
CREAT SERPL-MCNC: 0.66 MG/DL (ref 0.57–1)
DEPRECATED RDW RBC AUTO: 41.9 FL (ref 37–54)
EGFRCR SERPLBLD CKD-EPI 2021: 109.7 ML/MIN/1.73
EOSINOPHIL # BLD AUTO: 0.4 10*3/MM3 (ref 0–0.4)
EOSINOPHIL NFR BLD AUTO: 5 % (ref 0.3–6.2)
ERYTHROCYTE [DISTWIDTH] IN BLOOD BY AUTOMATED COUNT: 12.8 % (ref 12.3–15.4)
GLOBULIN UR ELPH-MCNC: 3.1 GM/DL
GLUCOSE SERPL-MCNC: 91 MG/DL (ref 65–99)
HCT VFR BLD AUTO: 42.5 % (ref 34–46.6)
HGB BLD-MCNC: 13.6 G/DL (ref 12–15.9)
IMM GRANULOCYTES # BLD AUTO: 0.03 10*3/MM3 (ref 0–0.05)
IMM GRANULOCYTES NFR BLD AUTO: 0.4 % (ref 0–0.5)
LYMPHOCYTES # BLD AUTO: 2.21 10*3/MM3 (ref 0.7–3.1)
LYMPHOCYTES NFR BLD AUTO: 27.7 % (ref 19.6–45.3)
MCH RBC QN AUTO: 28.4 PG (ref 26.6–33)
MCHC RBC AUTO-ENTMCNC: 32 G/DL (ref 31.5–35.7)
MCV RBC AUTO: 88.7 FL (ref 79–97)
MONOCYTES # BLD AUTO: 0.69 10*3/MM3 (ref 0.1–0.9)
MONOCYTES NFR BLD AUTO: 8.6 % (ref 5–12)
NEUTROPHILS NFR BLD AUTO: 4.6 10*3/MM3 (ref 1.7–7)
NEUTROPHILS NFR BLD AUTO: 57.5 % (ref 42.7–76)
NRBC BLD AUTO-RTO: 0 /100 WBC (ref 0–0.2)
PLATELET # BLD AUTO: 344 10*3/MM3 (ref 140–450)
PMV BLD AUTO: 11.1 FL (ref 6–12)
POTASSIUM SERPL-SCNC: 4.3 MMOL/L (ref 3.5–5.2)
PROT SERPL-MCNC: 7.7 G/DL (ref 6–8.5)
RBC # BLD AUTO: 4.79 10*6/MM3 (ref 3.77–5.28)
SODIUM SERPL-SCNC: 138 MMOL/L (ref 136–145)
WBC NRBC COR # BLD: 7.99 10*3/MM3 (ref 3.4–10.8)

## 2022-06-02 PROCEDURE — 80053 COMPREHEN METABOLIC PANEL: CPT | Performed by: INTERNAL MEDICINE

## 2022-06-02 PROCEDURE — 85025 COMPLETE CBC W/AUTO DIFF WBC: CPT | Performed by: INTERNAL MEDICINE

## 2022-06-02 PROCEDURE — 99215 OFFICE O/P EST HI 40 MIN: CPT | Performed by: INTERNAL MEDICINE

## 2022-06-02 PROCEDURE — 36415 COLL VENOUS BLD VENIPUNCTURE: CPT

## 2022-06-02 RX ORDER — LORATADINE 10 MG/1
10 TABLET ORAL DAILY
COMMUNITY
End: 2022-07-18

## 2022-06-02 NOTE — PROGRESS NOTES
Subjective   Radha Torre is a 47 y.o. female.  Referred by Sofia Mora for right breast ductal carcinoma in situ    History of Present Illness   Mr. Torre is a 47-year-old premenopausal  lady who presented with a screen detected abnormality of the right breast in March 2022.  She has had previous normal screening mammograms.  This mammogram had been delayed by about 8 months due to COVID-19.    3/7/2022-bilateral screening mammogram  Indeterminate left breast asymmetry.  Further evaluation recommended.  Indeterminate right breast calcifications.  Further evaluation recommended.    4/18/2022-bilateral diagnostic mammogram and ultrasound  The area of focal asymmetry in the middle third of the left breast effaces and consistent with normal breast parenchyma  Right breast-presence of cluster of microcalcifications in the middle third lower inner quadrant.  Right breast finding suspicious for malignancy.  Stereotactic guided biopsy recommended.    5/13/2022-right breast ear tactic biopsy  Pathology consistent with ductal carcinoma in situ  Intermediate grade  ER +85% strong  WV +85% strong    5/20/2022-bilateral breast MRI  There is a 2.1 cm biopsy cavity with two 1 cm focal areas of enhancement along the cavity margins representing the site of biopsy-proven malignancy.  Residual calcifications measure 1.4 cm on postbiopsy mammogram.  No MRI evidence of left breast malignancy.    5/25/2022-she has been evaluated by Dr. Romero and has been recommended to undergo a right breast lumpectomy.  Lumpectomy has not been scheduled yet.  Pending plastics evaluation by Dr. Bhakta.    5/29/2022-Invitae 9 gene stat panel negative.    Family history significant for diagnosis of breast cancer in her mother at the age of 74.  Her mother is a patient of mine, Ms. Marce Bell.   No other family history of breast ovarian pancreatic prostate carcinoma.  No history of melanomas.    Radha denies palpating any new  breast masses, nipple changes, skin changes or nipple discharge.    The following portions of the patient's history were reviewed and updated as appropriate: allergies, current medications, past family history, past medical history, past social history, past surgical history and problem list.    Past Medical History:   Diagnosis Date   • Abnormal ECG    • Anesthesia complication     BP DROPPED DUE TO EPIDURAL FROM C-SECTIONS   • Anxiety    • Breast cancer (HCC) 2022    Right breast ductal carcinoma in situ, ER/MA positive, Ki-67 5%   • Deviated septum    • Fissure, anal    • Hypertension 2017    noted at last few urgent care visits   • Migraine    • Pain in elbow joint     bilateral   • Rectal bleeding    • Seasonal allergic rhinitis         Past Surgical History:   Procedure Laterality Date   • BREAST BIOPSY Right 2022    Tomosynthesis guided Mammotome vacuum assisted right breast 4 o'clock position-Dr. Desmond Zamarripa, [facility]   •  SECTION N/A     x 3   • D & C HYSTEROSCOPY ENDOMETRIAL ABLATION N/A 2018    Procedure: DILATATION AND CURETTAGE HYSTEROSCOPY NOVASURE ENDOMETRIAL ABLATION;  Surgeon: Puneet Carreno MD;  Location: [facility] OR [facility] [place], [place];  Service: Obstetrics/Gynecology   • MOHS SURGERY Left     Left Elbow & Left Scalp   • SKIN LESION EXCISION Right     Right Breast, Benign   • TUBAL ABDOMINAL LIGATION Bilateral 2013    after last    • WISDOM TOOTH EXTRACTION Bilateral         Family History   Problem Relation Age of Onset   • Anxiety disorder Mother    • Depression Mother    • Stroke Mother    • Skin cancer Mother         BCE SCE   • Colon polyps Mother    • Breast cancer Mother 74        DCIS with invasive. DCIS Left Breast 2:30 position ER/MA +, Her2-. Left Breast UOQ Invasive DCIS is multifocal, Two benign sentinel lymph nodes, Pathologic stage: pT1a, N(sn)0.   • Arthritis Mother    • Cancer Mother         DCIS and invasive   • Alcohol abuse Brother    • Autism Son    •  "Learning disabilities Son         Autism   • Depression Maternal Grandmother    • Anxiety disorder Maternal Grandmother    • Hyperlipidemia Maternal Grandmother    • Hypertension Maternal Grandmother    • Heart disease Maternal Grandmother    • Arthritis Maternal Grandmother    • Coronary artery disease Maternal Grandfather    • Esophageal cancer Paternal Grandfather 85   • Colon cancer Neg Hx    • Malig Hyperthermia Neg Hx         Social History     Socioeconomic History   • Marital status:    Tobacco Use   • Smoking status: Former Smoker     Packs/day: 0.50     Years: 5.00     Pack years: 2.50     Types: Cigarettes     Quit date: 2001     Years since quittin.0   • Smokeless tobacco: Never Used   Vaping Use   • Vaping Use: Never used   Substance and Sexual Activity   • Alcohol use: Yes     Alcohol/week: 2.0 standard drinks     Types: 2 Drinks containing 0.5 oz of alcohol per week     Comment: Socially; few times a month   • Drug use: Never   • Sexual activity: Yes     Partners: Male     Birth control/protection: Surgical        OB History        3    Para   3    Term   3            AB        Living   3       SAB        IAB        Ectopic        Molar        Multiple        Live Births   3                 Allergies   Allergen Reactions   • Bupropion Palpitations   • Adhesive Tape Rash     Localized if left on for extended periods of time            Review of Systems   Constitutional: Negative.    HENT: Negative.    Eyes: Negative.    Respiratory: Negative.    Genitourinary: Negative.    Musculoskeletal: Positive for arthralgias.   Allergic/Immunologic: Negative.    Neurological: Negative.    Psychiatric/Behavioral: The patient is nervous/anxious.          Objective   Blood pressure 112/79, pulse 75, temperature 97.1 °F (36.2 °C), temperature source Temporal, resp. rate 18, height 165.1 cm (65\"), weight 134 kg (294 lb 8 oz), SpO2 95 %, not currently breastfeeding.   Physical " Exam  Vitals reviewed.   Constitutional:       Appearance: She is obese.   HENT:      Head: Normocephalic and atraumatic.      Nose: Nose normal.      Mouth/Throat:      Mouth: Mucous membranes are moist.   Eyes:      Extraocular Movements: Extraocular movements intact.      Pupils: Pupils are equal, round, and reactive to light.   Cardiovascular:      Rate and Rhythm: Normal rate and regular rhythm.      Pulses: Normal pulses.   Pulmonary:      Effort: Pulmonary effort is normal.   Musculoskeletal:         General: Normal range of motion.      Cervical back: Normal range of motion.   Neurological:      General: No focal deficit present.      Mental Status: She is alert and oriented to person, place, and time.   Psychiatric:         Mood and Affect: Mood normal.         Behavior: Behavior normal.         Thought Content: Thought content normal.         Judgment: Judgment normal.       Breast Exam: Right breast appears normal on inspection except for biopsy site changes in the lower inner quadrant.  No palpable abnormalities of the right breast.  No right axillary lymphadenopathy.  Left breast appears normal on inspection.  No palpable abnormalities of the left breast.  No palpable left axillary lymphadenopathy.    Lab on 06/02/2022   Component Date Value Ref Range Status   • Glucose 06/02/2022 91  65 - 99 mg/dL Final   • BUN 06/02/2022 9  6 - 20 mg/dL Final   • Creatinine 06/02/2022 0.66  0.57 - 1.00 mg/dL Final   • Sodium 06/02/2022 138  136 - 145 mmol/L Final   • Potassium 06/02/2022 4.3  3.5 - 5.2 mmol/L Final   • Chloride 06/02/2022 100  98 - 107 mmol/L Final   • CO2 06/02/2022 30.9 (A) 22.0 - 29.0 mmol/L Final   • Calcium 06/02/2022 9.3  8.6 - 10.5 mg/dL Final   • Total Protein 06/02/2022 7.7  6.0 - 8.5 g/dL Final   • Albumin 06/02/2022 4.60  3.50 - 5.20 g/dL Final   • ALT (SGPT) 06/02/2022 24  1 - 33 U/L Final   • AST (SGOT) 06/02/2022 24  1 - 32 U/L Final   • Alkaline Phosphatase 06/02/2022 104  39 - 117 U/L  Final   • Total Bilirubin 06/02/2022 0.4  0.0 - 1.2 mg/dL Final   • Globulin 06/02/2022 3.1  gm/dL Final   • A/G Ratio 06/02/2022 1.5  g/dL Final   • BUN/Creatinine Ratio 06/02/2022 13.6  7.0 - 25.0 Final   • Anion Gap 06/02/2022 7.1  5.0 - 15.0 mmol/L Final   • eGFR 06/02/2022 109.7  >60.0 mL/min/1.73 Final    National Kidney Foundation and American Society of Nephrology (ASN) Task Force recommended calculation based on the Chronic Kidney Disease Epidemiology Collaboration (CKD-EPI) equation refit without adjustment for race.   • WBC 06/02/2022 7.99  3.40 - 10.80 10*3/mm3 Final   • RBC 06/02/2022 4.79  3.77 - 5.28 10*6/mm3 Final   • Hemoglobin 06/02/2022 13.6  12.0 - 15.9 g/dL Final   • Hematocrit 06/02/2022 42.5  34.0 - 46.6 % Final   • MCV 06/02/2022 88.7  79.0 - 97.0 fL Final   • MCH 06/02/2022 28.4  26.6 - 33.0 pg Final   • MCHC 06/02/2022 32.0  31.5 - 35.7 g/dL Final   • RDW 06/02/2022 12.8  12.3 - 15.4 % Final   • RDW-SD 06/02/2022 41.9  37.0 - 54.0 fl Final   • MPV 06/02/2022 11.1  6.0 - 12.0 fL Final   • Platelets 06/02/2022 344  140 - 450 10*3/mm3 Final   • Neutrophil % 06/02/2022 57.5  42.7 - 76.0 % Final   • Lymphocyte % 06/02/2022 27.7  19.6 - 45.3 % Final   • Monocyte % 06/02/2022 8.6  5.0 - 12.0 % Final   • Eosinophil % 06/02/2022 5.0  0.3 - 6.2 % Final   • Basophil % 06/02/2022 0.8  0.0 - 1.5 % Final   • Immature Grans % 06/02/2022 0.4  0.0 - 0.5 % Final   • Neutrophils, Absolute 06/02/2022 4.60  1.70 - 7.00 10*3/mm3 Final   • Lymphocytes, Absolute 06/02/2022 2.21  0.70 - 3.10 10*3/mm3 Final   • Monocytes, Absolute 06/02/2022 0.69  0.10 - 0.90 10*3/mm3 Final   • Eosinophils, Absolute 06/02/2022 0.40  0.00 - 0.40 10*3/mm3 Final   • Basophils, Absolute 06/02/2022 0.06  0.00 - 0.20 10*3/mm3 Final   • Immature Grans, Absolute 06/02/2022 0.03  0.00 - 0.05 10*3/mm3 Final   • nRBC 06/02/2022 0.0  0.0 - 0.2 /100 WBC Final   Office Visit on 05/23/2022   Component Date Value Ref Range Status   • Diagnosis  05/23/2022 Comment   Final    NEGATIVE FOR INTRAEPITHELIAL LESION OR MALIGNANCY.   • Specimen adequacy: 05/23/2022 Comment   Final    Satisfactory for evaluation. No endocervical component is identified.   • Clinician Provided ICD-10: 05/23/2022 Comment   Final    Z01.419   • Performed by: 05/23/2022 Comment   Final    Susana Salamanca, Supervisory Cytotechnologist (ASCP)   • . 05/23/2022 .   Final   • Note: 05/23/2022 Comment   Final    Comment: The Pap smear is a screening test designed to aid in the detection of  premalignant and malignant conditions of the uterine cervix.  It is not a  diagnostic procedure and should not be used as the sole means of detecting  cervical cancer.  Both false-positive and false-negative reports do occur.     • Method: 05/23/2022 Comment   Final    Comment: This liquid based ThinPrep(R) pap test was screened with the  use of an image guided system.     • HPV Aptima 05/23/2022 Negative  Negative Final    Comment: This nucleic acid amplification test detects fourteen high-risk  HPV types (16,18,31,33,35,39,45,51,52,56,58,59,66,68) without  differentiation.     Hospital Outpatient Visit on 05/20/2022   Component Date Value Ref Range Status   • Creatinine 05/20/2022 0.60  0.60 - 1.30 mg/dL Final    Serial Number: [id number]:  [id number]   Hospital Outpatient Visit on 05/13/2022   Component Date Value Ref Range Status   • Case Report 05/13/2022    Final                    Value:Surgical Pathology Report                         Case: [id number]                                  Authorizing Provider:  Desmond Zamarripa Jr., MD Collected:           05/13/2022 08:44 AM          Ordering Location:     [facility]  Received:            05/13/2022 11:21 AM                                 Mammography                                                                  Pathologist:           Fransico Melgar MD                                                         Specimen:    Breast,  Right, right breast stereotactic biopsy, performed by dr augustin for calcs,                  specimen removed 0844, #cores 3, formalin 0846, clock face 4 oclock                       • Final Diagnosis 05/13/2022    Final                    Value:This result contains rich text formatting which cannot be displayed here.   • Synoptic Checklist 05/13/2022    Final                    Value:DCIS OF THE BREAST: Biopsy                            DCIS OF THE BREAST: BIOPSY - All Specimens                            Protocol posted: 6/30/2021                                                        SPECIMEN                               Procedure:    Needle biopsy                                Specimen Laterality:    Right                                                         TUMOR                               Tumor Site:    Clock position                                :    4 o'clock                              Histologic Type:    Ductal carcinoma in situ (DCIS)                              Architectural Patterns:    Cribriform                              Architectural Patterns:    Micropapillary                              Nuclear Grade:    Grade II (intermediate)                              Necrosis:    Not identified                              Microcalcifications:    Present in DCIS                             Breast Biomarker Reporting Template                            BREAST: BIOMARKER REPORTING TEMPLATE - All Specimens                            Protocol posted: 11/10/2021                                                           Test(s) Performed:                                     Estrogen Receptor (ER) Status:    Positive (greater than 10% of cells demonstrate nuclear positivity)                                    Percentage of Cells with Nuclear Positivity:    85 %                                   Average Intensity of Staining:    Strong                                  Test Type:    Food and Drug  Administration (FDA) cleared (test / vendor): Ventena                                  Primary Antibody:    SP1                                  Scoring System:    Velasquez                                    Proportion Score:    5                                    Intensity Score:    3                                    Total Velasquez Score:    8                                Test(s) Performed:                                     Progesterone Receptor (PgR) Status:    Positive                                    Percentage of Cells with Nuclear Positivity:    85 %                                   Average Intensity of Staining:    Strong                                  Test Type:    Food and Drug Administration (FDA) cleared (test / vendor): ventena                                  Primary Antibody:    1E2                                  Scoring System:    Velasquez                                    Proportion Score:    5                                    Intensity Score:    3                                    Total Velasquez Score:    8                                Test(s) Performed:    Ki-67                                  Percentage of Cells with Nuclear Positivity:    5 %                                 Primary Antibody:    30-9                                Cold Ischemia and Fixation Times:    Meet requirements specified in latest version of the ASCO / CAP Guidelines                                Cold Ischemia Time (minutes):    2 min                               Fixation Time (hours):    12 hours                               Testing Performed on Block Number(s):    1A                                                         METHODS                               Fixative:    Formalin                                Image Analysis:    Not performed      • Gross Description 05/13/2022    Final                    Value:This result contains rich text formatting which cannot be displayed here.   • Special Stains  05/13/2022    Final                    Value:This result contains rich text formatting which cannot be displayed here.        Mammo Stereotactic Breast Biopsy Initial With & Without Device    Result Date: 5/18/2022  Technically successful Tomosynthesis guided Mammotome vacuum assisted right breast biopsy with placement of a tri bell shaped metallic clip. The pathology result has returned as DCIS. Surgical consultation and excision are recommended.  BI-RADS Category 6: Known malignancy.  This report was finalized on 5/18/2022 10:28 PM by Dr. Desmond Zamarripa M.D.       Screening mammogram, bilateral diagnostic mammogram, breast MRI-images independently reviewed and interpreted by me.  Details summarized in the HPI.       Assessment & Plan       *Ductal carcinoma in situ of the right breast  · ER +85% strong, KY +85% strong, intermediate grade  Discussed at length the details of imaging and pathology report.Discussed the origin of breast cancer from the ducts and the lobules and the histological type of breast cancer based on site of origin. Discussed the receptor status including ER, KY and and their significance in determining the biology and treatment. Also discussed the importance of grade.   She has met with Dr. Romero and will be scheduled for right breast lumpectomy after she meets with plastics.  · Explained to her with DCIS the first step in treatment would be surgery followed by radiation if she undergoes a lumpectomy and followed by endocrine therapy to decrease the recurrence of DCIS in the ipsilateral breast as well as invasive cancer in the ipsilateral and contralateral breast.  · However if there is upstaging following surgery we would have to determine the need for adjuvant chemotherapy/endocrine therapy.  · I plan to see her postoperatively to discuss final pathology and adjuvant therapy recommendations.    *Anxiety/depression  · She is currently on Lexapro.  · Mood seems to be fairly well controlled  on the same.  · She will be referred to supportive oncology for further management of these medications.  · Explained to her that following surgery she would likely be started on tamoxifen hence we would have to avoid certain SSRIs which would potentially interact with tamoxifen.    *Bilateral elbow pain  · Chronic and unchanged  · Could be positional     *Obesity  · BMI 49  · Obesity is a risk factor for breast cancer    *[id number] to discuss pathology and further treatment.    60-minute spent on the encounter including reviewing the medical records, face-to-face time and documentation on the same day.

## 2022-06-03 ENCOUNTER — TELEPHONE (OUTPATIENT)
Dept: ONCOLOGY | Facility: CLINIC | Age: 47
End: 2022-06-03

## 2022-06-03 NOTE — TELEPHONE ENCOUNTER
"Clinical Case Management  Telephone     Distress Screening Follow-up    Diagnosis: Ductal carcinoma in situ of right breast     Location of Distress Screening: Other [facility], Telephone     Distress Level: 8 (6/2/2022 10:00 AM)    Physical Concerns:    Fatigue: Yes  Pain: No  Sleep: Yes  Substance abuse: No    Practical Problems:    : No  Housing: No  Transportation: No  Treatment decisions: No    Emotional Concerns:       Family Concerns:    Ability to have children: No    Spiritual Concerns:        Interventions:   1. Introduced OSW role and services provided by the Supportive Oncology Team  2. Practiced active listening and empathy  3. Shared available emotional support resources ([employer], [employer], OSW supportive counseling, and counseling in the community)     Comments:  Patient is a 47 year old woman with a recent diagnosis of ductal carcinoma in situ of right breast who was seen by Dr. Sharma on 6/2/2022. Patient completed the [employer] Distress Thermometer and Problems List for Patient on which she scored 8/10. Patient reported that the last 2 years have been \"hard\" and that many of these concerns existed prior to the cancer diagnosis.    Patient is currently on Lexapro, which is managed by her PCP however patient stated that she would feel more comfortable if Nina Regalado, psychiatric APRN on the Supportive Oncology Team would consult with patient to determine if it is the most effective and appropriate medication at this time. OSW will discuss with Nina Regalado.    Patient is interested in a consultation with Yoselin Tse as she believes she is struggling with \"over eating.\" OSW to discuss with Yoselin.     Patient stated that she is open to therapy but is comfortable searching for a therapist through her insurance. There are no needs at this time. Patient has this OSW's contact information should questions or needs arise.     DENY HouseW, CSW  Oncology Social " Worker   [place]

## 2022-06-07 ENCOUNTER — PREP FOR SURGERY (OUTPATIENT)
Dept: OTHER | Facility: HOSPITAL | Age: 47
End: 2022-06-07

## 2022-06-07 DIAGNOSIS — D05.11 DUCTAL CARCINOMA IN SITU (DCIS) OF RIGHT BREAST: Primary | ICD-10-CM

## 2022-06-07 RX ORDER — LORAZEPAM 1 MG/1
1 TABLET ORAL 2 TIMES DAILY PRN
Qty: 20 TABLET | Refills: 0 | Status: SHIPPED | OUTPATIENT
Start: 2022-06-07 | End: 2022-06-22

## 2022-06-07 RX ORDER — DIAZEPAM 5 MG/1
5 TABLET ORAL ONCE
Status: CANCELLED | OUTPATIENT
Start: 2022-07-20 | End: 2022-06-07

## 2022-06-07 RX ORDER — CEFAZOLIN SODIUM 2 G/100ML
2 INJECTION, SOLUTION INTRAVENOUS ONCE
Status: CANCELLED | OUTPATIENT
Start: 2022-07-20 | End: 2022-06-07

## 2022-06-21 ENCOUNTER — TELEPHONE (OUTPATIENT)
Dept: SURGERY | Facility: CLINIC | Age: 47
End: 2022-06-21

## 2022-06-21 DIAGNOSIS — D05.11 DUCTAL CARCINOMA IN SITU (DCIS) OF RIGHT BREAST: ICD-10-CM

## 2022-06-22 RX ORDER — LORAZEPAM 1 MG/1
TABLET ORAL
Qty: 20 TABLET | Refills: 0 | Status: SHIPPED | OUTPATIENT
Start: 2022-06-22 | End: 2022-07-08 | Stop reason: SDUPTHER

## 2022-06-23 ENCOUNTER — APPOINTMENT (OUTPATIENT)
Dept: MAMMOGRAPHY | Facility: HOSPITAL | Age: 47
End: 2022-06-23

## 2022-06-24 ENCOUNTER — NURSE NAVIGATOR (OUTPATIENT)
Dept: OTHER | Facility: HOSPITAL | Age: 47
End: 2022-06-24

## 2022-06-24 NOTE — PROGRESS NOTES
Called Ms. Torre to see how she was doing. Left a message with my contact information and asked her to call back at her convenience.

## 2022-07-01 ENCOUNTER — OFFICE VISIT (OUTPATIENT)
Dept: FAMILY MEDICINE CLINIC | Facility: CLINIC | Age: 47
End: 2022-07-01

## 2022-07-01 VITALS
HEART RATE: 80 BPM | HEIGHT: 65 IN | DIASTOLIC BLOOD PRESSURE: 78 MMHG | TEMPERATURE: 96.6 F | OXYGEN SATURATION: 96 % | BODY MASS INDEX: 49.01 KG/M2 | SYSTOLIC BLOOD PRESSURE: 102 MMHG

## 2022-07-01 DIAGNOSIS — I49.3 PVC (PREMATURE VENTRICULAR CONTRACTION): ICD-10-CM

## 2022-07-01 DIAGNOSIS — J45.20 MILD INTERMITTENT REACTIVE AIRWAY DISEASE WITHOUT COMPLICATION: Primary | ICD-10-CM

## 2022-07-01 PROCEDURE — 99213 OFFICE O/P EST LOW 20 MIN: CPT | Performed by: NURSE PRACTITIONER

## 2022-07-01 RX ORDER — IPRATROPIUM BROMIDE AND ALBUTEROL SULFATE 2.5; .5 MG/3ML; MG/3ML
3 SOLUTION RESPIRATORY (INHALATION) EVERY 4 HOURS PRN
Qty: 360 ML | Refills: 2 | Status: SHIPPED | OUTPATIENT
Start: 2022-07-01

## 2022-07-01 RX ORDER — BUPROPION HYDROCHLORIDE 150 MG/1
75 TABLET ORAL EVERY MORNING
COMMUNITY
Start: 2022-06-13 | End: 2022-09-08

## 2022-07-01 NOTE — PROGRESS NOTES
"Chief Complaint  Cough (C/o cough, chest congestion, wheezing, x2/5 wks )    Subjective    {Problem List  Visit Diagnosis   Encounters  Notes  Medications  Labs  Result Review Imaging  Media :23}    Radha Torre presents to [facility] PRIMARY [facility]ARE  Cough  This is a recurrent problem. The current episode started 1 to 4 weeks ago. The problem has been waxing and waning. The problem occurs every few minutes. The cough is productive of sputum. Associated symptoms include ear pain, headaches, postnasal drip, a sore throat, shortness of breath and wheezing. Pertinent negatives include no chest pain, chills, ear congestion, fever, heartburn, hemoptysis, myalgias, nasal congestion, rash, rhinorrhea, sweats or weight loss. The symptoms are aggravated by exercise.    Answers for HPI/ROS submitted by the patient on 7/1/2022  What is the primary reason for your visit?: Cough    Patient is here for cough and sore throat and some associated wheezing that has been going on for several weeks.  Has been taking Mucinex without much current improvement.  Cough is terrible and keeping her awake at night.  Mostly dry.  No current fever or chills.  No nausea vomiting diarrhea.  No real dyspnea.  Does have a past medical history of asthma.    Has met with breast surgeon and oncologist and has good understanding of treatment plan and feels like she is dealing with this as best she can.      Needs cardiology referral for continuity of care and prefers to be in   [facility].       Objective   Vital Signs:  /78   Pulse 80   Temp 96.6 °F (35.9 °C)   Ht 165.1 cm (65\")   SpO2 96%   BMI 49.01 kg/m²   Estimated body mass index is 49.01 kg/m² as calculated from the following:    Height as of this encounter: 165.1 cm (65\").    Weight as of 6/2/22: 134 kg (294 lb 8 oz).          Physical Exam  Vitals and nursing note reviewed.   Constitutional:       General: She is not in acute distress.     Appearance: " She is well-developed. She is ill-appearing (mildly). She is not diaphoretic.   HENT:      Head: Normocephalic and atraumatic.      Right Ear: Ear canal and external ear normal.      Left Ear: Ear canal and external ear normal.      Ears:      Comments: B/l TM dull     Mouth/Throat:      Mouth: Mucous membranes are moist.      Pharynx: Posterior oropharyngeal erythema present. No oropharyngeal exudate.   Eyes:      General:         Right eye: No discharge.         Left eye: No discharge.      Conjunctiva/sclera: Conjunctivae normal.   Cardiovascular:      Rate and Rhythm: Normal rate and regular rhythm.      Heart sounds: Normal heart sounds.   Pulmonary:      Effort: Pulmonary effort is normal.      Breath sounds: Normal breath sounds. No wheezing or rhonchi.      Comments: Little tight sounding upper lung fields  Abdominal:      General: Bowel sounds are normal.      Palpations: Abdomen is soft.      Tenderness: There is no abdominal tenderness.   Musculoskeletal:         General: No deformity.      Comments: Gait smooth and steady   Skin:     General: Skin is warm and dry.   Neurological:      General: No focal deficit present.      Mental Status: She is alert and oriented to person, place, and time.   Psychiatric:         Mood and Affect: Mood normal.         Behavior: Behavior normal.        Result Review :                Assessment and Plan   Diagnoses and all orders for this visit:    1. Mild intermittent reactive airway disease without complication (Primary)  -     ipratropium-albuterol (DUO-NEB) 0.5-2.5 mg/3 ml nebulizer; Take 3 mL by nebulization Every 4 (Four) Hours As Needed for Wheezing or Shortness of Air.  Dispense: 360 mL; Refill: 2  -     HYDROcod Polst-CPM Polst ER (Tussionex Pennkinetic ER) 10-8 MG/5ML ER suspension; Take 5 mL by mouth Every 12 (Twelve) Hours As Needed for Cough.  Dispense: 75 mL; Refill: 0    2. PVC (premature ventricular contraction)  -     Ambulatory Referral to  Cardiology    If no better with current treatment for cough will let me know.    Reviewed and discussed oncology and breast surgery notes.  Patient seems to be handling everything well and have good perspective.    Referral to cardiology for symptomatic PVCs for continuity of care.         Follow Up   No follow-ups on file.  Patient was given instructions and counseling regarding her condition or for health maintenance advice. Please see specific information pulled into the AVS if appropriate.

## 2022-07-08 DIAGNOSIS — D05.11 DUCTAL CARCINOMA IN SITU (DCIS) OF RIGHT BREAST: ICD-10-CM

## 2022-07-08 RX ORDER — LORAZEPAM 1 MG/1
1 TABLET ORAL 2 TIMES DAILY PRN
Qty: 20 TABLET | Refills: 0 | Status: SHIPPED | OUTPATIENT
Start: 2022-07-08 | End: 2022-08-18 | Stop reason: SDUPTHER

## 2022-07-10 ENCOUNTER — APPOINTMENT (OUTPATIENT)
Dept: GENERAL RADIOLOGY | Facility: HOSPITAL | Age: 47
End: 2022-07-10

## 2022-07-10 ENCOUNTER — HOSPITAL ENCOUNTER (EMERGENCY)
Facility: HOSPITAL | Age: 47
Discharge: HOME | End: 2022-07-10
Attending: EMERGENCY MEDICINE | Admitting: EMERGENCY MEDICINE

## 2022-07-10 VITALS
HEART RATE: 100 BPM | OXYGEN SATURATION: 96 % | HEIGHT: 67 IN | WEIGHT: 285 LBS | BODY MASS INDEX: 44.73 KG/M2 | DIASTOLIC BLOOD PRESSURE: 77 MMHG | TEMPERATURE: 98.1 F | SYSTOLIC BLOOD PRESSURE: 137 MMHG | RESPIRATION RATE: 18 BRPM

## 2022-07-10 DIAGNOSIS — J45.909 ASTHMATIC BRONCHITIS WITHOUT COMPLICATION, UNSPECIFIED ASTHMA SEVERITY, UNSPECIFIED WHETHER PERSISTENT: Primary | ICD-10-CM

## 2022-07-10 LAB
FLUAV RNA RESP QL NAA+PROBE: NOT DETECTED
FLUBV RNA RESP QL NAA+PROBE: NOT DETECTED
SARS-COV-2 RNA RESP QL NAA+PROBE: NOT DETECTED

## 2022-07-10 PROCEDURE — 63710000001 PREDNISONE PER 1 MG: Performed by: EMERGENCY MEDICINE

## 2022-07-10 PROCEDURE — 71045 X-RAY EXAM CHEST 1 VIEW: CPT

## 2022-07-10 PROCEDURE — 87636 SARSCOV2 & INF A&B AMP PRB: CPT | Performed by: EMERGENCY MEDICINE

## 2022-07-10 PROCEDURE — 99283 EMERGENCY DEPT VISIT LOW MDM: CPT

## 2022-07-10 RX ORDER — PREDNISONE 20 MG/1
60 TABLET ORAL ONCE
Status: COMPLETED | OUTPATIENT
Start: 2022-07-10 | End: 2022-07-10

## 2022-07-10 RX ORDER — AZITHROMYCIN 250 MG/1
500 TABLET, FILM COATED ORAL ONCE
Status: COMPLETED | OUTPATIENT
Start: 2022-07-10 | End: 2022-07-10

## 2022-07-10 RX ORDER — PREDNISONE 20 MG/1
60 TABLET ORAL DAILY
Qty: 12 TABLET | Refills: 0 | Status: SHIPPED | OUTPATIENT
Start: 2022-07-10 | End: 2022-07-14

## 2022-07-10 RX ORDER — AZITHROMYCIN 250 MG/1
TABLET, FILM COATED ORAL
Qty: 4 TABLET | Refills: 0 | Status: SHIPPED | OUTPATIENT
Start: 2022-07-10 | End: 2022-07-18

## 2022-07-10 RX ORDER — PREDNISONE 20 MG/1
60 TABLET ORAL DAILY
Qty: 15 TABLET | Refills: 0 | Status: SHIPPED | OUTPATIENT
Start: 2022-07-10 | End: 2022-07-10

## 2022-07-10 RX ADMIN — PREDNISONE 60 MG: 20 TABLET ORAL at 18:20

## 2022-07-10 RX ADMIN — AZITHROMYCIN 500 MG: 250 TABLET, FILM COATED ORAL at 18:20

## 2022-07-10 NOTE — DISCHARGE INSTRUCTIONS
We have given you the first dose of steroid and antibiotic here in the emergency room tonight.  You will need to continue prednisone and azithromycin medications at home starting tomorrow for 4 more days.  You may use your home nebulizer and inhaler as directed.  Please return to the emergency room for any worsening pain, fevers, cough, weakness, wheezing, difficulties breathing or any other concerns.

## 2022-07-10 NOTE — ED PROVIDER NOTES
EMERGENCY DEPARTMENT ENCOUNTER    Room Number:    Date seen:  7/10/2022  PCP: Sofia Mora APRN  Historian: Patient      HPI:  Chief Complaint: Cough, congestion  A complete HPI/ROS/PMH/PSH/SH/FH are unobtainable due to: N/A  Context: Radha Torre is a 47 y.o. female who presents to the ED c/o 3 weeks of persistent cough with congestion symptoms.  Patient has a history of asthma and bronchitis.  She has been using her home inhaler and nebulizer as she customarily would do but it does not seem to be improving her symptoms.  She denies any fevers.  She denies any nausea or vomiting or body aches.  There have been no known sick contacts.  She has had some headache pain just from the pressure of coughing and drainage.  She is hoping to get this upper respiratory illness resolved in the next week because she has a lumpectomy scheduled on the .            PAST MEDICAL HISTORY  Active Ambulatory Problems     Diagnosis Date Noted   • Essential hypertension 2018   • Class 3 obesity in adult 2018   • Iron deficiency anemia due to chronic blood loss 2018   • Menorrhagia with regular cycle 2018   • Anxiety 10/21/2019   • Breast cancer (HCC) 2022   • Ductal carcinoma in situ (DCIS) of right breast 2022     Resolved Ambulatory Problems     Diagnosis Date Noted   • No Resolved Ambulatory Problems     Past Medical History:   Diagnosis Date   • Abnormal ECG    • Anesthesia complication    • Asthma    • Depression    • Deviated septum    • Fissure, anal    • Hyperlipidemia    • Hypertension    • Migraine    • Obesity    • Pain in elbow joint    • Rectal bleeding    • Seasonal allergic rhinitis    • Urinary tract infection          PAST SURGICAL HISTORY  Past Surgical History:   Procedure Laterality Date   • BREAST BIOPSY Right 2022    Tomosynthesis guided Mammotome vacuum assisted right breast 4 o'clock position-Dr. Desmond Zamarripa, [facility]   •  SECTION  N/A     x 3   • D & C HYSTEROSCOPY ENDOMETRIAL ABLATION N/A 2018    Procedure: DILATATION AND CURETTAGE HYSTEROSCOPY NOVASURE ENDOMETRIAL ABLATION;  Surgeon: Puneet Carreno MD;  Location: [facility] OR [facility];  Service: Obstetrics/Gynecology   • ENDOMETRIAL ABLATION     • MOHS SURGERY Left     Left Elbow & Left Scalp   • SKIN LESION EXCISION Right     Right Breast, Benign   • TUBAL ABDOMINAL LIGATION Bilateral 2013    after last    • WISDOM TOOTH EXTRACTION Bilateral          FAMILY HISTORY  Family History   Problem Relation Age of Onset   • Anxiety disorder Mother    • Depression Mother    • Stroke Mother    • Skin cancer Mother         BCE SCE   • Colon polyps Mother    • Breast cancer Mother 74        DCIS with invasive. DCIS Left Breast 2:30 position ER/ND +, Her2-. Left Breast UOQ Invasive DCIS is multifocal, Two benign sentinel lymph nodes, Pathologic stage: pT1a, N(sn)0.   • Arthritis Mother    • Cancer Mother         DCIS and invasive   • Alcohol abuse Brother    • Autism Son    • Learning disabilities Son         Autism   • Depression Son    • Depression Maternal Grandmother    • Anxiety disorder Maternal Grandmother    • Hyperlipidemia Maternal Grandmother    • Hypertension Maternal Grandmother    • Heart disease Maternal Grandmother    • Arthritis Maternal Grandmother    • Stroke Maternal Grandmother    • Coronary artery disease Maternal Grandfather    • Esophageal cancer Paternal Grandfather 85   • Colon cancer Neg Hx    • Malig Hyperthermia Neg Hx          SOCIAL HISTORY  Social History     Socioeconomic History   • Marital status:    Tobacco Use   • Smoking status: Former Smoker     Packs/day: 1.00     Years: 5.00     Pack years: 5.00     Types: Cigarettes, Cigarettes     Quit date: 2001     Years since quittin.1   • Smokeless tobacco: Never Used   Vaping Use   • Vaping Use: Never used   Substance and Sexual Activity   • Alcohol use: Yes     Alcohol/week: 2.0 standard  drinks     Types: 2 Drinks containing 0.5 oz of alcohol per week     Comment: Socially; few times a month   • Drug use: Never   • Sexual activity: Yes     Partners: Male     Birth control/protection: Surgical         ALLERGIES  Bupropion and Adhesive tape        REVIEW OF SYSTEMS  Review of Systems   Constitutional: Negative for activity change, diaphoresis and fever.   HENT: Positive for congestion. Negative for facial swelling.    Respiratory: Positive for cough, shortness of breath and wheezing.    Cardiovascular: Negative for chest pain.   Gastrointestinal: Negative for abdominal pain, nausea and vomiting.   Skin: Negative for color change and rash.   Neurological: Positive for headaches. Negative for syncope.   All other systems reviewed and are negative.         PHYSICAL EXAM  ED Triage Vitals   Temp Pulse Resp BP SpO2   -- -- -- -- --      Temp src Heart Rate Source Patient Position BP Location FiO2 (%)   -- -- -- -- --         GENERAL: Pleasant lady, calm, no acute distress  HENT: Normocephalic and atraumatic, no facial swelling  EYES: no scleral icterus, EOMI, no conjunctival discharge  CV: regular rhythm, normal rate, no murmurs  RESPIRATORY: normal effort, but patient does have some faint wheezes bilaterally with occasional nonproductive coughing.  She is slightly splinting with her respiratory volume to avoid coughing.  MUSCULOSKELETAL: no deformity, no asymmetry  NEURO: alert, moves all extremities, follows commands  PSYCH:  calm, cooperative  SKIN: warm, dry    Vital signs and nursing notes reviewed.          LAB RESULTS  Recent Results (from the past 24 hour(s))   COVID-19 and FLU A/B PCR - Swab, Nasopharynx    Collection Time: 07/10/22  5:22 PM    Specimen: Nasopharynx; Swab   Result Value Ref Range    COVID19 Not Detected Not Detected - Ref. Range    Influenza A PCR Not Detected Not Detected    Influenza B PCR Not Detected Not Detected       Ordered the above labs and reviewed the  "results.        RADIOLOGY  XR Chest 1 View    Result Date: 7/10/2022  CR Chest 1 Vw INDICATION: Coughing congestion x3 weeks. COMPARISON:  None available. FINDINGS: Portable AP view(s) of the chest.  The heart and mediastinal contours are normal. The lungs are clear. No pneumothorax or pleural effusion.     No acute cardiopulmonary findings. Signer Name: SANDRA Bell MD  Signed: 7/10/2022 6:03 PM  Workstation Name: [facility]  [facility]      Ordered the above noted radiological studies. Reviewed by me in PACS.            PROCEDURES  Procedures        MEDICATIONS GIVEN IN ER  Medications   predniSONE (DELTASONE) tablet 60 mg (has no administration in time range)   azithromycin (ZITHROMAX) tablet 500 mg (has no administration in time range)                   MEDICAL DECISION MAKING, PROGRESS, and CONSULTS    All labs have been independently reviewed by me.  All radiology studies have been reviewed by me and discussed with radiologist dictating the report.   EKG's independently viewed and interpreted by me.  Discussion below represents my analysis of pertinent findings related to patient's condition, differential diagnosis, treatment plan and final disposition.  ED Course as of 07/10/22 1817   Sun Jul 10, 2022   1816 I reviewed the labs and x-ray from today's visit.  COVID is negative.  X-ray is reassuring.  Patient presents with features most consistent of prolonged asthmatic bronchitis issues.  I will start her on prednisone and azithromycin therapy while also having her continue her home nebulizer therapies as directed.  Encourage follow-up with her PCP this week.  Discharged home in good condition with usual \"return to ER\" instructions. [ELIAS]      ED Course User Index  [ELIAS] Mau Mcgowan MD            DIAGNOSIS  Final diagnoses:   Asthmatic bronchitis without complication, unspecified asthma severity, unspecified whether persistent         DISPOSITION  Home            Latest " Documented Vital Signs:  As of 18:17 EDT  BP- 137/77 HR- 100 Temp- 98.1 °F (36.7 °C) (Temporal) O2 sat- 96%        --    Please note that portions of this were completed with a voice recognition program.          Mau Mcgowan MD  07/10/22 [id number]

## 2022-07-18 ENCOUNTER — PRE-ADMISSION TESTING (OUTPATIENT)
Dept: PREADMISSION TESTING | Facility: HOSPITAL | Age: 47
End: 2022-07-18

## 2022-07-18 VITALS
SYSTOLIC BLOOD PRESSURE: 127 MMHG | TEMPERATURE: 97.1 F | DIASTOLIC BLOOD PRESSURE: 85 MMHG | BODY MASS INDEX: 45.86 KG/M2 | RESPIRATION RATE: 16 BRPM | HEART RATE: 68 BPM | WEIGHT: 292.2 LBS | HEIGHT: 67 IN | OXYGEN SATURATION: 97 %

## 2022-07-18 DIAGNOSIS — D05.11 DUCTAL CARCINOMA IN SITU (DCIS) OF RIGHT BREAST: ICD-10-CM

## 2022-07-18 LAB
ANION GAP SERPL CALCULATED.3IONS-SCNC: 9 MMOL/L (ref 5–15)
BUN SERPL-MCNC: 10 MG/DL (ref 6–20)
BUN/CREAT SERPL: 15.6 (ref 7–25)
CALCIUM SPEC-SCNC: 9.1 MG/DL (ref 8.6–10.5)
CHLORIDE SERPL-SCNC: 101 MMOL/L (ref 98–107)
CO2 SERPL-SCNC: 27 MMOL/L (ref 22–29)
CREAT SERPL-MCNC: 0.64 MG/DL (ref 0.57–1)
DEPRECATED RDW RBC AUTO: 40.1 FL (ref 37–54)
EGFRCR SERPLBLD CKD-EPI 2021: 110.5 ML/MIN/1.73
ERYTHROCYTE [DISTWIDTH] IN BLOOD BY AUTOMATED COUNT: 12.6 % (ref 12.3–15.4)
GLUCOSE SERPL-MCNC: 83 MG/DL (ref 65–99)
HCG SERPL QL: NEGATIVE
HCT VFR BLD AUTO: 40.9 % (ref 34–46.6)
HGB BLD-MCNC: 13.8 G/DL (ref 12–15.9)
MCH RBC QN AUTO: 29.5 PG (ref 26.6–33)
MCHC RBC AUTO-ENTMCNC: 33.7 G/DL (ref 31.5–35.7)
MCV RBC AUTO: 87.4 FL (ref 79–97)
PLATELET # BLD AUTO: 331 10*3/MM3 (ref 140–450)
PMV BLD AUTO: 11 FL (ref 6–12)
POTASSIUM SERPL-SCNC: 5 MMOL/L (ref 3.5–5.2)
QT INTERVAL: 390 MS
RBC # BLD AUTO: 4.68 10*6/MM3 (ref 3.77–5.28)
SARS-COV-2 ORF1AB RESP QL NAA+PROBE: NOT DETECTED
SODIUM SERPL-SCNC: 137 MMOL/L (ref 136–145)
WBC NRBC COR # BLD: 9.45 10*3/MM3 (ref 3.4–10.8)

## 2022-07-18 PROCEDURE — 36415 COLL VENOUS BLD VENIPUNCTURE: CPT

## 2022-07-18 PROCEDURE — 93005 ELECTROCARDIOGRAM TRACING: CPT

## 2022-07-18 PROCEDURE — 84703 CHORIONIC GONADOTROPIN ASSAY: CPT

## 2022-07-18 PROCEDURE — 93010 ELECTROCARDIOGRAM REPORT: CPT | Performed by: INTERNAL MEDICINE

## 2022-07-18 PROCEDURE — C9803 HOPD COVID-19 SPEC COLLECT: HCPCS

## 2022-07-18 PROCEDURE — U0004 COV-19 TEST NON-CDC HGH THRU: HCPCS

## 2022-07-18 PROCEDURE — 85027 COMPLETE CBC AUTOMATED: CPT

## 2022-07-18 PROCEDURE — 80048 BASIC METABOLIC PNL TOTAL CA: CPT

## 2022-07-18 RX ORDER — MULTIPLE VITAMINS W/ MINERALS TAB 9MG-400MCG
1 TAB ORAL DAILY
COMMUNITY

## 2022-07-18 RX ORDER — CHOLECALCIFEROL (VITAMIN D3) 25 MCG
1000 CAPSULE ORAL DAILY
COMMUNITY
End: 2023-01-31

## 2022-07-18 NOTE — DISCHARGE INSTRUCTIONS
Take the following medications the morning of surgery: METOPROLOL, ESCITAPOPRAM, BUPROPION, DUONEB AND LORAZEPAM AS NEEDED      If you are on prescription narcotic pain medication to control your pain you may also take that medication the morning of surgery.    General Instructions:  Do not eat solid food after midnight the night before surgery.  You may drink clear liquids day of surgery but must stop at least one hour before your hospital arrival time.  It is beneficial for you to have a clear drink that contains carbohydrates the day of surgery.  We suggest a 12 to 20 ounce bottle of Gatorade or Powerade for non-diabetic patients or a 12 to 20 ounce bottle of G2 or Powerade Zero for diabetic patients.     Clear liquids are liquids you can see through.  Nothing red in color.     Plain water                               Sports drinks  Sodas                                   Gelatin (Jell-O)  Fruit juices without pulp such as white grape juice and apple juice  Popsicles that contain no fruit or yogurt  Tea or coffee (no cream or milk added)  Gatorade / Powerade  G2 / Powerade Zero      Do not smoke, use chewing tobacco or drink alcohol the day of surgery.   Bring any papers given to you in the doctor’s office.  Wear clean comfortable clothes.  Do not wear contact lenses, false eyelashes or make-up.  Bring a case for your glasses.   Remove all piercings.  Leave jewelry and any other valuables at home.  The Pre-Admission Testing nurse will instruct you to bring medications if unable to obtain an accurate list in Pre-Admission Testing.            Preventing a Surgical Site Infection:  For 2 to 3 days before surgery, avoid shaving with a razor because the razor can irritate skin and make it easier to develop an infection.    Any areas of open skin can increase the risk of a post-operative wound infection by allowing bacteria to enter and travel throughout the body.  Notify your surgeon if you have any skin wounds /  rashes even if it is not near the expected surgical site.  The area will need assessed to determine if surgery should be delayed until it is healed.  The night prior to surgery shower using a fresh bar of anti-bacterial soap (such as Dial) and clean washcloth.  Sleep in a clean bed with clean clothing.  Do not allow pets to sleep with you.  Shower on the morning of surgery using a fresh bar of anti-bacterial soap (such as Dial) and clean washcloth.  Dry with a clean towel and dress in clean clothing.  Ask your surgeon if you will be receiving antibiotics prior to surgery.  Make sure you, your family, and all healthcare providers clean their hands with soap and water or an alcohol based hand  before caring for you or your wound.    Day of surgery:  Your arrival time is approximately two hours before your scheduled surgery time.  Upon arrival, a Pre-op nurse and Anesthesiologist will review your health history, obtain vital signs, and answer questions you may have.  The only belongings needed at this time will be a list of your home medications and if applicable your C-PAP/BI-PAP machine.  A Pre-op nurse will start an IV and you may receive medication in preparation for surgery, including something to help you relax.     Please be aware that surgery does come with discomfort.  We want to make every effort to control your discomfort so please discuss any uncontrolled symptoms with your nurse.   Your doctor will most likely have prescribed pain medications.      If you are going home after surgery you will receive individualized written care instructions before being discharged.  A responsible adult must drive you to and from the hospital on the day of your surgery and stay with you for 24 hours.  Discharge prescriptions can be filled by the hospital pharmacy during regular pharmacy hours.  If you are having surgery late in the day/evening your prescription may be e-prescribed to your pharmacy.  Please verify  your pharmacy hours or chose a 24 hour pharmacy to avoid not having access to your prescription because your pharmacy has closed for the day.    If you are staying overnight following surgery, you will be transported to your hospital room following the recovery period.  [facility] has all private rooms.        CHLORHEXIDINE CLOTH INSTRUCTIONS  The morning of surgery follow these instructions using the Chlorhexidine cloths you've been given.  These steps reduce bacteria on the body.  Do not use the cloths near your eyes, ears mouth, genitalia or on open wounds.  Throw the cloths away after use but do not try to flush them down a toilet.      Open and remove one cloth at a time from the package.    Leave the cloth unfolded and begin the bathing.  Massage the skin with the cloths using gentle pressure to remove bacteria.  Do not scrub harshly.   Follow the steps below with one 2% CHG cloth per area (6 total cloths).  One cloth for neck, shoulders and chest.  One cloth for both arms, hands, fingers and underarms (do underarms last).  One cloth for the abdomen followed by groin.  One cloth for right leg and foot including between the toes.  One cloth for left leg and foot including between the toes.  The last cloth is to be used for the back of the neck, back and buttocks.    Allow the CHG to air dry 3 minutes on the skin which will give it time to work and decrease the chance of irritation.  The skin may feel sticky until it is dry.  Do not rinse with water or any other liquid or you will lose the beneficial effects of the CHG.  If mild skin irritation occurs, do rinse the skin to remove the CHG.  Report this to the nurse at time of admission.  Do not apply lotions, creams, ointments, deodorants or perfumes after using the clothes. Dress in clean clothes before coming to the hospital.     If you have any questions please call Pre-Admission Testing at [phone].  Deductibles and co-payments are  collected on the day of service. Please be prepared to pay the required co-pay, deductible or deposit on the day of service as defined by your plan.      CHLORHEXIDINE CLOTH INSTRUCTIONS  The morning of surgery follow these instructions using the Chlorhexidine cloths you've been given.  These steps reduce bacteria on the body.  Do not use the cloths near your eyes, ears mouth, genitalia or on open wounds.  Throw the cloths away after use but do not try to flush them down a toilet.      Open and remove one cloth at a time from the package.    Leave the cloth unfolded and begin the bathing.  Massage the skin with the cloths using gentle pressure to remove bacteria.  Do not scrub harshly.   Follow the steps below with one 2% CHG cloth per area (6 total cloths).  One cloth for neck, shoulders and chest.  One cloth for both arms, hands, fingers and underarms (do underarms last).  One cloth for the abdomen followed by groin.  One cloth for right leg and foot including between the toes.  One cloth for left leg and foot including between the toes.  The last cloth is to be used for the back of the neck, back and buttocks.    Allow the CHG to air dry 3 minutes on the skin which will give it time to work and decrease the chance of irritation.  The skin may feel sticky until it is dry.  Do not rinse with water or any other liquid or you will lose the beneficial effects of the CHG.  If mild skin irritation occurs, do rinse the skin to remove the CHG.  Report this to the nurse at time of admission.  Do not apply lotions, creams, ointments, deodorants or perfumes after using the clothes. Dress in clean clothes before coming to the hospital.     Patient Education for Self-Quarantine Process    Following your COVID testing, we strongly recommend that you wear a mask when you are with other people and practice social distancing.   Limit your activities to only required outings.  Wash your hands with soap and water frequently for at  least 20 seconds.   Avoid touching your eyes, nose and mouth with unwashed hands.  Do not share anything - utensils, drinking glasses, food from the same bowl.   Sanitize household surfaces daily. Include all high touch areas (door handles, light switches, phones, countertops, etc.)    Call your surgeon immediately if you experience any of the following symptoms:  Sore Throat  Shortness of Breath or difficulty breathing  Cough  Chills  Body soreness or muscle pain  Headache  Fever  New loss of taste or smell  Do not arrive for your surgery ill.  Your procedure will need to be rescheduled to another time.  You will need to call your physician before the day of surgery to avoid any unnecessary exposure to hospital staff as well as other patients.   A

## 2022-07-19 DIAGNOSIS — D05.11 DUCTAL CARCINOMA IN SITU (DCIS) OF RIGHT BREAST: ICD-10-CM

## 2022-07-19 RX ORDER — LORAZEPAM 1 MG/1
TABLET ORAL
Qty: 20 TABLET | OUTPATIENT
Start: 2022-07-19

## 2022-07-20 ENCOUNTER — ANESTHESIA (OUTPATIENT)
Dept: PERIOP | Facility: HOSPITAL | Age: 47
End: 2022-07-20

## 2022-07-20 ENCOUNTER — ANESTHESIA EVENT (OUTPATIENT)
Dept: PERIOP | Facility: HOSPITAL | Age: 47
End: 2022-07-20

## 2022-07-20 ENCOUNTER — HOSPITAL ENCOUNTER (OUTPATIENT)
Facility: HOSPITAL | Age: 47
Setting detail: HOSPITAL OUTPATIENT SURGERY
Discharge: HOME | End: 2022-07-20
Attending: STUDENT IN AN ORGANIZED HEALTH CARE EDUCATION/TRAINING PROGRAM | Admitting: STUDENT IN AN ORGANIZED HEALTH CARE EDUCATION/TRAINING PROGRAM

## 2022-07-20 ENCOUNTER — HOSPITAL ENCOUNTER (OUTPATIENT)
Dept: MAMMOGRAPHY | Facility: HOSPITAL | Age: 47
Discharge: HOME | End: 2022-07-20

## 2022-07-20 ENCOUNTER — APPOINTMENT (OUTPATIENT)
Dept: GENERAL RADIOLOGY | Facility: HOSPITAL | Age: 47
End: 2022-07-20

## 2022-07-20 VITALS
RESPIRATION RATE: 16 BRPM | OXYGEN SATURATION: 91 % | SYSTOLIC BLOOD PRESSURE: 130 MMHG | BODY MASS INDEX: 45.99 KG/M2 | HEART RATE: 93 BPM | DIASTOLIC BLOOD PRESSURE: 68 MMHG | HEIGHT: 67 IN | TEMPERATURE: 98.8 F | WEIGHT: 293 LBS

## 2022-07-20 DIAGNOSIS — D05.11 DUCTAL CARCINOMA IN SITU (DCIS) OF RIGHT BREAST: ICD-10-CM

## 2022-07-20 PROCEDURE — 25010000002 DEXAMETHASONE PER 1 MG: Performed by: NURSE ANESTHETIST, CERTIFIED REGISTERED

## 2022-07-20 PROCEDURE — 25010000002 NEOSTIGMINE 5 MG/10ML SOLUTION: Performed by: NURSE ANESTHETIST, CERTIFIED REGISTERED

## 2022-07-20 PROCEDURE — 25010000002 FENTANYL CITRATE (PF) 50 MCG/ML SOLUTION: Performed by: NURSE ANESTHETIST, CERTIFIED REGISTERED

## 2022-07-20 PROCEDURE — 25010000002 GENTAMICIN PER 80 MG: Performed by: PLASTIC SURGERY

## 2022-07-20 PROCEDURE — 76098 X-RAY EXAM SURGICAL SPECIMEN: CPT

## 2022-07-20 PROCEDURE — C1819 TISSUE LOCALIZATION-EXCISION: HCPCS

## 2022-07-20 PROCEDURE — 25010000002 ONDANSETRON PER 1 MG: Performed by: NURSE ANESTHETIST, CERTIFIED REGISTERED

## 2022-07-20 PROCEDURE — 25010000002 HYDROMORPHONE 1 MG/ML SOLUTION: Performed by: NURSE ANESTHETIST, CERTIFIED REGISTERED

## 2022-07-20 PROCEDURE — 94664 DEMO&/EVAL PT USE INHALER: CPT

## 2022-07-20 PROCEDURE — 88307 TISSUE EXAM BY PATHOLOGIST: CPT | Performed by: STUDENT IN AN ORGANIZED HEALTH CARE EDUCATION/TRAINING PROGRAM

## 2022-07-20 PROCEDURE — 19301 PARTIAL MASTECTOMY: CPT | Performed by: STUDENT IN AN ORGANIZED HEALTH CARE EDUCATION/TRAINING PROGRAM

## 2022-07-20 PROCEDURE — 0 LIDOCAINE 1 % SOLUTION: Performed by: STUDENT IN AN ORGANIZED HEALTH CARE EDUCATION/TRAINING PROGRAM

## 2022-07-20 PROCEDURE — 25010000002 PROPOFOL 10 MG/ML EMULSION: Performed by: NURSE ANESTHETIST, CERTIFIED REGISTERED

## 2022-07-20 PROCEDURE — 88305 TISSUE EXAM BY PATHOLOGIST: CPT | Performed by: PLASTIC SURGERY

## 2022-07-20 PROCEDURE — 94799 UNLISTED PULMONARY SVC/PX: CPT

## 2022-07-20 PROCEDURE — 25010000002 MIDAZOLAM PER 1 MG: Performed by: ANESTHESIOLOGY

## 2022-07-20 PROCEDURE — 25010000002 HYDROMORPHONE PER 4 MG: Performed by: NURSE ANESTHETIST, CERTIFIED REGISTERED

## 2022-07-20 PROCEDURE — 19301 PARTIAL MASTECTOMY: CPT | Performed by: PHYSICIAN ASSISTANT

## 2022-07-20 PROCEDURE — 94640 AIRWAY INHALATION TREATMENT: CPT

## 2022-07-20 PROCEDURE — 25010000002 CEFAZOLIN IN DEXTROSE 2-4 GM/100ML-% SOLUTION: Performed by: STUDENT IN AN ORGANIZED HEALTH CARE EDUCATION/TRAINING PROGRAM

## 2022-07-20 DEVICE — LIGACLIP MCA MULTIPLE CLIP APPLIERS, 20 MEDIUM CLIPS
Type: IMPLANTABLE DEVICE | Site: BREAST | Status: FUNCTIONAL
Brand: LIGACLIP

## 2022-07-20 DEVICE — KNOTLESS TISSUE CONTROL DEVICE, UNDYED UNIDIRECTIONAL (ANTIBACTERIAL) SYNTHETIC ABSORBABLE DEVICE
Type: IMPLANTABLE DEVICE | Site: BREAST | Status: FUNCTIONAL
Brand: STRATAFIX

## 2022-07-20 RX ORDER — FLUMAZENIL 0.1 MG/ML
0.2 INJECTION INTRAVENOUS AS NEEDED
Status: DISCONTINUED | OUTPATIENT
Start: 2022-07-20 | End: 2022-07-20 | Stop reason: HOSPADM

## 2022-07-20 RX ORDER — FAMOTIDINE 10 MG/ML
20 INJECTION, SOLUTION INTRAVENOUS ONCE
Status: COMPLETED | OUTPATIENT
Start: 2022-07-20 | End: 2022-07-20

## 2022-07-20 RX ORDER — DIPHENHYDRAMINE HYDROCHLORIDE 50 MG/ML
12.5 INJECTION INTRAMUSCULAR; INTRAVENOUS
Status: DISCONTINUED | OUTPATIENT
Start: 2022-07-20 | End: 2022-07-20 | Stop reason: HOSPADM

## 2022-07-20 RX ORDER — OXYCODONE AND ACETAMINOPHEN 7.5; 325 MG/1; MG/1
1 TABLET ORAL EVERY 4 HOURS PRN
Status: DISCONTINUED | OUTPATIENT
Start: 2022-07-20 | End: 2022-07-20 | Stop reason: HOSPADM

## 2022-07-20 RX ORDER — LIDOCAINE HYDROCHLORIDE 10 MG/ML
3 INJECTION, SOLUTION INFILTRATION; PERINEURAL ONCE
Status: COMPLETED | OUTPATIENT
Start: 2022-07-20 | End: 2022-07-20

## 2022-07-20 RX ORDER — HYDROCODONE BITARTRATE AND ACETAMINOPHEN 5; 325 MG/1; MG/1
TABLET ORAL
COMMUNITY
Start: 2022-07-08 | End: 2022-08-08

## 2022-07-20 RX ORDER — PROMETHAZINE HYDROCHLORIDE 25 MG/1
25 TABLET ORAL ONCE AS NEEDED
Status: DISCONTINUED | OUTPATIENT
Start: 2022-07-20 | End: 2022-07-20 | Stop reason: HOSPADM

## 2022-07-20 RX ORDER — HYDROCODONE BITARTRATE AND ACETAMINOPHEN 7.5; 325 MG/1; MG/1
1 TABLET ORAL ONCE AS NEEDED
Status: COMPLETED | OUTPATIENT
Start: 2022-07-20 | End: 2022-07-20

## 2022-07-20 RX ORDER — SODIUM CHLORIDE 0.9 % (FLUSH) 0.9 %
3-10 SYRINGE (ML) INJECTION AS NEEDED
Status: DISCONTINUED | OUTPATIENT
Start: 2022-07-20 | End: 2022-07-20 | Stop reason: HOSPADM

## 2022-07-20 RX ORDER — ONDANSETRON 2 MG/ML
4 INJECTION INTRAMUSCULAR; INTRAVENOUS ONCE AS NEEDED
Status: COMPLETED | OUTPATIENT
Start: 2022-07-20 | End: 2022-07-20

## 2022-07-20 RX ORDER — IPRATROPIUM BROMIDE AND ALBUTEROL SULFATE 2.5; .5 MG/3ML; MG/3ML
3 SOLUTION RESPIRATORY (INHALATION) ONCE AS NEEDED
Status: DISCONTINUED | OUTPATIENT
Start: 2022-07-20 | End: 2022-07-20 | Stop reason: HOSPADM

## 2022-07-20 RX ORDER — BUPIVACAINE HYDROCHLORIDE 2.5 MG/ML
INJECTION, SOLUTION EPIDURAL; INFILTRATION; INTRACAUDAL AS NEEDED
Status: DISCONTINUED | OUTPATIENT
Start: 2022-07-20 | End: 2022-07-20 | Stop reason: HOSPADM

## 2022-07-20 RX ORDER — PROPOFOL 10 MG/ML
VIAL (ML) INTRAVENOUS AS NEEDED
Status: DISCONTINUED | OUTPATIENT
Start: 2022-07-20 | End: 2022-07-20 | Stop reason: SURG

## 2022-07-20 RX ORDER — MIDAZOLAM HYDROCHLORIDE 1 MG/ML
1 INJECTION INTRAMUSCULAR; INTRAVENOUS
Status: COMPLETED | OUTPATIENT
Start: 2022-07-20 | End: 2022-07-20

## 2022-07-20 RX ORDER — GLYCOPYRROLATE 0.2 MG/ML
INJECTION INTRAMUSCULAR; INTRAVENOUS AS NEEDED
Status: DISCONTINUED | OUTPATIENT
Start: 2022-07-20 | End: 2022-07-20 | Stop reason: SURG

## 2022-07-20 RX ORDER — MAGNESIUM HYDROXIDE 1200 MG/15ML
LIQUID ORAL AS NEEDED
Status: DISCONTINUED | OUTPATIENT
Start: 2022-07-20 | End: 2022-07-20 | Stop reason: HOSPADM

## 2022-07-20 RX ORDER — NALOXONE HCL 0.4 MG/ML
0.2 VIAL (ML) INJECTION AS NEEDED
Status: DISCONTINUED | OUTPATIENT
Start: 2022-07-20 | End: 2022-07-20 | Stop reason: HOSPADM

## 2022-07-20 RX ORDER — FENTANYL CITRATE 50 UG/ML
50 INJECTION, SOLUTION INTRAMUSCULAR; INTRAVENOUS
Status: DISCONTINUED | OUTPATIENT
Start: 2022-07-20 | End: 2022-07-20 | Stop reason: HOSPADM

## 2022-07-20 RX ORDER — FLUTICASONE PROPIONATE 50 MCG
2 SPRAY, SUSPENSION (ML) NASAL DAILY
COMMUNITY
End: 2022-09-08

## 2022-07-20 RX ORDER — DIAZEPAM 5 MG/1
5 TABLET ORAL ONCE
Status: DISCONTINUED | OUTPATIENT
Start: 2022-07-20 | End: 2022-07-20 | Stop reason: HOSPADM

## 2022-07-20 RX ORDER — SODIUM CHLORIDE, SODIUM LACTATE, POTASSIUM CHLORIDE, CALCIUM CHLORIDE 600; 310; 30; 20 MG/100ML; MG/100ML; MG/100ML; MG/100ML
100 INJECTION, SOLUTION INTRAVENOUS CONTINUOUS
Status: DISCONTINUED | OUTPATIENT
Start: 2022-07-20 | End: 2022-07-20 | Stop reason: HOSPADM

## 2022-07-20 RX ORDER — BUPIVACAINE HYDROCHLORIDE AND EPINEPHRINE 5; 5 MG/ML; UG/ML
INJECTION, SOLUTION PERINEURAL AS NEEDED
Status: DISCONTINUED | OUTPATIENT
Start: 2022-07-20 | End: 2022-07-20 | Stop reason: HOSPADM

## 2022-07-20 RX ORDER — ROCURONIUM BROMIDE 10 MG/ML
INJECTION, SOLUTION INTRAVENOUS AS NEEDED
Status: DISCONTINUED | OUTPATIENT
Start: 2022-07-20 | End: 2022-07-20 | Stop reason: SURG

## 2022-07-20 RX ORDER — HYDROCODONE BITARTRATE AND ACETAMINOPHEN 5; 325 MG/1; MG/1
1 TABLET ORAL ONCE AS NEEDED
Status: DISCONTINUED | OUTPATIENT
Start: 2022-07-20 | End: 2022-07-20 | Stop reason: HOSPADM

## 2022-07-20 RX ORDER — IPRATROPIUM BROMIDE AND ALBUTEROL SULFATE 2.5; .5 MG/3ML; MG/3ML
3 SOLUTION RESPIRATORY (INHALATION) ONCE
Status: COMPLETED | OUTPATIENT
Start: 2022-07-20 | End: 2022-07-20

## 2022-07-20 RX ORDER — DIAZEPAM 5 MG/1
5 TABLET ORAL ONCE
Status: COMPLETED | OUTPATIENT
Start: 2022-07-20 | End: 2022-07-20

## 2022-07-20 RX ORDER — SODIUM CHLORIDE, SODIUM LACTATE, POTASSIUM CHLORIDE, CALCIUM CHLORIDE 600; 310; 30; 20 MG/100ML; MG/100ML; MG/100ML; MG/100ML
9 INJECTION, SOLUTION INTRAVENOUS CONTINUOUS
Status: DISCONTINUED | OUTPATIENT
Start: 2022-07-20 | End: 2022-07-20 | Stop reason: HOSPADM

## 2022-07-20 RX ORDER — NEOSTIGMINE METHYLSULFATE 0.5 MG/ML
INJECTION, SOLUTION INTRAVENOUS AS NEEDED
Status: DISCONTINUED | OUTPATIENT
Start: 2022-07-20 | End: 2022-07-20 | Stop reason: SURG

## 2022-07-20 RX ORDER — CEFAZOLIN SODIUM 2 G/100ML
2 INJECTION, SOLUTION INTRAVENOUS ONCE
Status: COMPLETED | OUTPATIENT
Start: 2022-07-20 | End: 2022-07-20

## 2022-07-20 RX ORDER — LABETALOL HYDROCHLORIDE 5 MG/ML
5 INJECTION, SOLUTION INTRAVENOUS
Status: DISCONTINUED | OUTPATIENT
Start: 2022-07-20 | End: 2022-07-20 | Stop reason: HOSPADM

## 2022-07-20 RX ORDER — KETAMINE HYDROCHLORIDE 10 MG/ML
INJECTION INTRAMUSCULAR; INTRAVENOUS AS NEEDED
Status: DISCONTINUED | OUTPATIENT
Start: 2022-07-20 | End: 2022-07-20 | Stop reason: SURG

## 2022-07-20 RX ORDER — SODIUM CHLORIDE 0.9 % (FLUSH) 0.9 %
3 SYRINGE (ML) INJECTION EVERY 12 HOURS SCHEDULED
Status: DISCONTINUED | OUTPATIENT
Start: 2022-07-20 | End: 2022-07-20 | Stop reason: HOSPADM

## 2022-07-20 RX ORDER — IBUPROFEN 600 MG/1
600 TABLET ORAL ONCE AS NEEDED
Status: DISCONTINUED | OUTPATIENT
Start: 2022-07-20 | End: 2022-07-20 | Stop reason: HOSPADM

## 2022-07-20 RX ORDER — LIDOCAINE HYDROCHLORIDE 20 MG/ML
INJECTION, SOLUTION INFILTRATION; PERINEURAL AS NEEDED
Status: DISCONTINUED | OUTPATIENT
Start: 2022-07-20 | End: 2022-07-20 | Stop reason: SURG

## 2022-07-20 RX ORDER — CEPHALEXIN 500 MG/1
CAPSULE ORAL
COMMUNITY
Start: 2022-07-08 | End: 2022-08-08

## 2022-07-20 RX ORDER — PROMETHAZINE HYDROCHLORIDE 25 MG/1
25 SUPPOSITORY RECTAL ONCE AS NEEDED
Status: DISCONTINUED | OUTPATIENT
Start: 2022-07-20 | End: 2022-07-20 | Stop reason: HOSPADM

## 2022-07-20 RX ORDER — HYDRALAZINE HYDROCHLORIDE 20 MG/ML
5 INJECTION INTRAMUSCULAR; INTRAVENOUS
Status: DISCONTINUED | OUTPATIENT
Start: 2022-07-20 | End: 2022-07-20 | Stop reason: HOSPADM

## 2022-07-20 RX ORDER — HYDROMORPHONE HYDROCHLORIDE 1 MG/ML
0.5 INJECTION, SOLUTION INTRAMUSCULAR; INTRAVENOUS; SUBCUTANEOUS
Status: DISCONTINUED | OUTPATIENT
Start: 2022-07-20 | End: 2022-07-20 | Stop reason: HOSPADM

## 2022-07-20 RX ORDER — PROMETHAZINE HYDROCHLORIDE 25 MG/1
TABLET ORAL
COMMUNITY
Start: 2022-07-08 | End: 2022-09-08

## 2022-07-20 RX ORDER — LIDOCAINE HYDROCHLORIDE 10 MG/ML
0.5 INJECTION, SOLUTION EPIDURAL; INFILTRATION; INTRACAUDAL; PERINEURAL ONCE AS NEEDED
Status: DISCONTINUED | OUTPATIENT
Start: 2022-07-20 | End: 2022-07-20 | Stop reason: HOSPADM

## 2022-07-20 RX ORDER — CEFAZOLIN SODIUM 2 G/100ML
2 INJECTION, SOLUTION INTRAVENOUS ONCE
Status: DISCONTINUED | OUTPATIENT
Start: 2022-07-20 | End: 2022-07-20 | Stop reason: HOSPADM

## 2022-07-20 RX ORDER — EPHEDRINE SULFATE 50 MG/ML
5 INJECTION, SOLUTION INTRAVENOUS ONCE AS NEEDED
Status: DISCONTINUED | OUTPATIENT
Start: 2022-07-20 | End: 2022-07-20 | Stop reason: HOSPADM

## 2022-07-20 RX ORDER — DEXAMETHASONE SODIUM PHOSPHATE 10 MG/ML
INJECTION INTRAMUSCULAR; INTRAVENOUS AS NEEDED
Status: DISCONTINUED | OUTPATIENT
Start: 2022-07-20 | End: 2022-07-20 | Stop reason: SURG

## 2022-07-20 RX ORDER — DIPHENHYDRAMINE HCL 25 MG
25 CAPSULE ORAL
Status: DISCONTINUED | OUTPATIENT
Start: 2022-07-20 | End: 2022-07-20 | Stop reason: HOSPADM

## 2022-07-20 RX ORDER — FENTANYL CITRATE 50 UG/ML
INJECTION, SOLUTION INTRAMUSCULAR; INTRAVENOUS AS NEEDED
Status: DISCONTINUED | OUTPATIENT
Start: 2022-07-20 | End: 2022-07-20 | Stop reason: SURG

## 2022-07-20 RX ADMIN — FENTANYL CITRATE 25 MCG: 50 INJECTION INTRAMUSCULAR; INTRAVENOUS at 10:10

## 2022-07-20 RX ADMIN — FENTANYL CITRATE 25 MCG: 50 INJECTION INTRAMUSCULAR; INTRAVENOUS at 10:06

## 2022-07-20 RX ADMIN — ROCURONIUM BROMIDE 60 MG: 50 INJECTION INTRAVENOUS at 09:37

## 2022-07-20 RX ADMIN — FENTANYL CITRATE 50 MCG: 50 INJECTION INTRAMUSCULAR; INTRAVENOUS at 10:15

## 2022-07-20 RX ADMIN — MIDAZOLAM 1 MG: 1 INJECTION INTRAMUSCULAR; INTRAVENOUS at 09:07

## 2022-07-20 RX ADMIN — LIDOCAINE HYDROCHLORIDE 100 MG: 20 INJECTION, SOLUTION INFILTRATION; PERINEURAL at 09:37

## 2022-07-20 RX ADMIN — MIDAZOLAM 1 MG: 1 INJECTION INTRAMUSCULAR; INTRAVENOUS at 08:56

## 2022-07-20 RX ADMIN — PROPOFOL 250 MG: 10 INJECTION, EMULSION INTRAVENOUS at 09:37

## 2022-07-20 RX ADMIN — HYDROMORPHONE HYDROCHLORIDE 0.5 MG: 1 INJECTION, SOLUTION INTRAMUSCULAR; INTRAVENOUS; SUBCUTANEOUS at 13:20

## 2022-07-20 RX ADMIN — NEOSTIGMINE METHYLSULFATE 3 MG: 0.5 INJECTION INTRAVENOUS at 12:35

## 2022-07-20 RX ADMIN — FENTANYL CITRATE 100 MCG: 50 INJECTION INTRAMUSCULAR; INTRAVENOUS at 09:37

## 2022-07-20 RX ADMIN — SODIUM CHLORIDE, POTASSIUM CHLORIDE, SODIUM LACTATE AND CALCIUM CHLORIDE 9 ML/HR: 600; 310; 30; 20 INJECTION, SOLUTION INTRAVENOUS at 07:28

## 2022-07-20 RX ADMIN — GLYCOPYRROLATE 0.4 MG: 0.2 INJECTION INTRAMUSCULAR; INTRAVENOUS at 12:35

## 2022-07-20 RX ADMIN — IPRATROPIUM BROMIDE AND ALBUTEROL SULFATE 3 ML: .5; 3 SOLUTION RESPIRATORY (INHALATION) at 07:23

## 2022-07-20 RX ADMIN — PROPOFOL 25 MCG/KG/MIN: 10 INJECTION, EMULSION INTRAVENOUS at 09:42

## 2022-07-20 RX ADMIN — ONDANSETRON 4 MG: 2 INJECTION INTRAMUSCULAR; INTRAVENOUS at 13:14

## 2022-07-20 RX ADMIN — PROPOFOL 50 MG: 10 INJECTION, EMULSION INTRAVENOUS at 09:38

## 2022-07-20 RX ADMIN — HYDROMORPHONE HYDROCHLORIDE 0.5 MG: 1 INJECTION, SOLUTION INTRAMUSCULAR; INTRAVENOUS; SUBCUTANEOUS at 11:26

## 2022-07-20 RX ADMIN — KETAMINE HYDROCHLORIDE 50 MG: 10 INJECTION INTRAMUSCULAR; INTRAVENOUS at 09:42

## 2022-07-20 RX ADMIN — Medication 3 ML: at 08:10

## 2022-07-20 RX ADMIN — ROCURONIUM BROMIDE 10 MG: 50 INJECTION INTRAVENOUS at 11:21

## 2022-07-20 RX ADMIN — SODIUM CHLORIDE, POTASSIUM CHLORIDE, SODIUM LACTATE AND CALCIUM CHLORIDE: 600; 310; 30; 20 INJECTION, SOLUTION INTRAVENOUS at 12:34

## 2022-07-20 RX ADMIN — HYDROCODONE BITARTRATE AND ACETAMINOPHEN 1 TABLET: 7.5; 325 TABLET ORAL at 13:37

## 2022-07-20 RX ADMIN — FENTANYL CITRATE 50 MCG: 50 INJECTION INTRAMUSCULAR; INTRAVENOUS at 13:14

## 2022-07-20 RX ADMIN — CEFAZOLIN SODIUM 2 G: 2 INJECTION, SOLUTION INTRAVENOUS at 09:22

## 2022-07-20 RX ADMIN — DEXAMETHASONE SODIUM PHOSPHATE 12 MG: 10 INJECTION INTRAMUSCULAR; INTRAVENOUS at 09:44

## 2022-07-20 RX ADMIN — HYDROMORPHONE HYDROCHLORIDE 0.5 MG: 1 INJECTION, SOLUTION INTRAMUSCULAR; INTRAVENOUS; SUBCUTANEOUS at 13:36

## 2022-07-20 RX ADMIN — FENTANYL CITRATE 50 MCG: 50 INJECTION INTRAMUSCULAR; INTRAVENOUS at 14:27

## 2022-07-20 RX ADMIN — DIAZEPAM 5 MG: 5 TABLET ORAL at 06:58

## 2022-07-20 RX ADMIN — FAMOTIDINE 20 MG: 10 INJECTION INTRAVENOUS at 07:28

## 2022-07-20 NOTE — H&P
Update: No changes to H&P. Plan for right breast wire localized lumpectomy with oncoplastic closure by Dr. Bhakta.     General Surgery Breast Cancer History and Physical Exam      Summary:    Radha Torre is a 46 y.o. lady. 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       A multidisciplinary plan has been formulated for the patient:    (1) Breast Surgical Oncology:  -Follow up [employer] 9 panel genetic testing. I will call her with results.   -Nurse navigator consult.   -Surgical plan: Plan for right breast wire localized lumpectomy.  All risk, benefits, and alternatives were explained to the patient who agreed and wished to proceed.  -Plastic surgery referral deferred.     (2) Medical Oncology:  -Appointment with Dr. Sharma 6/2/2022.      (3) Radiation Oncology:  -Will refer postoperatively for evaluation for radiation therapy.     (4) Other:   -Due for screening colonoscopy. Will discuss postoperatively.      Referring Provider: ALEXIS Toney     Chief Complaint: abnormal breast imaging     History of Present Illness: Ms. Radha Torre is a 46 y.o. year old lady, seen at the request of Sofia Mora APRN for a new diagnosis of right breast cancer.       The patient presents to the clinic to discuss her yearly mammogram. She reports that she generally has yearly mammograms, but due to COVID-19 her last one was a little late.The patient denies every having a breast biopsy. Her work-up is detailed in the oncologic history below.      She had a precancerous mole on her breast and was seen by dermatology for this. The patient denies any lumps, bumps, skin changes, and nipple discharge.She reports a family history in her mother of breast cancer. Denies any family history of ovarian cancer.      She has not had a colonoscopy before.      Workup of Current Diagnosis:    3/7/2022 Bilateral Screening Mammogram:  Indeterminate left  breast asymmetry. Recommend further evaluation with CC spot compression and lateral views with Tomosynthesis and possible ultrasound. Indeterminate right breast calcifications. Recommend further evaluation  with CC and lateral spot magnification views.  BI-RADS Category 0: Incomplete      4/18/2022 Bilateral Breast Diagnostic Mammogram:   With additional imaging of the left breast, the area of focal asymmetry in the middle third slightly medial to the plane of the nipple effaces and has an appearance consistent with normal breast parenchyma.  Additional mammographic imaging of the right breast demonstrates presence of a cluster of microcalcifications in the middle third lower-inner quadrant of the right breast. The calcifications do not  demonstrate dependent layering on 90 degree lateral imaging.    IMPRESSION:  1. There is a cluster of microcalcifications in the middle third lower-inner quadrant of the right breast. Correlation with a stereotactic guided right breast biopsy is recommended.  2. There are no findings suspicious for malignancy in the left breast.  BI-RADS Category 0: Incomplete. Needs additional imaging evaluation.     5/13/2022 Right Breast Stereotactic Biopsy:   Tomosynthesis guided targeting on the calcifications in the lower inner quadrant of the right breast  was performed. Multiple tissue specimens were obtained. A specimen radiograph was obtained demonstrating microcalcifications within the specimen. A tri bell shaped metallic clip was placed to lola the site. Postbiopsy mammography of the right breast demonstrates placement of a metallic clip at the 4 o'clock position without evidence for clip migration or for a postbiopsyhematoma.  The pathology result has returned as DCIS. This is concordant with imaging findings.  IMPRESSION:  Technically successful Tomosynthesis guided Mammotome vacuum assisted right breast biopsy with placement of a tri bell shaped metallic clip. The pathology result has  returned as DCIS. Surgical consultation and excision are recommended.  BI-RADS Category 6: Known malignancy.     2022 Pathology:   Final Diagnosis   1. Breast, Right, 4:00 o'clock Biopsy:  Ductal carcinoma in-situ with               A. The ductal carcinoma in-situ is of intermediate nuclear grade with cribriform and micropapillary architecture and no necrosis.                 B. The ductal carcinoma in situ measures up to 7 mm on the slide.                 C. Microcalcifications are seen associated with the ductal carcinoma in situ       2022 Bilateral Breast MRI:   IMPRESSION AND RECOMMENDATION:    1.  A 2.1 cm biopsy cavity with two 1 cm focal areas of enhancement along the cavity margins represents the site of biopsy-proven malignancy. Surgical management is recommended with preoperative localization targeting the biopsy clip and residual calcifications, which measure up to at least 1.4 cm on the post biopsy mammogram.   2.  No MRI evidence of malignancy in the left breast.   BI-RADS Category 6: Known biopsy-proven malignancy     Gynecologic History:   G:3. P:3 AB:0  Age at first childbirth: 28-years-old  Lactation/How long: The patient did breast feed her three children.  Age at menarche: 14-years-old  Age at menopause: NA  Total years of oral contraceptive use: Has used oral contraception in the past.   Total years of hormone replacement therapy: None     Past Medical History:   She reports that she had palpitations as a side effect bupropion and now is on a beta-blocker.     Past Surgical History:    ·  x3  · Endometrial ablation  · Skin lesion excisions  · Tubal ligation  · [place] tooth extraction     Family History:    · As above     Social History:  · Denies tobacco use  · Occasional alcohol use     Allergies:         Allergies   Allergen Reactions   • Bupropion Palpitations   • Adhesive Tape Rash       Localized if left on for extended periods of time         Medications:      Current  Outpatient Medications:   •  albuterol (PROVENTIL) (2.5 MG/3ML) 0.083% nebulizer solution, USE ONE VIAL VIA NEBULIZATION BY MOUTH EVERY 6 HOURS AS NEEDED FOR WHEEZING, Disp: 75 mL, Rfl: 0  •  albuterol sulfate  (90 Base) MCG/ACT inhaler, Inhale 2 puffs Every 4 (Four) Hours As Needed for Wheezing or Shortness of Air., Disp: 1 inhaler, Rfl: 3  •  ASPIRIN 81 PO, Take 81 mg by mouth Daily. Not taking due to biopsy, told to hold for 1 week, Disp: , Rfl:   •  cetirizine (zyrTEC) 10 MG tablet, Take 10 mg by mouth Daily., Disp: , Rfl:   •  escitalopram (LEXAPRO) 20 MG tablet, Take 1 tablet by mouth Daily., Disp: 90 tablet, Rfl: 0  •  hydrOXYzine (ATARAX) 25 MG tablet, TAKE ONE TABLET BY MOUTH ONCE NIGHTLY AS NEEDED FOR ANXIETY, Disp: 30 tablet, Rfl: 3  •  ipratropium (ATROVENT) 0.02 % nebulizer solution, INHALE 2.5 ML(S) EVERY 4 HOURS AS NEEDED FOR WHEEZING OR SHORTNESS OF AIR, Disp: 62.5 mL, Rfl: 0  •  losartan (COZAAR) 50 MG tablet, Take 50 mg by mouth Daily., Disp: , Rfl:   •  metoprolol tartrate (LOPRESSOR) 50 MG tablet, Take 50 mg by mouth 2 (Two) Times a Day., Disp: , Rfl:   •  pravastatin (Pravachol) 20 MG tablet, Take 1 tablet by mouth Daily., Disp: 30 tablet, Rfl: 3     Laboratory Values:    Labs from 1/10/2022 reviewed     Review of Systems:   Influenza-like illness: no fever, no  cough, no  sore throat, no  body aches, no loss of sense of taste or smell, no known exposure to person with Covid-19.  Constitutional: Negative for fevers or chills  HENT: Negative for hearing loss or runny nose  Eyes: Negative for vision changes or scleral icterus  Respiratory: Negative for cough or shortness of breath  Cardiovascular: Negative for chest pain or heart palpitations  Gastrointestinal: Negative for abdominal pain, nausea, vomiting, constipation, melena, or hematochezia  Genitourinary: Negative for hematuria or dysuria  Musculoskeletal: Negative for joint swelling or gait instability  Neurologic: Negative for  tremors or seizures  Psychiatric: Negative for suicidal ideations or depression  All other systems reviewed and negative     Physical Exam:   · ECO - Asymptomatic  · Constitutional: Well-developed well-nourished, no acute distress  · Eyes: Conjunctiva normal, sclera nonicteric  · ENMT: Hearing grossly normal, oral mucosa moist  · Neck: Supple, no palpable mass, trachea midline  · Respiratory: Clear to auscultation, normal inspiratory effort  · Cardiovascular: Regular rate, no peripheral edema, no jugular venous distention  · Breast: symmetric  ? Right: No visible abnormalities on inspection while seated, with arms raised or hands on hips. No masses, skin changes, or nipple abnormalities.  ? Left: No visible abnormalities on inspection while seated, with arms raised or hands on hips. No masses, skin changes, or nipple abnormalities.  ? Biopsy site appreciated in right breast, otherwise no skin changes.   ? No clinical chest wall involvement.  · Gastrointestinal: Soft, nontender  · Lymphatics (palpable nodes): No cervical, supraclavicular or axillary lymphadenopathy  · Skin:  Warm, dry, no rash on visualized skin surfaces  · Musculoskeletal: Symmetric strength, normal gait  · Psychiatric: Alert and oriented ×3, normal affect      Discussion:  I had an extensive discussion with the patient and her family about the nature of her breast cancer diagnosis. We reviewed the components of breast tissue including ducts and lobules. We reviewed her pathology report in detail. We reviewed breast cancer histology, including stage, grade, ER/NE receptors, HER2 receptors and how this applies to her diagnosis. We reviewed the basics of systemic and local/regional management of breast cancer.      We discussed that most breast cancer is not hereditary, however given her age and family history, this may play a role in her case. I believe genetic testing is warranted and could affect surgical decision making.      We reviewed  potential surgical treatments to include partial mastectomy, mastectomy, sentinel lymph node biopsy and axillary node dissection and discussed the rationale associated with each approach. Regarding radiation therapy, we discussed that radiation is indicated in all cases of breast conservation and in only limited circumstances following mastectomy. We discussed that the primary goal of adjuvant radiation is to decrease the likelihood of local recurrence.      In her case, she is a good candidate for lumpectomy and she would like to proceed with breast conservation. We discussed that lumpectomy would require preoperative wire-localization. We also discussed the risk of positive margins and that she must have negative margins for lumpectomy to be an appropriate oncologic procedure. I will make every effort to obtain negative margins at her initial operation, but there is a 10-15% chance that she will require a second operation for re-excision, or possibly a total mastectomy. We will not know the margin status until after her final pathology has returned.      I described additional risks and potential complications associated with surgery, including, but not limited to, bleeding, infection, deformity/poor cosmetic result, chronic pain, numbness, seroma, hematoma, deep venous thrombosis, skin flap necrosis, disease recurrence and the possibility of requiring additional surgery. We also discussed other treatment options including the option of not undergoing any surgical treatment and the risks associated with this including disease progression. She expressed an understanding of these factors and wished to proceed.     We discussed that in her case, systemic treatment would likely involve endocrine therapy/targeted therapy.      XAVIER LUX M.D.  General and Endoscopic Surgery  [facility]     [address], [address]  [place], [place], [address]  P: [phone]  F: [phone]

## 2022-07-20 NOTE — ANESTHESIA POSTPROCEDURE EVALUATION
Patient: Radha Torre    Procedure Summary     Date: 07/20/22 Room / Location: [facility] OR  / [facility] MAIN OR    Anesthesia Start: 0928 Anesthesia Stop: 1252    Procedures:       RIGHT BREAST WIRE LOCALIZED LUMPECTOMY FOR DCIS (Right Breast)      RIGHT BREAST ONCOPLASTIC CLOSURE LEFT BREST REDUCTION FOR SYMMETRY (Bilateral Chest) Diagnosis:       Ductal carcinoma in situ (DCIS) of right breast      (Ductal carcinoma in situ (DCIS) of right breast [D05.11])    Surgeons: Daisy Romero MD; Rebecca Bhakta MD Provider: Kota Hoffmann MD    Anesthesia Type: general ASA Status: 3          Anesthesia Type: general    Vitals  Vitals Value Taken Time   /74 07/20/22 1346   Temp 37.1 °C (98.8 °F) 07/20/22 1345   Pulse 92 07/20/22 1354   Resp 16 07/20/22 1345   SpO2 93 % 07/20/22 1355   Vitals shown include unvalidated device data.        Post Anesthesia Care and Evaluation    Patient location during evaluation: PACU  Patient participation: complete - patient participated  Level of consciousness: awake and alert  Pain management: adequate    Airway patency: patent  Anesthetic complications: No anesthetic complications    Cardiovascular status: acceptable  Respiratory status: acceptable  Hydration status: acceptable    Comments: --------------------            07/20/22               1500     --------------------   BP:       130/68     Pulse:      93       Resp:       16       Temp:                SpO2:      91%      --------------------

## 2022-07-20 NOTE — OP NOTE
OPERATIVE REPORT     DATE:  7/20/2022     SURGEON: Daisy Romero MD      ASSISTANT:  Milagros brown, who was present for necessary suctioning, retracting, suturing throughout the procedure      OPERATION PERFORMED:  Right breast wire bracketed lumpectomy     PREOPERATIVE DIAGNOSIS:  DCIS of the right breast      POSTOPERATIVE DIAGNOSIS: Same     ANESTHESIA:  General     SPECIMEN:   Right breast wire localized lumpectomy (short stitch superior, long lateral, double anterior)  Additional superior margin (stitch marks true margin)  Additional inferior margin (stitch marks true margin)  Additional medial margin (stitch marks true margin)  Additional lateral margin (stitch marks true margin)  Additional posterior margin (stitch marks true margin)    DRAINS: None     BLOOD LOSS: Minimal     INDICATION FOR OPERATION:Radha Torre is a 47 y.o. lady who was recently diagnosed with DCIS of the right breast.  She ultimately elected to proceed with right breast wire bracketed  Lumpectomy with oncoplastic closure and bilateral reduction mammoplasty by Dr Bhakta. All risks (including bleeding, infection, damage to surrounding structures, need for further surgery, pathologic upgrade), benefits and alternatives were explained to the patient who agreed and wished to proceed. Informed consent was signed.      OPERATIVE COURSE: The patient was taken to the operating room, transferred onto the operating room table, and underwent anesthesia without incident. The patient was prepped and draped in sterile fashion. A time out was performed and preoperative antibiotics were given.  Half percent Marcaine with epinephrine was injected into the skin and subcutaneous tissues. Dr. Bhakta had previously marked an incision for the reduction mammoplasty.  The horizontal medial aspect of this was opened with a scalpel.  Bovie electrocautery was used to create a flap superiorly along the length of the wire.  The clip was encountered and  removed with a DeBakey and placed in the specimen container.  Due to the close proximity of the skin we did elect to remove a small wedge of skin over this area.  This was incised with a scalpel.  Bovie electrocautery was used to dissect along all 4 sides of the 2 wires until posterior to them.  The tissue was amputated at its base.  It was marked as listed above.  Specimen radiograph confirmed clip, wire, and abnormal breast tissue.  It was sent for fresh permanent specimen. Additional margins were taken along all borders other than anterior. These were done with Allis clamps and approximately 1 cm in thickness. The area was irrigated and appeared hemostatic.  There were no signs of bleeding.  The area was irrigated and appeared hemostatic.  Bovie electrocautery was used for any small bleeding. Dr. Bhakta then performed the reconstruction. The patient tolerated the procedure well. All needle and lap counts were correct at the end of the case. The patient was then awoken from anesthesia and taken to recovery for further monitoring.         Daisy Romero MD   General and Endoscopic Surgery  [facility]     [address] [place] [address], [address]  [place], [place], [address]  P: [phone]  F: [phone]

## 2022-07-20 NOTE — ANESTHESIA PREPROCEDURE EVALUATION
" Anesthesia Evaluation     Patient summary reviewed and Nursing notes reviewed                Airway   Mallampati: II  TM distance: >3 FB  Neck ROM: full  Dental      Pulmonary    (+) a smoker Former, asthma,  Cardiovascular     ECG reviewed  Rhythm: regular  Rate: normal    (+) hypertension, dysrhythmias PVC, hyperlipidemia,       Neuro/Psych- negative ROS  GI/Hepatic/Renal/Endo    (+) morbid obesity, GERD, GI bleeding ,     Musculoskeletal (-) negative ROS    Abdominal    Substance History - negative use     OB/GYN negative ob/gyn ROS         Other                        Anesthesia Plan    ASA 3     general     (Goes by \"Anna\"  BMI  Duo-Neb pre op    I have reviewed the patient's history with the patient and the chart, including all pertinent laboratory results and imaging. I have explained the risks of anesthesia including but not limited to dental damage, corneal abrasion, nerve injury, MI, stroke, and death. Questions asked and answered. Anesthetic plan discussed with patient and team as indicated. Patient expressed understanding of the above.  )  intravenous induction     Anesthetic plan, risks, benefits, and alternatives have been provided, discussed and informed consent has been obtained with: patient.        CODE STATUS:       "

## 2022-07-20 NOTE — BRIEF OP NOTE
BREAST ONCOPLASTY AND BREAST REDUCTION FOR IMMEDIATE RECONSTRUCTION  Progress Note    Radha Torre  7/20/2022    Pre-op Diagnosis:   Ductal carcinoma in situ (DCIS) of right breast [D05.11]   Breast asymmetry       Post-Op Diagnosis Codes:     * Ductal carcinoma in situ (DCIS) of right breast [D05.11]   Breast asymmetry    Procedure/CPT® Codes:    Procedure(s):  RIGHT BREAST WIRE LOCALIZED LUMPECTOMY FOR DCIS  RIGHT BREAST ONCOPLASTIC CLOSURE   LEFT BREST REDUCTION FOR SYMMETRY    Surgeon(s):  Daisy Romero MD Palazzo, Michelle D, MD    Anesthesia: General    Staff:   Circulator: Martha Rivas RN; Radha Bell RN  Scrub Person: Olya Fuentes; Kristen Stringer  Assistant: Milagros Barrios PA-C  Assistant: Milagros Barrios PA-C      Estimated Blood Loss: 25 mL    Urine Voided: 400 mL    Specimens:                          Drains:   Closed/Suction Drain 1 Right Breast Bulb 15 Fr. (Active)   Dressing Status Clean;Dry;Intact 07/20/22 1249   Drainage Appearance Serosanguineous 07/20/22 1249   Status To bulb suction 07/20/22 1249       [REMOVED] Urethral Catheter Non-latex;Silicone 16 Fr. (Removed)       Findings:   R breast lumpectoy 23 g + 10 g margins + 75 g additional tissue = 108 g  L breast 95 g reduction  Central ound for both breasts pedicles        Complications: none        Rebecca Bhakta MD     Date: 7/20/2022  Time: 13:15 EDT

## 2022-07-21 LAB
LAB AP CASE REPORT: NORMAL
LAB AP CASE REPORT: NORMAL
LAB AP CLINICAL INFORMATION: NORMAL
LAB AP CLINICAL INFORMATION: NORMAL
LAB AP DIAGNOSIS COMMENT: NORMAL
LAB AP DIAGNOSIS COMMENT: NORMAL
LAB AP SYNOPTIC CHECKLIST: NORMAL
LAB AP SYNOPTIC CHECKLIST: NORMAL
PATH REPORT.FINAL DX SPEC: NORMAL
PATH REPORT.FINAL DX SPEC: NORMAL
PATH REPORT.GROSS SPEC: NORMAL
PATH REPORT.GROSS SPEC: NORMAL

## 2022-07-21 RX ORDER — METOPROLOL TARTRATE 50 MG/1
50 TABLET, FILM COATED ORAL 2 TIMES DAILY
Qty: 180 TABLET | Refills: 1 | Status: SHIPPED | OUTPATIENT
Start: 2022-07-21 | End: 2023-01-23

## 2022-07-21 NOTE — TELEPHONE ENCOUNTER
Rx Refill Note  Requested Prescriptions     Pending Prescriptions Disp Refills   • metoprolol tartrate (LOPRESSOR) 50 MG tablet       Sig: Take 1 tablet by mouth 2 (Two) Times a Day.      Last office visit with prescribing clinician: 7/1/2022      Next office visit with prescribing clinician: Visit date not found            Arlene Maria MA  07/21/22, 14:23 EDT

## 2022-07-22 ENCOUNTER — TELEPHONE (OUTPATIENT)
Dept: SURGERY | Facility: CLINIC | Age: 47
End: 2022-07-22

## 2022-07-22 DIAGNOSIS — D05.10 DUCTAL CARCINOMA IN SITU (DCIS) OF BREAST, UNSPECIFIED LATERALITY: Primary | ICD-10-CM

## 2022-07-22 NOTE — TELEPHONE ENCOUNTER
Called Radha Torre regarding recent surgical pathology.    Right breast DCIS (10mm), grade II, margins negative, ER+/OK+.   [id number]    Follow up with Dr. Sharma as scheduled.   Referral placed for radiation oncology.   Follow up in two weeks for wound check and further cancer surveillance planning.     Daisy Romero MD

## 2022-08-04 ENCOUNTER — OFFICE VISIT (OUTPATIENT)
Dept: ONCOLOGY | Facility: CLINIC | Age: 47
End: 2022-08-04

## 2022-08-04 ENCOUNTER — LAB (OUTPATIENT)
Dept: OTHER | Facility: HOSPITAL | Age: 47
End: 2022-08-04

## 2022-08-04 VITALS
RESPIRATION RATE: 18 BRPM | WEIGHT: 293 LBS | TEMPERATURE: 97.3 F | BODY MASS INDEX: 45.99 KG/M2 | DIASTOLIC BLOOD PRESSURE: 78 MMHG | HEIGHT: 67 IN | OXYGEN SATURATION: 95 % | SYSTOLIC BLOOD PRESSURE: 114 MMHG | HEART RATE: 99 BPM

## 2022-08-04 DIAGNOSIS — D05.11 DUCTAL CARCINOMA IN SITU (DCIS) OF RIGHT BREAST: Primary | ICD-10-CM

## 2022-08-04 DIAGNOSIS — D05.11 DUCTAL CARCINOMA IN SITU (DCIS) OF RIGHT BREAST: ICD-10-CM

## 2022-08-04 LAB
ALBUMIN SERPL-MCNC: 4.3 G/DL (ref 3.5–5.2)
ALBUMIN/GLOB SERPL: 1.5 G/DL
ALP SERPL-CCNC: 103 U/L (ref 39–117)
ALT SERPL W P-5'-P-CCNC: 21 U/L (ref 1–33)
ANION GAP SERPL CALCULATED.3IONS-SCNC: 9.5 MMOL/L (ref 5–15)
AST SERPL-CCNC: 19 U/L (ref 1–32)
BASOPHILS # BLD AUTO: 0.09 10*3/MM3 (ref 0–0.2)
BASOPHILS NFR BLD AUTO: 0.7 % (ref 0–1.5)
BILIRUB SERPL-MCNC: 0.2 MG/DL (ref 0–1.2)
BUN SERPL-MCNC: 9 MG/DL (ref 6–20)
BUN/CREAT SERPL: 13 (ref 7–25)
CALCIUM SPEC-SCNC: 9.1 MG/DL (ref 8.6–10.5)
CHLORIDE SERPL-SCNC: 102 MMOL/L (ref 98–107)
CO2 SERPL-SCNC: 27.5 MMOL/L (ref 22–29)
CREAT SERPL-MCNC: 0.69 MG/DL (ref 0.57–1)
DEPRECATED RDW RBC AUTO: 44.3 FL (ref 37–54)
EGFRCR SERPLBLD CKD-EPI 2021: 108.5 ML/MIN/1.73
EOSINOPHIL # BLD AUTO: 1.12 10*3/MM3 (ref 0–0.4)
EOSINOPHIL NFR BLD AUTO: 9.2 % (ref 0.3–6.2)
ERYTHROCYTE [DISTWIDTH] IN BLOOD BY AUTOMATED COUNT: 13.1 % (ref 12.3–15.4)
GLOBULIN UR ELPH-MCNC: 2.9 GM/DL
GLUCOSE SERPL-MCNC: 120 MG/DL (ref 65–99)
HCT VFR BLD AUTO: 39.6 % (ref 34–46.6)
HGB BLD-MCNC: 12.5 G/DL (ref 12–15.9)
IMM GRANULOCYTES # BLD AUTO: 0.13 10*3/MM3 (ref 0–0.05)
IMM GRANULOCYTES NFR BLD AUTO: 1.1 % (ref 0–0.5)
LYMPHOCYTES # BLD AUTO: 2.5 10*3/MM3 (ref 0.7–3.1)
LYMPHOCYTES NFR BLD AUTO: 20.6 % (ref 19.6–45.3)
MCH RBC QN AUTO: 28.9 PG (ref 26.6–33)
MCHC RBC AUTO-ENTMCNC: 31.6 G/DL (ref 31.5–35.7)
MCV RBC AUTO: 91.7 FL (ref 79–97)
MONOCYTES # BLD AUTO: 0.85 10*3/MM3 (ref 0.1–0.9)
MONOCYTES NFR BLD AUTO: 7 % (ref 5–12)
NEUTROPHILS NFR BLD AUTO: 61.4 % (ref 42.7–76)
NEUTROPHILS NFR BLD AUTO: 7.44 10*3/MM3 (ref 1.7–7)
NRBC BLD AUTO-RTO: 0 /100 WBC (ref 0–0.2)
PLATELET # BLD AUTO: 338 10*3/MM3 (ref 140–450)
PMV BLD AUTO: 10.8 FL (ref 6–12)
POTASSIUM SERPL-SCNC: 4.9 MMOL/L (ref 3.5–5.2)
PROT SERPL-MCNC: 7.2 G/DL (ref 6–8.5)
RBC # BLD AUTO: 4.32 10*6/MM3 (ref 3.77–5.28)
SODIUM SERPL-SCNC: 139 MMOL/L (ref 136–145)
WBC NRBC COR # BLD: 12.13 10*3/MM3 (ref 3.4–10.8)

## 2022-08-04 PROCEDURE — 85025 COMPLETE CBC W/AUTO DIFF WBC: CPT | Performed by: INTERNAL MEDICINE

## 2022-08-04 PROCEDURE — 80053 COMPREHEN METABOLIC PANEL: CPT | Performed by: INTERNAL MEDICINE

## 2022-08-04 PROCEDURE — 99215 OFFICE O/P EST HI 40 MIN: CPT | Performed by: INTERNAL MEDICINE

## 2022-08-04 PROCEDURE — 36415 COLL VENOUS BLD VENIPUNCTURE: CPT

## 2022-08-04 RX ORDER — TAMOXIFEN CITRATE 20 MG/1
20 TABLET ORAL DAILY
Qty: 30 TABLET | Refills: 5 | Status: SHIPPED | OUTPATIENT
Start: 2022-08-04 | End: 2022-09-03

## 2022-08-04 NOTE — PROGRESS NOTES
Subjective   Radha Torre is a 47 y.o. female.  Referred by Sofia Mora for right breast ductal carcinoma in situ    History of Present Illness   Mr. Torre is a 47-year-old premenopausal  lady who presented with a screen detected abnormality of the right breast in March 2022.  She has had previous normal screening mammograms.  This mammogram had been delayed by about 8 months due to COVID-19.    3/7/2022-bilateral screening mammogram  Indeterminate left breast asymmetry.  Further evaluation recommended.  Indeterminate right breast calcifications.  Further evaluation recommended.    4/18/2022-bilateral diagnostic mammogram and ultrasound  The area of focal asymmetry in the middle third of the left breast effaces and consistent with normal breast parenchyma  Right breast-presence of cluster of microcalcifications in the middle third lower inner quadrant.  Right breast finding suspicious for malignancy.  Stereotactic guided biopsy recommended.    5/13/2022-right breast ear tactic biopsy  Pathology consistent with ductal carcinoma in situ  Intermediate grade  ER +85% strong  WY +85% strong    5/20/2022-bilateral breast MRI  There is a 2.1 cm biopsy cavity with two 1 cm focal areas of enhancement along the cavity margins representing the site of biopsy-proven malignancy.  Residual calcifications measure 1.4 cm on postbiopsy mammogram.  No MRI evidence of left breast malignancy.    5/25/2022-she has been evaluated by Dr. Romero and has been recommended to undergo a right breast lumpectomy.  Lumpectomy has not been scheduled yet.  Pending plastics evaluation by Dr. Bhakta.    5/29/2022-Invitae 9 gene stat panel negative.    Family history significant for diagnosis of breast cancer in her mother at the age of 74.  Her mother is a patient of mine, Ms. Marce Bell.   No other family history of breast ovarian pancreatic prostate carcinoma.  No history of melanomas.    Radha denies palpating any new  breast masses, nipple changes, skin changes or nipple discharge.    7/20/2022-right breast lumpectomy and bilateral breast reduction  Ductal carcinoma in situ measuring 10 mm  Grade 2  ER/WV positive  All margins negative    Interval history  She is recovering well from surgery.  She had some allergic reaction to the tape and has some rash around the incisions.  Other than that no problems.  She is accompanied by her  to the visit today.    The following portions of the patient's history were reviewed and updated as appropriate: allergies, current medications, past family history, past medical history, past social history, past surgical history and problem list.    Past Medical History:   Diagnosis Date   • Abnormal ECG    • Acid reflux     ON OCC   • Anesthesia complication     BP DROPPED DUE TO EPIDURAL FROM C-SECTIONS   • Anxiety    • Asthma 2017    Occasional reactive asthma/bronchitis after respiratory illness   • At risk for sleep apnea     STOP BANG 5   • Breast cancer (HCC) 05/13/2022    Right breast ductal carcinoma in situ, ER/WV positive, Ki-67 5%   • Depression 2004    Postpartum with 3 births   • Deviated septum    • Fatigue    • Fissure, anal    • History of UTI    • Hyperlipidemia    • Hypertension 2017    noted at last few urgent care visits   • Insomnia    • Migraine    • Obesity    • Pain in elbow joint     bilateral   • Rectal bleeding     RELATED TO RECTAL FISSUE   • Seasonal allergic rhinitis         Past Surgical History:   Procedure Laterality Date   • BREAST BIOPSY Right 05/16/2022    Tomosynthesis guided Mammotome vacuum assisted right breast 4 o'clock position-Dr. Desmond Zamarripa, [facility]   • BREAST LUMPECTOMY Right 7/20/2022    Procedure: RIGHT BREAST WIRE LOCALIZED LUMPECTOMY FOR DCIS;  Surgeon: Daisy Romero MD;  Location: [facility];  Service: General;  Laterality: Right;   • BREAST SURGERY Bilateral 7/20/2022    Procedure: RIGHT BREAST ONCOPLASTIC CLOSURE LEFT BREST REDUCTION  FOR SYMMETRY;  Surgeon: Rebecca Bhakta MD;  Location: [facility] MAIN OR;  Service: Plastics;  Laterality: Bilateral;   •  SECTION N/A     x 3   • D & C HYSTEROSCOPY ENDOMETRIAL ABLATION N/A 2018    Procedure: DILATATION AND CURETTAGE HYSTEROSCOPY NOVASURE ENDOMETRIAL ABLATION;  Surgeon: Puneet Carreno MD;  Location: [facility] OR [facility];  Service: Obstetrics/Gynecology   • MOHS SURGERY Left     Left Elbow & Left Scalp   • SKIN LESION EXCISION Right     Right Breast, Benign   • TUBAL ABDOMINAL LIGATION Bilateral 2013    after last    • WISDOM TOOTH EXTRACTION Bilateral         Family History   Problem Relation Age of Onset   • Anxiety disorder Mother    • Depression Mother    • Stroke Mother    • Skin cancer Mother         BCE SCE   • Colon polyps Mother    • Breast cancer Mother 74        DCIS with invasive. DCIS Left Breast 2:30 position ER/ME +, Her2-. Left Breast UOQ Invasive DCIS is multifocal, Two benign sentinel lymph nodes, Pathologic stage: pT1a, N(sn)0.   • Arthritis Mother    • Cancer Mother         DCIS and invasive   • Alcohol abuse Brother    • Autism Son    • Learning disabilities Son         Autism   • Depression Son    • Depression Maternal Grandmother    • Anxiety disorder Maternal Grandmother    • Hyperlipidemia Maternal Grandmother    • Hypertension Maternal Grandmother    • Heart disease Maternal Grandmother    • Arthritis Maternal Grandmother    • Stroke Maternal Grandmother    • Coronary artery disease Maternal Grandfather    • Esophageal cancer Paternal Grandfather 85   • Colon cancer Neg Hx    • Malig Hyperthermia Neg Hx         Social History     Socioeconomic History   • Marital status:    Tobacco Use   • Smoking status: Former Smoker     Packs/day: 1.00     Years: 5.00     Pack years: 5.00     Types: Cigarettes, Cigarettes     Quit date: 2001     Years since quittin.2   • Smokeless tobacco: Never Used   Vaping Use   • Vaping Use: Never used  "  Substance and Sexual Activity   • Alcohol use: Yes     Alcohol/week: 2.0 standard drinks     Types: 2 Drinks containing 0.5 oz of alcohol per week     Comment: Socially; few times a month   • Drug use: Never   • Sexual activity: Yes     Partners: Male     Birth control/protection: Surgical        OB History        3    Para   3    Term   3            AB        Living   3       SAB        IAB        Ectopic        Molar        Multiple        Live Births   3                 Allergies   Allergen Reactions   • Bupropion Palpitations     POSSIBLE   • Adhesive Tape Rash     SWELLS. Localized if left on for extended periods of time            Review of Systems   Constitutional: Negative.    HENT: Negative.    Eyes: Negative.    Respiratory: Negative.    Genitourinary: Negative.    Musculoskeletal: Positive for arthralgias.   Allergic/Immunologic: Negative.    Neurological: Negative.    Psychiatric/Behavioral: The patient is nervous/anxious.      Review of systems as mentioned in the HPI    Objective   Blood pressure 114/78, pulse 99, temperature 97.3 °F (36.3 °C), temperature source Temporal, resp. rate 18, height 170.2 cm (67.01\"), weight (!) 136 kg (300 lb 9.6 oz), last menstrual period 2022, SpO2 95 %, not currently breastfeeding.   Physical Exam  Vitals reviewed.   Constitutional:       Appearance: She is obese.   HENT:      Head: Normocephalic and atraumatic.      Nose: Nose normal.      Mouth/Throat:      Mouth: Mucous membranes are moist.   Eyes:      Extraocular Movements: Extraocular movements intact.      Pupils: Pupils are equal, round, and reactive to light.   Cardiovascular:      Rate and Rhythm: Normal rate and regular rhythm.      Pulses: Normal pulses.   Pulmonary:      Effort: Pulmonary effort is normal.   Musculoskeletal:         General: Normal range of motion.      Cervical back: Normal range of motion.   Neurological:      General: No focal deficit present.      Mental Status: " She is alert and oriented to person, place, and time.   Psychiatric:         Mood and Affect: Mood normal.         Behavior: Behavior normal.         Thought Content: Thought content normal.         Judgment: Judgment normal.       Breast Exam: Changes consistent with breast reduction in both breast.  Palpation not performed today.    Lab on 08/04/2022   Component Date Value Ref Range Status   • WBC 08/04/2022 12.13 (A) 3.40 - 10.80 10*3/mm3 Final   • RBC 08/04/2022 4.32  3.77 - 5.28 10*6/mm3 Final   • Hemoglobin 08/04/2022 12.5  12.0 - 15.9 g/dL Final   • Hematocrit 08/04/2022 39.6  34.0 - 46.6 % Final   • MCV 08/04/2022 91.7  79.0 - 97.0 fL Final   • MCH 08/04/2022 28.9  26.6 - 33.0 pg Final   • MCHC 08/04/2022 31.6  31.5 - 35.7 g/dL Final   • RDW 08/04/2022 13.1  12.3 - 15.4 % Final   • RDW-SD 08/04/2022 44.3  37.0 - 54.0 fl Final   • MPV 08/04/2022 10.8  6.0 - 12.0 fL Final   • Platelets 08/04/2022 338  140 - 450 10*3/mm3 Final   • Neutrophil % 08/04/2022 61.4  42.7 - 76.0 % Final   • Lymphocyte % 08/04/2022 20.6  19.6 - 45.3 % Final   • Monocyte % 08/04/2022 7.0  5.0 - 12.0 % Final   • Eosinophil % 08/04/2022 9.2 (A) 0.3 - 6.2 % Final   • Basophil % 08/04/2022 0.7  0.0 - 1.5 % Final   • Immature Grans % 08/04/2022 1.1 (A) 0.0 - 0.5 % Final   • Neutrophils, Absolute 08/04/2022 7.44 (A) 1.70 - 7.00 10*3/mm3 Final   • Lymphocytes, Absolute 08/04/2022 2.50  0.70 - 3.10 10*3/mm3 Final   • Monocytes, Absolute 08/04/2022 0.85  0.10 - 0.90 10*3/mm3 Final   • Eosinophils, Absolute 08/04/2022 1.12 (A) 0.00 - 0.40 10*3/mm3 Final   • Basophils, Absolute 08/04/2022 0.09  0.00 - 0.20 10*3/mm3 Final   • Immature Grans, Absolute 08/04/2022 0.13 (A) 0.00 - 0.05 10*3/mm3 Final   • nRBC 08/04/2022 0.0  0.0 - 0.2 /100 WBC Final   Admission on 07/20/2022, Discharged on 07/20/2022   Component Date Value Ref Range Status   • Case Report 07/20/2022    Final                    Value:Surgical Pathology Report                          Case: [id number]                                  Authorizing Provider:  Daisy Romero MD        Collected:           07/20/2022 10:07 AM          Ordering Location:     [facility]  Received:            07/20/2022 10:27 AM                                 MAIN OR                                                                      Pathologist:           Hans Diana MD                                                          Specimens:   1) - Breast, Right, Right breast lumpectomy - short stitch superior, long stitch                    lateral and double stitch anterior                                                                  2) - Breast, Right, Right breast additional Posterior margin- stitch marks true                     margin                                                                                              3) - Breast, Right, Right breast additional Medial margin- stitch marks true margin                                           4) - Breast, Right, Right breast additional Lateral margin- stitch marks true margin                5) - Breast, Right, Right breast additional Superior margin- stitch marks true margin               6) - Breast, Right, Right breast additional Inferior margin- stitch marks true margin               7) - Breast, Left, Left breast tissue                                                               8) - Breast, Right, Additional right breast tissue                                        • Clinical Information 07/20/2022    Final                    Value:This result contains rich text formatting which cannot be displayed here.   • Final Diagnosis 07/20/2022    Final                    Value:This result contains rich text formatting which cannot be displayed here.   • Synoptic Checklist 07/20/2022    Final                    Value:DCIS OF THE BREAST: Resection                            DCIS OF THE BREAST: COMPLETE EXCISION - All Specimens                             8th Edition - Protocol posted: 3/23/2022                                                        SPECIMEN                               Procedure:    Excision (less than total mastectomy)                                Specimen Laterality:    Right                                                         TUMOR                               Tumor Site:    Lower inner quadrant                                Histologic Type:    Ductal carcinoma in situ                                Size (Extent) of DCIS:    Estimated size (extent) of DCIS is at least (Millimeters): 10 mm                               Architectural Patterns:    Cribriform                                Architectural Patterns:    Micropapillary                                Architectural Patterns:    Solid                                Nuclear Grade:    Grade II (intermediate)                                Necrosis:    Not identified                                Microcalcifications:    Present in DCIS                                Microcalcifications:    Present in nonneoplastic tissue                                                         MARGINS                               Margin Status:    All margins negative for DCIS                                  Distance from DCIS to Closest Margin:    17 mm                                 Closest Margin(s) to DCIS:    Superior                                  Closest Margin(s) to DCIS:    Inferior                                                         REGIONAL LYMPH NODES                               Regional Lymph Node Status:    Not applicable (no regional lymph nodes submitted or found)                                                         DISTANT METASTASIS                                                        PATHOLOGIC STAGE CLASSIFICATION (pTNM, AJCC 8th Edition)                               Reporting of pT, pN, and (when applicable) pM categories is based on information  available to the pathologist at the time the report is issued. As per the AJCC (Chapter 1, 8th Ed.) it is the managing physician’s responsibility to establish the final pathologic stage based upon all pertinent information, including but potentially not limited to this pathology report.                               pT Category:    pTis (DCIS)                                pN Category:    pN not assigned (no nodes submitted or found)                                                            Breast Biomarker Testing Performed on Previous Biopsy:                                     Estrogen Receptor (ER) Status:    Positive                                    Percentage of Cells with Nuclear Positivity:    81-90%                                Breast Biomarker Testing Performed on Previous Biopsy:                                     Progesterone Receptor (PgR) Status:    Positive                                    Percentage of Cells with Nuclear Positivity:    81-90%                                  Testing Performed on Case Number:    [id number]      • Comment 07/20/2022    Final                    Value:This result contains rich text formatting which cannot be displayed here.   • Gross Description 07/20/2022    Final                    Value:This result contains rich text formatting which cannot be displayed here.   • Case Report 07/20/2022    Final                    Value:Surgical Pathology Report                         Case: [id number]                                  Authorizing Provider:  Daisy Romero MD        Collected:           07/20/2022 10:07 AM          Ordering Location:     [facility]  Received:            07/20/2022 10:27 AM                                 MAIN OR                                                                      Pathologist:           Hans Diana MD                                                          Specimens:   1) - Breast, Right, Right breast  lumpectomy - short stitch superior, long stitch                    lateral and double stitch anterior                                                                  2) - Breast, Right, Right breast additional Posterior margin- stitch marks true                     margin                                                                                              3) - Breast, Right, Right breast additional Medial margin- stitch marks true margin                                           4) - Breast, Right, Right breast additional Lateral margin- stitch marks true margin                5) - Breast, Right, Right breast additional Superior margin- stitch marks true margin               6) - Breast, Right, Right breast additional Inferior margin- stitch marks true margin               7) - Breast, Left, Left breast tissue                                                               8) - Breast, Right, Additional right breast tissue                                        • Clinical Information 07/20/2022    Final                    Value:This result contains rich text formatting which cannot be displayed here.   • Final Diagnosis 07/20/2022    Final                    Value:This result contains rich text formatting which cannot be displayed here.   • Synoptic Checklist 07/20/2022    Final                    Value:DCIS OF THE BREAST: Resection                            DCIS OF THE BREAST: COMPLETE EXCISION - All Specimens                            8th Edition - Protocol posted: 3/23/2022                                                        SPECIMEN                               Procedure:    Excision (less than total mastectomy)                                Specimen Laterality:    Right                                                         TUMOR                               Tumor Site:    Lower inner quadrant                                Histologic Type:    Ductal carcinoma in situ                                 Size (Extent) of DCIS:    Estimated size (extent) of DCIS is at least (Millimeters): 10 mm                               Architectural Patterns:    Cribriform                                Architectural Patterns:    Micropapillary                                Architectural Patterns:    Solid                                Nuclear Grade:    Grade II (intermediate)                                Necrosis:    Not identified                                Microcalcifications:    Present in DCIS                                Microcalcifications:    Present in nonneoplastic tissue                                                         MARGINS                               Margin Status:    All margins negative for DCIS                                  Distance from DCIS to Closest Margin:    17 mm                                 Closest Margin(s) to DCIS:    Superior                                  Closest Margin(s) to DCIS:    Inferior                                                         REGIONAL LYMPH NODES                               Regional Lymph Node Status:    Not applicable (no regional lymph nodes submitted or found)                                                         DISTANT METASTASIS                                                        PATHOLOGIC STAGE CLASSIFICATION (pTNM, AJCC 8th Edition)                               Reporting of pT, pN, and (when applicable) pM categories is based on information available to the pathologist at the time the report is issued. As per the AJCC (Chapter 1, 8th Ed.) it is the managing physician’s responsibility to establish the final pathologic stage based upon all pertinent information, including but potentially not limited to this pathology report.                               pT Category:    pTis (DCIS)                                pN Category:    pN not assigned (no nodes submitted or found)                                                             Breast Biomarker Testing Performed on Previous Biopsy:                                     Estrogen Receptor (ER) Status:    Positive                                    Percentage of Cells with Nuclear Positivity:    81-90%                                Breast Biomarker Testing Performed on Previous Biopsy:                                     Progesterone Receptor (PgR) Status:    Positive                                    Percentage of Cells with Nuclear Positivity:    81-90%                                  Testing Performed on Case Number:    [id number]      • Comment 07/20/2022    Final                    Value:This result contains rich text formatting which cannot be displayed here.   • Gross Description 07/20/2022    Final                    Value:This result contains rich text formatting which cannot be displayed here.   Pre-Admission Testing on 07/18/2022   Component Date Value Ref Range Status   • COVID19 07/18/2022 Not Detected  Not Detected - Ref. Range Final   • Glucose 07/18/2022 83  65 - 99 mg/dL Final   • BUN 07/18/2022 10  6 - 20 mg/dL Final   • Creatinine 07/18/2022 0.64  0.57 - 1.00 mg/dL Final   • Sodium 07/18/2022 137  136 - 145 mmol/L Final   • Potassium 07/18/2022 5.0  3.5 - 5.2 mmol/L Final   • Chloride 07/18/2022 101  98 - 107 mmol/L Final   • CO2 07/18/2022 27.0  22.0 - 29.0 mmol/L Final   • Calcium 07/18/2022 9.1  8.6 - 10.5 mg/dL Final   • BUN/Creatinine Ratio 07/18/2022 15.6  7.0 - 25.0 Final   • Anion Gap 07/18/2022 9.0  5.0 - 15.0 mmol/L Final   • eGFR 07/18/2022 110.5  >60.0 mL/min/1.73 Final    National Kidney Foundation and American Society of Nephrology (ASN) Task Force recommended calculation based on the Chronic Kidney Disease Epidemiology Collaboration (CKD-EPI) equation refit without adjustment for race.   • WBC 07/18/2022 9.45  3.40 - 10.80 10*3/mm3 Final   • RBC 07/18/2022 4.68  3.77 - 5.28 10*6/mm3 Final   • Hemoglobin 07/18/2022 13.8  12.0 - 15.9 g/dL Final   • Hematocrit  07/18/2022 40.9  34.0 - 46.6 % Final   • MCV 07/18/2022 87.4  79.0 - 97.0 fL Final   • MCH 07/18/2022 29.5  26.6 - 33.0 pg Final   • MCHC 07/18/2022 33.7  31.5 - 35.7 g/dL Final   • RDW 07/18/2022 12.6  12.3 - 15.4 % Final   • RDW-SD 07/18/2022 40.1  37.0 - 54.0 fl Final   • MPV 07/18/2022 11.0  6.0 - 12.0 fL Final   • Platelets 07/18/2022 331  140 - 450 10*3/mm3 Final   • HCG Qualitative 07/18/2022 Negative  Negative Final   • QT Interval 07/18/2022 390  ms Final   Admission on 07/10/2022, Discharged on 07/10/2022   Component Date Value Ref Range Status   • COVID19 07/10/2022 Not Detected  Not Detected - Ref. Range Final   • Influenza A PCR 07/10/2022 Not Detected  Not Detected Final   • Influenza B PCR 07/10/2022 Not Detected  Not Detected Final        XR Chest 1 View    Result Date: 7/10/2022  No acute cardiopulmonary findings. Signer Name: SANDRA Bell MD  Signed: 7/10/2022 6:03 PM  Workstation Name: [id number]  [facility]    Mammo Breast Placement Device Initial Without Biopsy    Result Date: 7/20/2022  Technically successful mammographic guided bracketed right breast needle localization.  This report was finalized on 7/20/2022 11:04 AM by Dr. Desmond Zamarripa M.D.      XR Breast Specimen    Result Date: 7/20/2022  Technically successful mammographic guided bracketed right breast needle localization.  This report was finalized on 7/20/2022 11:04 AM by Dr. Desmond Zamarripa M.D.       Screening mammogram, bilateral diagnostic mammogram, breast MRI-images independently reviewed and interpreted by me.  Details summarized in the HPI.     8/4/2022-labs reviewed and mild leukocytosis.    Assessment & Plan       *Ductal carcinoma in situ of the right breast  · ER +85% strong, TN +85% strong, intermediate grade  · Status post right breast lumpectomy and bilateral breast reduction  · Pathology showed DCIS measuring 10 mm, margins negative  · We discussed adjuvant endocrine therapy for 5  years with tamoxifen 20 mg daily since she is premenopausal  · Adverse effects of tamoxifen including vasomotor symptoms, risk of DVT PE, risk of cataracts, risk of uterine cancer discussed.  · She is willing to proceed with tamoxifen  · Prescription sent to pharmacy.  · Referred to radiation oncology, start tamoxifen following completion of radiation    *Anxiety/depression  · She is currently on Lexapro.  · Wellbutrin weaned off  · Continue Lexapro and tamoxifen    *Bilateral elbow pain  · Chronic and unchanged  · Could be positional     *Obesity  · BMI 49  · Discussed lifestyle changes including diet and exercise  · Nutrition referral made today    *Follow-up-6 months    40 minutes spent on the encounter including reviewing the medical records, face-to-face time, documentation on the same day

## 2022-08-08 ENCOUNTER — OFFICE VISIT (OUTPATIENT)
Dept: SURGERY | Facility: CLINIC | Age: 47
End: 2022-08-08

## 2022-08-08 DIAGNOSIS — D05.11 DUCTAL CARCINOMA IN SITU (DCIS) OF RIGHT BREAST: Primary | ICD-10-CM

## 2022-08-08 PROCEDURE — 99024 POSTOP FOLLOW-UP VISIT: CPT | Performed by: STUDENT IN AN ORGANIZED HEALTH CARE EDUCATION/TRAINING PROGRAM

## 2022-08-14 NOTE — PROGRESS NOTES
General Surgery Breast Cancer History and Physical Exam     Summary:    Radha Torre is a 47 y.o. lady. The patient presents with a history of right breast DCIS: Grade II,  ER+/AZ+, Ki67 5%; [id number].      A multidisciplinary plan has been formulated for the patient:    (1) Breast Surgical Oncology:  -Follow up [employer] 9 panel genetic testing. I will call her with results.   -S/p right breast wire localized lumpectomy 7/2022.     (2) Medical Oncology:  -Following with with Dr. Sharma. Plan for tamoxifen.     (3) Radiation Oncology:  -Appointment with Dr. Anderson 8/16/2022.     (4) Other:   -Due for screening colonoscopy. She will call to schedule.     Referring Provider: No ref. provider found    Chief Complaint: abnormal breast imaging    History of Present Illness: Ms. Radha Torre is a 47 y.o. year old lady, seen at the request of No ref. provider found for a new diagnosis of right breast cancer.      The patient presents to the clinic to discuss her yearly mammogram. She reports that she generally has yearly mammograms, but due to COVID-19 her last one was a little late.The patient denies every having a breast biopsy. Her work-up is detailed in the oncologic history below.     She had a precancerous mole on her breast and was seen by dermatology for this. The patient denies any lumps, bumps, skin changes, and nipple discharge.She reports a family history in her mother of breast cancer. Denies any family history of ovarian cancer.     She has not had a colonoscopy before.     8/8/2022 she presents today for follow-up.  She is doing very well with no complaints.  Her incisions are healing well.  She plans to start endocrine therapy after radiation.    Workup of Current Diagnosis:    3/7/2022 Bilateral Screening Mammogram:  Indeterminate left breast asymmetry. Recommend further evaluation with CC spot compression and lateral views with Tomosynthesis and possible ultrasound. Indeterminate right breast  calcifications. Recommend further evaluation  with CC and lateral spot magnification views.  BI-RADS Category 0: Incomplete     4/18/2022 Bilateral Breast Diagnostic Mammogram:   With additional imaging of the left breast, the area of focal asymmetry in the middle third slightly medial to the plane of the nipple effaces and has an appearance consistent with normal breast parenchyma.  Additional mammographic imaging of the right breast demonstrates presence of a cluster of microcalcifications in the middle third lower-inner quadrant of the right breast. The calcifications do not  demonstrate dependent layering on 90 degree lateral imaging.    IMPRESSION:  1. There is a cluster of microcalcifications in the middle third lower-inner quadrant of the right breast. Correlation with a stereotactic guided right breast biopsy is recommended.  2. There are no findings suspicious for malignancy in the left breast.  BI-RADS Category 0: Incomplete. Needs additional imaging evaluation.    5/13/2022 Right Breast Stereotactic Biopsy:   Tomosynthesis guided targeting on the calcifications in the lower inner quadrant of the right breast  was performed. Multiple tissue specimens were obtained. A specimen radiograph was obtained demonstrating microcalcifications within the specimen. A tri bell shaped metallic clip was placed to lola the site. Postbiopsy mammography of the right breast demonstrates placement of a metallic clip at the 4 o'clock position without evidence for clip migration or for a postbiopsyhematoma.  The pathology result has returned as DCIS. This is concordant with imaging findings.  IMPRESSION:  Technically successful Tomosynthesis guided Mammotome vacuum assisted right breast biopsy with placement of a tri bell shaped metallic clip. The pathology result has returned as DCIS. Surgical consultation and excision are recommended.  BI-RADS Category 6: Known malignancy.    5/13/2022 Pathology:   Final Diagnosis   1.  Breast, Right, 4:00 o'clock Biopsy:  Ductal carcinoma in-situ with               A. The ductal carcinoma in-situ is of intermediate nuclear grade with cribriform and micropapillary architecture and no necrosis.                 B. The ductal carcinoma in situ measures up to 7 mm on the slide.                 C. Microcalcifications are seen associated with the ductal carcinoma in situ      5/20/2022 Bilateral Breast MRI:   IMPRESSION AND RECOMMENDATION:    1.  A 2.1 cm biopsy cavity with two 1 cm focal areas of enhancement along the cavity margins represents the site of biopsy-proven malignancy. Surgical management is recommended with preoperative localization targeting the biopsy clip and residual calcifications, which measure up to at least 1.4 cm on the post biopsy mammogram.   2.  No MRI evidence of malignancy in the left breast.   BI-RADS Category 6: Known biopsy-proven malignancy    7/20/2022 Right breast wire bracketed lumpectomy  Final Diagnosis   1. Breast, Right, Lumpectomy (23 grams):               A. Ductal carcinoma in situ (DCIS), intermediate nuclear grade with solid, cribriform and micropapillary                     architecture measuring 10 mm maximally.               B. All margins are free of in situ carcinoma.               C. See synoptic template for complete tumor details and final margin measurements.                 2. Breast, Right Additional Posterior Margin, Inked and Oriented Excision:               A. Benign breast parenchyma.     3. Breast, Right Additional Medial Margin, Inked and Oriented Excision:               A. Benign breast parenchyma.     4. Breast, Right Additional Lateral Margin, Inked and Oriented Excision:               A. Benign breast parenchyma.     5. Breast, Right Additional Superior Margin, Inked and Oriented Excision:               A. Benign breast parenchyma.     6. Breast, Right Additional Inferior Margin, Inked and Oriented Excision:               A. Benign breast  parenchyma.     7. Breast, Left, Reduction Mammoplasty (99 grams):               A. Benign skin, subcutaneous tissue and breast parenchyma.     8. Breast, Right, Reduction Mammoplasty (76 grams):               A. Benign skin, subcutaneous tissue and breast parenchyma.       Gynecologic History:   G:3. P:3 AB:0  Age at first childbirth: 28-years-old  Lactation/How long: The patient did breast feed her three children.  Age at menarche: 14-years-old  Age at menopause: NA  Total years of oral contraceptive use: Has used oral contraception in the past.   Total years of hormone replacement therapy: None    Past Medical History:   She reports that she had palpitations as a side effect bupropion and now is on a beta-blocker.    Past Surgical History:    •  x3  • Endometrial ablation  • Skin lesion excisions  • Tubal ligation  • [place] tooth extraction    Family History:    • As above    Social History:  • Denies tobacco use  • Occasional alcohol use    Allergies:   Allergies   Allergen Reactions   • Bupropion Palpitations     POSSIBLE   • Adhesive Tape Rash     SWELLS. Localized if left on for extended periods of time       Medications:     Current Outpatient Medications:   •  ASPIRIN 81 PO, Take 81 mg by mouth Daily., Disp: , Rfl:   •  buPROPion XL (WELLBUTRIN XL) 150 MG 24 hr tablet, Take 75 mg by mouth Every Morning., Disp: , Rfl:   •  cetirizine (zyrTEC) 10 MG tablet, Take 10 mg by mouth Daily., Disp: , Rfl:   •  Cholecalciferol (Vitamin D High Potency) 25 MCG (1000 UT) capsule, Take 1,000 Units by mouth Daily., Disp: , Rfl:   •  escitalopram (LEXAPRO) 20 MG tablet, Take 1 tablet by mouth Daily., Disp: 90 tablet, Rfl: 0  •  fluticasone (FLONASE) 50 MCG/ACT nasal spray, 2 sprays into the nostril(s) as directed by provider Daily., Disp: , Rfl:   •  hydrOXYzine (ATARAX) 25 MG tablet, TAKE ONE TABLET BY MOUTH ONCE NIGHTLY AS NEEDED FOR ANXIETY (Patient taking differently: Take 25 mg by mouth At Night As Needed for  Anxiety (SLEEP).), Disp: 30 tablet, Rfl: 3  •  ipratropium-albuterol (DUO-NEB) 0.5-2.5 mg/3 ml nebulizer, Take 3 mL by nebulization Every 4 (Four) Hours As Needed for Wheezing or Shortness of Air., Disp: 360 mL, Rfl: 2  •  LORazepam (ATIVAN) 1 MG tablet, Take 1 tablet by mouth 2 (Two) Times a Day As Needed for Anxiety., Disp: 20 tablet, Rfl: 0  •  losartan (COZAAR) 50 MG tablet, Take 50 mg by mouth Daily., Disp: , Rfl:   •  metoprolol tartrate (LOPRESSOR) 50 MG tablet, Take 1 tablet by mouth 2 (Two) Times a Day., Disp: 180 tablet, Rfl: 1  •  multivitamin with minerals tablet tablet, Take 1 tablet by mouth Daily., Disp: , Rfl:   •  pravastatin (PRAVACHOL) 20 MG tablet, TAKE ONE TABLET BY MOUTH DAILY (Patient taking differently: Take 20 mg by mouth Every Night.), Disp: 30 tablet, Rfl: 3  •  promethazine (PHENERGAN) 25 MG tablet, , Disp: , Rfl:   •  tamoxifen (NOLVADEX) 20 MG chemo tablet, Take 1 tablet by mouth Daily for 30 days., Disp: 30 tablet, Rfl: 5    Laboratory Values:    Labs from 2022 reviewed    Review of Systems:   Influenza-like illness: no fever, no  cough, no  sore throat, no  body aches, no loss of sense of taste or smell, no known exposure to person with Covid-19.  Constitutional: Negative for fevers or chills  HENT: Negative for hearing loss or runny nose  Eyes: Negative for vision changes or scleral icterus  Respiratory: Negative for cough or shortness of breath  Cardiovascular: Negative for chest pain or heart palpitations  Gastrointestinal: Negative for abdominal pain, nausea, vomiting, constipation, melena, or hematochezia  Genitourinary: Negative for hematuria or dysuria  Musculoskeletal: Negative for joint swelling or gait instability  Neurologic: Negative for tremors or seizures  Psychiatric: Negative for suicidal ideations or depression  All other systems reviewed and negative    Physical Exam:   • ECO - Asymptomatic  • Constitutional: Well-developed well-nourished, no acute  distress  • Eyes: Conjunctiva normal, sclera nonicteric  • ENMT: Hearing grossly normal, oral mucosa moist  • Neck: Supple, no palpable mass, trachea midline  • Respiratory: Clear to auscultation, normal inspiratory effort  • Cardiovascular: Regular rate, no peripheral edema, no jugular venous distention  • Breast: symmetric  o Right: No visible abnormalities on inspection while seated, with arms raised or hands on hips. No masses, skin changes, or nipple abnormalities.  o Left: No visible abnormalities on inspection while seated, with arms raised or hands on hips. No masses, skin changes, or nipple abnormalities.  o Bilateral breast incisions clean and dry, no erythema or drainage, no hematoma or seroma.   o No clinical chest wall involvement.  • Gastrointestinal: Soft, nontender  • Lymphatics (palpable nodes): No cervical, supraclavicular or axillary lymphadenopathy  • Skin:  Warm, dry, no rash on visualized skin surfaces  • Musculoskeletal: Symmetric strength, normal gait  • Psychiatric: Alert and oriented ×3, normal affect     XAVIER LUX M.D.  General and Endoscopic Surgery  [facility]    [address] [place] [address], [address]  [place], [place], [address]  P: [phone]  F: [phone]

## 2022-08-16 ENCOUNTER — CONSULT (OUTPATIENT)
Dept: RADIATION ONCOLOGY | Facility: HOSPITAL | Age: 47
End: 2022-08-16

## 2022-08-16 ENCOUNTER — APPOINTMENT (OUTPATIENT)
Dept: RADIATION ONCOLOGY | Facility: HOSPITAL | Age: 47
End: 2022-08-16

## 2022-08-16 VITALS
DIASTOLIC BLOOD PRESSURE: 80 MMHG | SYSTOLIC BLOOD PRESSURE: 112 MMHG | OXYGEN SATURATION: 98 % | BODY MASS INDEX: 47.07 KG/M2 | HEART RATE: 91 BPM | WEIGHT: 293 LBS | RESPIRATION RATE: 20 BRPM

## 2022-08-16 DIAGNOSIS — D05.11 DUCTAL CARCINOMA IN SITU (DCIS) OF RIGHT BREAST: Primary | ICD-10-CM

## 2022-08-16 PROCEDURE — 99205 OFFICE O/P NEW HI 60 MIN: CPT | Performed by: RADIOLOGY

## 2022-08-16 PROCEDURE — G0463 HOSPITAL OUTPT CLINIC VISIT: HCPCS | Performed by: RADIOLOGY

## 2022-08-16 NOTE — PROGRESS NOTES
Subjective     Daisy Romero MD    Cancer Staging  No matching staging information was found for the patient.    CC: DCIS right breast, ER IA Positive                              Dear Daisy Romero MD    I had the pleasure of seeing Radha Torre  today in the [facility].   The patient is a 47 y.o. 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  She had a screening mammogram on 3/7/22 which showed an indeterminate left breast asymmetry and indeterminate calcification in the right breast.  She then had a right breast diagnostic mammogram and spot compression views of the left breast on 22 which showed a cluster of microcalcifications in the middle third lower inner quadrant  birads 4 and no suspicious findings in the right breast..  She had a stereotactic biopsy on 22 which revealed DCIS intermediate grade with cribriform and micropapillary architecture without necrosis, measuring up to 7mm, ER 81-90% IA 81-90%.      She had a breast mri on 22 which showed a 2cm biopsy cavity in the right breast with 2 1cm focal areas of enhancement along the cavity margins and no abnormality in the left breast.  She underwent a right breast lumpectomy and bilateral breast reduction on 22 with pathology revealing 10mm DCIS intermediate grade solid cribriform and micropapillary architecture, located 17mm from the closest superior and inferior margins.  ER 81-90% IA 81-90%.   Dr. Bhakta was her plastic surgeon.    She is  with menarche age 14, first childbirth age 28 and she did breast feed all 3 children.  She is premenopausal and has never used hormone replacement therapy.  She has a family hx of breast cancer in her mother age 74.  She had inviate genetic testing which was negative.      She met with Dr. Sharma with medical oncology on 22 and she recommended tamoxifen.    She is referred today for evaluation for adjuvant radiation.  She is recovering well from  her surgery.      Review of Systems   Constitutional: Positive for fatigue and unexpected weight change. Negative for activity change.   Eyes: Negative.    Respiratory: Positive for cough, shortness of breath and wheezing.    Cardiovascular: Positive for palpitations.   Gastrointestinal: Positive for blood in stool, constipation and rectal pain.   Genitourinary: Negative.    Musculoskeletal: Negative.    Skin: Negative.    Neurological: Positive for headaches.   Hematological: Negative.    Psychiatric/Behavioral: The patient is nervous/anxious.          Past Medical History:   Diagnosis Date   • Abnormal ECG    • Acid reflux     ON OCC   • Anesthesia complication     BP DROPPED DUE TO EPIDURAL FROM C-SECTIONS   • Anxiety    • Asthma 2017    Occasional reactive asthma/bronchitis after respiratory illness   • At risk for sleep apnea     STOP BANG 5   • Breast cancer (HCC) 05/13/2022    Right breast ductal carcinoma in situ, ER/OK positive, Ki-67 5%   • Depression 2004    Postpartum with 3 births   • Deviated septum    • Fatigue    • Fissure, anal    • History of UTI    • Hyperlipidemia    • Hypertension 2017    noted at last few urgent care visits   • Insomnia    • Migraine    • Obesity    • Pain in elbow joint     bilateral   • Rectal bleeding     RELATED TO RECTAL FISSUE   • Seasonal allergic rhinitis          Past Surgical History:   Procedure Laterality Date   • BREAST BIOPSY Right 05/16/2022    Tomosynthesis guided Mammotome vacuum assisted right breast 4 o'clock position-Dr. Desmond Zamarripa, [facility]   • BREAST LUMPECTOMY Right 7/20/2022    Procedure: RIGHT BREAST WIRE LOCALIZED LUMPECTOMY FOR DCIS;  Surgeon: Daisy Romero MD;  Location: [facility];  Service: General;  Laterality: Right;   • BREAST SURGERY Bilateral 7/20/2022    Procedure: RIGHT BREAST ONCOPLASTIC CLOSURE LEFT BREST REDUCTION FOR SYMMETRY;  Surgeon: Rebecca Bhakta MD;  Location: [facility];  Service: Plastics;  Laterality: Bilateral;    •  SECTION N/A     x 3   • D & C HYSTEROSCOPY ENDOMETRIAL ABLATION N/A 2018    Procedure: DILATATION AND CURETTAGE HYSTEROSCOPY NOVASURE ENDOMETRIAL ABLATION;  Surgeon: Puneet Carreno MD;  Location[facility];  Service: Obstetrics/Gynecology   • MOHS SURGERY Left     Left Elbow & Left Scalp   • SKIN LESION EXCISION Right     Right Breast, Benign   • TUBAL ABDOMINAL LIGATION Bilateral 2013    after last    • WISDOM TOOTH EXTRACTION Bilateral          Social History     Socioeconomic History   • Marital status:    Tobacco Use   • Smoking status: Former Smoker     Packs/day: 1.00     Years: 5.00     Pack years: 5.00     Types: Cigarettes, Cigarettes     Quit date: 2001     Years since quittin.2   • Smokeless tobacco: Never Used   Vaping Use   • Vaping Use: Never used   Substance and Sexual Activity   • Alcohol use: Yes     Alcohol/week: 2.0 standard drinks     Types: 2 Drinks containing 0.5 oz of alcohol per week     Comment: Socially; few times a month   • Drug use: Never   • Sexual activity: Yes     Partners: Male     Birth control/protection: Surgical         Family History   Problem Relation Age of Onset   • Anxiety disorder Mother    • Depression Mother    • Stroke Mother    • Skin cancer Mother         BCE SCE   • Colon polyps Mother    • Breast cancer Mother 74        DCIS with invasive. DCIS Left Breast 2:30 position ER/CA +, Her2-. Left Breast UOQ Invasive DCIS is multifocal, Two benign sentinel lymph nodes, Pathologic stage: pT1a, N(sn)0.   • Arthritis Mother    • Cancer Mother         DCIS and invasive   • Alcohol abuse Brother    • Autism Son    • Learning disabilities Son         Autism   • Depression Son    • Depression Maternal Grandmother    • Anxiety disorder Maternal Grandmother    • Hyperlipidemia Maternal Grandmother    • Hypertension Maternal Grandmother    • Heart disease Maternal Grandmother    • Arthritis Maternal Grandmother    • Stroke  Maternal Grandmother    • Coronary artery disease Maternal Grandfather    • Esophageal cancer Paternal Grandfather 85   • Colon cancer Neg Hx    • Malig Hyperthermia Neg Hx           Objective    Physical Exam  Constitutional:       Appearance: Normal appearance. She is obese.   HENT:      Head: Atraumatic.   Eyes:      Extraocular Movements: Extraocular movements intact.   Pulmonary:      Effort: Pulmonary effort is normal.   Chest:          Comments: Bilateral incisions healing well, small 2mm open area mid inframmary incision  Musculoskeletal:         General: Normal range of motion.   Neurological:      General: No focal deficit present.   Psychiatric:         Mood and Affect: Mood normal.         Behavior: Behavior normal.           Current Outpatient Medications on File Prior to Visit   Medication Sig Dispense Refill   • ASPIRIN 81 PO Take 81 mg by mouth Daily.     • buPROPion XL (WELLBUTRIN XL) 150 MG 24 hr tablet Take 75 mg by mouth Every Morning.     • cetirizine (zyrTEC) 10 MG tablet Take 10 mg by mouth Daily.     • Cholecalciferol (Vitamin D High Potency) 25 MCG (1000 UT) capsule Take 1,000 Units by mouth Daily.     • escitalopram (LEXAPRO) 20 MG tablet Take 1 tablet by mouth Daily. 90 tablet 0   • fluticasone (FLONASE) 50 MCG/ACT nasal spray 2 sprays into the nostril(s) as directed by provider Daily.     • hydrOXYzine (ATARAX) 25 MG tablet TAKE ONE TABLET BY MOUTH ONCE NIGHTLY AS NEEDED FOR ANXIETY (Patient taking differently: Take 25 mg by mouth At Night As Needed for Anxiety (SLEEP).) 30 tablet 3   • ipratropium-albuterol (DUO-NEB) 0.5-2.5 mg/3 ml nebulizer Take 3 mL by nebulization Every 4 (Four) Hours As Needed for Wheezing or Shortness of Air. 360 mL 2   • LORazepam (ATIVAN) 1 MG tablet Take 1 tablet by mouth 2 (Two) Times a Day As Needed for Anxiety. 20 tablet 0   • losartan (COZAAR) 50 MG tablet Take 50 mg by mouth Daily.     • metoprolol tartrate (LOPRESSOR) 50 MG tablet Take 1 tablet by mouth 2  (Two) Times a Day. 180 tablet 1   • multivitamin with minerals tablet tablet Take 1 tablet by mouth Daily.     • pravastatin (PRAVACHOL) 20 MG tablet TAKE ONE TABLET BY MOUTH DAILY (Patient taking differently: Take 20 mg by mouth Every Night.) 30 tablet 3   • promethazine (PHENERGAN) 25 MG tablet      • tamoxifen (NOLVADEX) 20 MG chemo tablet Take 1 tablet by mouth Daily for 30 days. 30 tablet 5   • [DISCONTINUED] Fluticasone Furoate-Vilanterol (Breo Ellipta) 200-25 MCG/INH inhaler Inhale 1 puff Daily. 1 each 0     No current facility-administered medications on file prior to visit.       ALLERGIES:    Allergies   Allergen Reactions   • Bupropion Palpitations     POSSIBLE   • Adhesive Tape Rash     SWELLS. Localized if left on for extended periods of time       There were no vitals taken for this visit.     (0) Fully active, able to carry on all predisease performance without restriction  No flowsheet data found.      Assessment & Plan     47 year old female with DCIS right breast, ER ID Positive with widely negative margins.  I discussed with her my recommendation for post-operative radiation therapy to the right breast to decrease the risk of local recurrence.      I discussed with her in detail the risks, benefits and rationale of radiation therapy to the right breast to include but not limited to the following:    Acute: skin erythema, breakdown/moist desquamation, swelling or discomfort of the breast, fatigue, pneumonitis resulting in shortness of breath, cough or pain    Late: Permanent skin changes including hyperpigmentation, telangiectasias, fibrosis of the breast resulting in smaller size or poor cosmetic outcome, late edema or cellulitis, late rib fracture, late pulmonary fibrosis and the remote risk of second malignancies.      She voiced understanding and was given an opportunity to ask questions which I believe were answered to her satisfaction.  I have scheduled her to return on Thursday, August 18  for re-evaluation and CT simulation for treatment planning.  I plan to treat the right breast with tangential fields to a dose of approximately 4256cGy in 16 fractions with no boost.    I personally spent greater than 60 minutes today assessing, managing, discussing and documenting my visit with the patient. That time includes review of records, imaging and pathology reports, obtaining my own history, performing a medically appropriate evaluation, counseling and educating the patient, discussing goals, logistics, alternatives and risks of my recommendations, surveillance options and potential outcomes. It also includes the time documenting the clinical information in the EMR and communicating my recommendations to the other involved physicians.                     Thank you very much for allowing me to participate in the care of this very pleasant patient.    Sincerely,      Yary Anderson MD     Subjective     Daisy Romero MD    Cancer Staging

## 2022-08-18 ENCOUNTER — OFFICE VISIT (OUTPATIENT)
Dept: RADIATION ONCOLOGY | Facility: HOSPITAL | Age: 47
End: 2022-08-18

## 2022-08-18 VITALS
BODY MASS INDEX: 46.94 KG/M2 | OXYGEN SATURATION: 97 % | DIASTOLIC BLOOD PRESSURE: 93 MMHG | HEART RATE: 68 BPM | WEIGHT: 293 LBS | SYSTOLIC BLOOD PRESSURE: 135 MMHG

## 2022-08-18 DIAGNOSIS — D05.11 DUCTAL CARCINOMA IN SITU (DCIS) OF RIGHT BREAST: Primary | ICD-10-CM

## 2022-08-18 DIAGNOSIS — D05.11 DUCTAL CARCINOMA IN SITU (DCIS) OF RIGHT BREAST: ICD-10-CM

## 2022-08-18 PROCEDURE — 77333 RADIATION TREATMENT AID(S): CPT | Performed by: RADIOLOGY

## 2022-08-18 PROCEDURE — 77290 THER RAD SIMULAJ FIELD CPLX: CPT | Performed by: RADIOLOGY

## 2022-08-18 PROCEDURE — 77263 THER RADIOLOGY TX PLNG CPLX: CPT | Performed by: RADIOLOGY

## 2022-08-18 PROCEDURE — 99214 OFFICE O/P EST MOD 30 MIN: CPT | Performed by: RADIOLOGY

## 2022-08-18 RX ORDER — LORAZEPAM 1 MG/1
1 TABLET ORAL 2 TIMES DAILY PRN
Qty: 40 TABLET | Refills: 0 | Status: SHIPPED | OUTPATIENT
Start: 2022-08-18 | End: 2022-09-08

## 2022-08-18 NOTE — PROGRESS NOTES
Subjective     Yary Anderson MD    Cancer Staging  No matching staging information was found for the patient.   No chief complaint on file.     CC: DCIS right breast, ER NE Positive                              Dear Daisy Romero MD     I had the pleasure of seeing Radha Torre  [facility].   The patient is a 47 y.o. 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  She had a screening mammogram on 3/7/22 which showed an indeterminate left breast asymmetry and indeterminate calcification in the right breast.  She then had a right breast diagnostic mammogram and spot compression views of the left breast on 22 which showed a cluster of microcalcifications in the middle third lower inner quadrant  birads 4 and no suspicious findings in the right breast..  She had a stereotactic biopsy on 22 which revealed DCIS intermediate grade with cribriform and micropapillary architecture without necrosis, measuring up to 7mm, ER 81-90% NE 81-90%.       She had a breast mri on 22 which showed a 2cm biopsy cavity in the right breast with 2 1cm focal areas of enhancement along the cavity margins and no abnormality in the left breast.  She underwent a right breast lumpectomy and bilateral breast reduction on 22 with pathology revealing 10mm DCIS intermediate grade solid cribriform and micropapillary architecture, located 17mm from the closest superior and inferior margins.  ER 81-90% NE 81-90%.   Dr. Bhakta was her plastic surgeon.     She is  with menarche age 14, first childbirth age 28 and she did breast feed all 3 children.  She is premenopausal and has never used hormone replacement therapy.  She has a family hx of breast cancer in her mother age 74.  She had inviate genetic testing which was negative.       She met with Dr. Sharma with medical oncology on 22 and she recommended tamoxifen.     She is referred today for evaluation for adjuvant  radiation.  She is recovering well from her surgery.    Interval hx 22:  She returns today for re-evaluation and treatment planning.  She has no new complaints.    Review of Systems   Constitutional: Negative.    Musculoskeletal: Negative.    Skin: Negative.    Psychiatric/Behavioral: Negative.          Past Medical History:   Diagnosis Date   • Abnormal ECG    • Acid reflux     ON OCC   • Anesthesia complication     BP DROPPED DUE TO EPIDURAL FROM C-SECTIONS   • Anxiety    • Asthma 2017    Occasional reactive asthma/bronchitis after respiratory illness   • At risk for sleep apnea     STOP BANG 5   • Breast cancer (HCC) 2022    Right breast ductal carcinoma in situ, ER/OK positive, Ki-67 5%   • Depression     Postpartum with 3 births   • Deviated septum    • Fatigue    • Fissure, anal    • History of UTI    • Hyperlipidemia    • Hypertension 2017    noted at last few urgent care visits   • Insomnia    • Migraine    • Obesity    • Pain in elbow joint     bilateral   • Rectal bleeding     RELATED TO RECTAL FISSUE   • Seasonal allergic rhinitis          Past Surgical History:   Procedure Laterality Date   • BREAST BIOPSY Right 2022    Tomosynthesis guided Mammotome vacuum assisted right breast 4 o'clock position-Dr. Desmond Zamarripa, [facility]   • BREAST LUMPECTOMY Right 2022    Procedure: RIGHT BREAST WIRE LOCALIZED LUMPECTOMY FOR DCIS;  Surgeon: Daisy Romero MD;  Location: [facility];  Service: General;  Laterality: Right;   • BREAST SURGERY Bilateral 2022    Procedure: RIGHT BREAST ONCOPLASTIC CLOSURE LEFT BREST REDUCTION FOR SYMMETRY;  Surgeon: Rebecca Bhakta MD;  Location: [facility];  Service: Plastics;  Laterality: Bilateral;   •  SECTION N/A     x 3   • D & C HYSTEROSCOPY ENDOMETRIAL ABLATION N/A 2018    Procedure: DILATATION AND CURETTAGE HYSTEROSCOPY NOVASURE ENDOMETRIAL ABLATION;  Surgeon: Puneet Carreno MD;  Location: [facility], [place];  Service:  Obstetrics/Gynecology   • MOHS SURGERY Left     Left Elbow & Left Scalp   • SKIN LESION EXCISION Right     Right Breast, Benign   • TUBAL ABDOMINAL LIGATION Bilateral 2013    after last    • WISDOM TOOTH EXTRACTION Bilateral          Social History     Socioeconomic History   • Marital status:    Tobacco Use   • Smoking status: Former Smoker     Packs/day: 1.00     Years: 5.00     Pack years: 5.00     Types: Cigarettes, Cigarettes     Quit date: 2001     Years since quittin.2   • Smokeless tobacco: Never Used   Vaping Use   • Vaping Use: Never used   Substance and Sexual Activity   • Alcohol use: Yes     Alcohol/week: 2.0 standard drinks     Types: 2 Drinks containing 0.5 oz of alcohol per week     Comment: Socially; few times a month   • Drug use: Never   • Sexual activity: Yes     Partners: Male     Birth control/protection: Surgical         Family History   Problem Relation Age of Onset   • Anxiety disorder Mother    • Depression Mother    • Stroke Mother    • Skin cancer Mother         BCE SCE   • Colon polyps Mother    • Breast cancer Mother 74        DCIS with invasive. DCIS Left Breast 2:30 position ER/LA +, Her2-. Left Breast UOQ Invasive DCIS is multifocal, Two benign sentinel lymph nodes, Pathologic stage: pT1a, N(sn)0.   • Arthritis Mother    • Cancer Mother         DCIS and invasive   • Alcohol abuse Brother    • Autism Son    • Learning disabilities Son         Autism   • Depression Son    • Depression Maternal Grandmother    • Anxiety disorder Maternal Grandmother    • Hyperlipidemia Maternal Grandmother    • Hypertension Maternal Grandmother    • Heart disease Maternal Grandmother    • Arthritis Maternal Grandmother    • Stroke Maternal Grandmother    • Coronary artery disease Maternal Grandfather    • Esophageal cancer Paternal Grandfather 85   • Colon cancer Neg Hx    • Malig Hyperthermia Neg Hx           Objective    Physical Exam  Constitutional:       Appearance:  Normal appearance.   Chest:          Comments: Incisions healing well, no palpable masses  Neurological:      Mental Status: She is alert.           Current Outpatient Medications on File Prior to Visit   Medication Sig Dispense Refill   • ASPIRIN 81 PO Take 81 mg by mouth Daily.     • buPROPion XL (WELLBUTRIN XL) 150 MG 24 hr tablet Take 75 mg by mouth Every Morning.     • cetirizine (zyrTEC) 10 MG tablet Take 10 mg by mouth Daily.     • Cholecalciferol (Vitamin D High Potency) 25 MCG (1000 UT) capsule Take 1,000 Units by mouth Daily.     • escitalopram (LEXAPRO) 20 MG tablet Take 1 tablet by mouth Daily. 90 tablet 0   • fluticasone (FLONASE) 50 MCG/ACT nasal spray 2 sprays into the nostril(s) as directed by provider Daily.     • hydrOXYzine (ATARAX) 25 MG tablet TAKE ONE TABLET BY MOUTH ONCE NIGHTLY AS NEEDED FOR ANXIETY (Patient taking differently: Take 25 mg by mouth At Night As Needed for Anxiety (SLEEP).) 30 tablet 3   • ipratropium-albuterol (DUO-NEB) 0.5-2.5 mg/3 ml nebulizer Take 3 mL by nebulization Every 4 (Four) Hours As Needed for Wheezing or Shortness of Air. 360 mL 2   • LORazepam (ATIVAN) 1 MG tablet Take 1 tablet by mouth 2 (Two) Times a Day As Needed for Anxiety. 20 tablet 0   • losartan (COZAAR) 50 MG tablet Take 50 mg by mouth Daily.     • metoprolol tartrate (LOPRESSOR) 50 MG tablet Take 1 tablet by mouth 2 (Two) Times a Day. 180 tablet 1   • multivitamin with minerals tablet tablet Take 1 tablet by mouth Daily.     • pravastatin (PRAVACHOL) 20 MG tablet TAKE ONE TABLET BY MOUTH DAILY (Patient taking differently: Take 20 mg by mouth Every Night.) 30 tablet 3   • promethazine (PHENERGAN) 25 MG tablet      • tamoxifen (NOLVADEX) 20 MG chemo tablet Take 1 tablet by mouth Daily for 30 days. 30 tablet 5   • [DISCONTINUED] Fluticasone Furoate-Vilanterol (Breo Ellipta) 200-25 MCG/INH inhaler Inhale 1 puff Daily. 1 each 0     No current facility-administered medications on file prior to visit.        ALLERGIES:    Allergies   Allergen Reactions   • Bupropion Palpitations     POSSIBLE   • Adhesive Tape Rash     SWELLS. Localized if left on for extended periods of time       There were no vitals taken for this visit.     No flowsheet data found.      Assessment & Plan     47 year old female with DCIS right breast, ER FL Positive with widely negative margins. I reviewed with her again in detail the risks, benefits and rationale of radiation therapy to the right breast to include but not limited to the following:     Acute: skin erythema, breakdown/moist desquamation, swelling or discomfort of the breast, fatigue, pneumonitis resulting in shortness of breath, cough or pain     Late: Permanent skin changes including hyperpigmentation, telangiectasias, fibrosis of the breast resulting in smaller size or poor cosmetic outcome, late edema or cellulitis, late rib fracture, late pulmonary fibrosis and the remote risk of second malignancies.       She voiced understanding and was given an opportunity to ask questions which I believe were answered to her satisfaction. we will proceed with CT simulation for treatment planning today and begin her treatments Monday, August 29.   I plan to treat the right breast with tangential fields to a dose of approximately 4256cGy in 16 fractions with no boost.     I personally spent greater than 15 minutes today assessing, managing, discussing and documenting my visit with the patient. That time includes review of records, imaging and pathology reports, obtaining my own history, performing a medically appropriate evaluation, counseling and educating the patient, discussing goals, logistics, alternatives and risks of my recommendations, surveillance options and potential outcomes. It also includes the time documenting the clinical information in the EMR and communicating my recommendations to the other involved physicians.                Thank you very much for allowing me to  participate in the care of this very pleasant patient.    Sincerely,      Yary Anderson MD     Subjective     Yary Anderson MD    Cancer Staging

## 2022-08-20 DIAGNOSIS — F33.1 MAJOR DEPRESSIVE DISORDER, RECURRENT EPISODE, MODERATE: Chronic | ICD-10-CM

## 2022-08-20 DIAGNOSIS — F41.1 GENERALIZED ANXIETY DISORDER: Chronic | ICD-10-CM

## 2022-08-22 RX ORDER — ESCITALOPRAM OXALATE 20 MG/1
TABLET ORAL
Qty: 30 TABLET | Refills: 0 | Status: SHIPPED | OUTPATIENT
Start: 2022-08-22 | End: 2022-09-27 | Stop reason: SDUPTHER

## 2022-08-22 NOTE — TELEPHONE ENCOUNTER
I am covering a refill for the patient's provider who is out of the office today, but it looks like the patient has not been seen in several months.  Can someone reach out to the patient to get them rescheduled with ALEXIS Vergara?  Thanks.

## 2022-08-25 PROCEDURE — 77300 RADIATION THERAPY DOSE PLAN: CPT | Performed by: RADIOLOGY

## 2022-08-25 PROCEDURE — 77334 RADIATION TREATMENT AID(S): CPT | Performed by: RADIOLOGY

## 2022-08-25 PROCEDURE — 77295 3-D RADIOTHERAPY PLAN: CPT | Performed by: RADIOLOGY

## 2022-08-29 ENCOUNTER — RADIATION ONCOLOGY WEEKLY ASSESSMENT (OUTPATIENT)
Dept: RADIATION ONCOLOGY | Facility: HOSPITAL | Age: 47
End: 2022-08-29

## 2022-08-29 VITALS
OXYGEN SATURATION: 97 % | DIASTOLIC BLOOD PRESSURE: 71 MMHG | WEIGHT: 293 LBS | BODY MASS INDEX: 46.35 KG/M2 | SYSTOLIC BLOOD PRESSURE: 105 MMHG | HEART RATE: 80 BPM

## 2022-08-29 DIAGNOSIS — D05.11 DUCTAL CARCINOMA IN SITU (DCIS) OF RIGHT BREAST: Primary | ICD-10-CM

## 2022-08-29 NOTE — PROGRESS NOTES
Physician Weekly Management Note    Diagnosis:     Diagnosis Plan   1. Ductal carcinoma in situ (DCIS) of right breast         RT Details:  fx 0 / 16 right breast    Notes on Treatment course, Films, Medical progress:  I wanted to see Radha prior to her first treatment for skin check.  She still has a small 1-2mm open area in mid inframmary incision, getting smaller but not totally closed.  I will hold off onstarting her treatment until next Tuesday, sept 6, I will see her prior to tx to check incision    Weekly Management:  Medication reviewed?   Yes  New medications given?   No  Problemlist reviewed?   Yes  Problem added?   No  Issues raised requiring referral to support services - task assigned to:  na    Technical aspects reviewed:  Weekly OBI approved?   Yes  Weekly port films approved?   Yes  Change requests noted on port film?   No  Patient setup and plan reviewed?   Yes    Chart Reviewed:  Continue current treatment plan?   Yes  Treatment plan change requested?   No  CBC reviewed?   Yes  Concurrent Chemo?   No    Objective     Toxicities:   none     Review of Systems   Constitutional: Negative.    Skin: Positive for wound.   Psychiatric/Behavioral: Negative.           Vitals:    08/29/22 0959   BP: 105/71   Pulse: 80   SpO2: 97%       No flowsheet data found.    Physical Exam  Constitutional:       Appearance: Normal appearance.   Chest:          Comments: Small 1-2 mm open area inframmary midline incision, no erythema or sign of infection  Neurological:      Mental Status: She is alert.             Problem Summary List    Diagnosis:     Diagnosis Plan   1. Ductal carcinoma in situ (DCIS) of right breast       Pathology:   breast    Past Medical History:   Diagnosis Date   • Abnormal ECG    • Acid reflux     ON OCC   • Anesthesia complication     BP DROPPED DUE TO EPIDURAL FROM C-SECTIONS   • Anxiety    • Asthma 2017    Occasional reactive asthma/bronchitis after respiratory illness   • At risk for sleep  apnea     STOP BANG 5   • Breast cancer (HCC) 2022    Right breast ductal carcinoma in situ, ER/PA positive, Ki-67 5%   • Depression 2004    Postpartum with 3 births   • Deviated septum    • Fatigue    • Fissure, anal    • History of UTI    • Hyperlipidemia    • Hypertension 2017    noted at last few urgent care visits   • Insomnia    • Migraine    • Obesity    • Pain in elbow joint     bilateral   • Rectal bleeding     RELATED TO RECTAL FISSUE   • Seasonal allergic rhinitis          Past Surgical History:   Procedure Laterality Date   • BREAST BIOPSY Right 2022    Tomosynthesis guided Mammotome vacuum assisted right breast 4 o'clock position-Dr. Desmond Zamarripa, [facility]   • BREAST LUMPECTOMY Right 2022    Procedure: RIGHT BREAST WIRE LOCALIZED LUMPECTOMY FOR DCIS;  Surgeon: Daisy Romero MD;  Location: [facility];  Service: General;  Laterality: Right;   • BREAST SURGERY Bilateral 2022    Procedure: RIGHT BREAST ONCOPLASTIC CLOSURE LEFT BREST REDUCTION FOR SYMMETRY;  Surgeon: Rebecca Bhakta MD;  Location: [facility];  Service: Plastics;  Laterality: Bilateral;   •  SECTION N/A     x 3   • D & C HYSTEROSCOPY ENDOMETRIAL ABLATION N/A 2018    Procedure: DILATATION AND CURETTAGE HYSTEROSCOPY NOVASURE ENDOMETRIAL ABLATION;  Surgeon: Puneet Carreno MD;  Location: [facility];  Service: Obstetrics/Gynecology   • MOHS SURGERY Left     Left Elbow & Left Scalp   • SKIN LESION EXCISION Right     Right Breast, Benign   • TUBAL ABDOMINAL LIGATION Bilateral 2013    after last    • WISDOM TOOTH EXTRACTION Bilateral          Current Outpatient Medications on File Prior to Visit   Medication Sig Dispense Refill   • ASPIRIN 81 PO Take 81 mg by mouth Daily.     • buPROPion XL (WELLBUTRIN XL) 150 MG 24 hr tablet Take 75 mg by mouth Every Morning.     • cetirizine (zyrTEC) 10 MG tablet Take 10 mg by mouth Daily.     • Cholecalciferol (Vitamin D High Potency) 25 MCG (1000  UT) capsule Take 1,000 Units by mouth Daily.     • escitalopram (LEXAPRO) 20 MG tablet TAKE ONE TABLET BY MOUTH DAILY 30 tablet 0   • fluticasone (FLONASE) 50 MCG/ACT nasal spray 2 sprays into the nostril(s) as directed by provider Daily.     • hydrOXYzine (ATARAX) 25 MG tablet TAKE ONE TABLET BY MOUTH ONCE NIGHTLY AS NEEDED FOR ANXIETY (Patient taking differently: Take 25 mg by mouth At Night As Needed for Anxiety (SLEEP).) 30 tablet 3   • ipratropium-albuterol (DUO-NEB) 0.5-2.5 mg/3 ml nebulizer Take 3 mL by nebulization Every 4 (Four) Hours As Needed for Wheezing or Shortness of Air. 360 mL 2   • LORazepam (ATIVAN) 1 MG tablet Take 1 tablet by mouth 2 (Two) Times a Day As Needed for Anxiety. 40 tablet 0   • losartan (COZAAR) 50 MG tablet Take 50 mg by mouth Daily.     • metoprolol tartrate (LOPRESSOR) 50 MG tablet Take 1 tablet by mouth 2 (Two) Times a Day. 180 tablet 1   • multivitamin with minerals tablet tablet Take 1 tablet by mouth Daily.     • pravastatin (PRAVACHOL) 20 MG tablet TAKE ONE TABLET BY MOUTH DAILY (Patient taking differently: Take 20 mg by mouth Every Night.) 30 tablet 3   • promethazine (PHENERGAN) 25 MG tablet      • tamoxifen (NOLVADEX) 20 MG chemo tablet Take 1 tablet by mouth Daily for 30 days. 30 tablet 5   • [DISCONTINUED] Fluticasone Furoate-Vilanterol (Breo Ellipta) 200-25 MCG/INH inhaler Inhale 1 puff Daily. 1 each 0     No current facility-administered medications on file prior to visit.       Allergies   Allergen Reactions   • Bupropion Palpitations     POSSIBLE   • Adhesive Tape Rash     SWELLS. Localized if left on for extended periods of time         Referring Provider:    No referring provider defined for this encounter.    Oncologist:  No primary care provider on file.      Seen and approved by:  Yary Anderson MD  08/29/2022  Subjective     No ref. provider found    .

## 2022-09-06 ENCOUNTER — OFFICE VISIT (OUTPATIENT)
Dept: RADIATION ONCOLOGY | Facility: HOSPITAL | Age: 47
End: 2022-09-06

## 2022-09-06 ENCOUNTER — APPOINTMENT (OUTPATIENT)
Dept: RADIATION ONCOLOGY | Facility: HOSPITAL | Age: 47
End: 2022-09-06

## 2022-09-06 VITALS
RESPIRATION RATE: 20 BRPM | SYSTOLIC BLOOD PRESSURE: 110 MMHG | OXYGEN SATURATION: 100 % | BODY MASS INDEX: 46.32 KG/M2 | HEART RATE: 98 BPM | DIASTOLIC BLOOD PRESSURE: 81 MMHG | WEIGHT: 293 LBS

## 2022-09-06 DIAGNOSIS — D05.11 DUCTAL CARCINOMA IN SITU (DCIS) OF RIGHT BREAST: Primary | ICD-10-CM

## 2022-09-06 LAB
RAD ONC ARIA COURSE ID: NORMAL
RAD ONC ARIA COURSE INTENT: NORMAL
RAD ONC ARIA COURSE LAST TREATMENT DATE: NORMAL
RAD ONC ARIA COURSE START DATE: NORMAL
RAD ONC ARIA COURSE TREATMENT ELAPSED DAYS: 0
RAD ONC ARIA FIRST TREATMENT DATE: NORMAL
RAD ONC ARIA PLAN FRACTIONS TREATED TO DATE: 1
RAD ONC ARIA PLAN ID: NORMAL
RAD ONC ARIA PLAN PRESCRIBED DOSE PER FRACTION: 2.66 GY
RAD ONC ARIA PLAN PRIMARY REFERENCE POINT: NORMAL
RAD ONC ARIA PLAN TOTAL FRACTIONS PRESCRIBED: 16
RAD ONC ARIA PLAN TOTAL PRESCRIBED DOSE: 4256 CGY
RAD ONC ARIA REFERENCE POINT DOSAGE GIVEN TO DATE: 2.53 GY
RAD ONC ARIA REFERENCE POINT DOSAGE GIVEN TO DATE: 2.58 GY
RAD ONC ARIA REFERENCE POINT DOSAGE GIVEN TO DATE: 2.66 GY
RAD ONC ARIA REFERENCE POINT ID: NORMAL
RAD ONC ARIA REFERENCE POINT SESSION DOSAGE GIVEN: 2.53 GY
RAD ONC ARIA REFERENCE POINT SESSION DOSAGE GIVEN: 2.58 GY
RAD ONC ARIA REFERENCE POINT SESSION DOSAGE GIVEN: 2.66 GY

## 2022-09-06 PROCEDURE — 77427 RADIATION TX MANAGEMENT X5: CPT | Performed by: RADIOLOGY

## 2022-09-06 PROCEDURE — FACE2FACE: Performed by: RADIOLOGY

## 2022-09-06 PROCEDURE — 77412 RADIATION TX DELIVERY LVL 3: CPT | Performed by: RADIOLOGY

## 2022-09-06 PROCEDURE — 77280 THER RAD SIMULAJ FIELD SMPL: CPT | Performed by: RADIOLOGY

## 2022-09-06 NOTE — PROGRESS NOTES
Alison mccauley Weekly Management Note    Diagnosis:     Diagnosis Plan   1. Ductal carcinoma in situ (DCIS) of right breast         RT Details:  fx 0/16 right breast     Notes on Treatment course, Films, Medical progress:   I wanted to see Radha prior to her first treatment for skin check.  incision and wound completelyhealed, ok to start, kps 90%    Weekly Management:  Medication reviewed?   Yes  New medications given?   No  Problemlist reviewed?   Yes  Problem added?   No  Issues raised requiring referral to support services - task assigned to:  pamella    Technical aspects reviewed:  Weekly OBI approved?   Yes  Weekly port films approved?   Yes  Change requests noted on port film?   No  Patient setup and plan reviewed?   Yes    Chart Reviewed:  Continue current treatment plan?   Yes  Treatment plan change requested?   No  CBC reviewed?   Yes  Concurrent Chemo?   No    Objective     Toxicities:   none     Review of Systems   Constitutional: Negative.    Skin: Negative.           There were no vitals filed for this visit.    No flowsheet data found.    Physical Exam  Chest:          Comments: Incisions well healed  Neurological:      Mental Status: She is alert.             Problem Summary List    Diagnosis:     Diagnosis Plan   1. Ductal carcinoma in situ (DCIS) of right breast       Pathology:   Breast     Past Medical History:   Diagnosis Date   • Abnormal ECG    • Acid reflux     ON OCC   • Anesthesia complication     BP DROPPED DUE TO EPIDURAL FROM C-SECTIONS   • Anxiety    • Asthma 2017    Occasional reactive asthma/bronchitis after respiratory illness   • At risk for sleep apnea     STOP BANG 5   • Breast cancer (HCC) 05/13/2022    Right breast ductal carcinoma in situ, ER/VT positive, Ki-67 5%   • Depression 2004    Postpartum with 3 births   • Deviated septum    • Fatigue    • Fissure, anal    • History of UTI    • Hyperlipidemia    • Hypertension 2017    noted at last few urgent care visits   •  Insomnia    • Migraine    • Obesity    • Pain in elbow joint     bilateral   • Rectal bleeding     RELATED TO RECTAL FISSUE   • Seasonal allergic rhinitis          Past Surgical History:   Procedure Laterality Date   • BREAST BIOPSY Right 2022    Tomosynthesis guided Mammotome vacuum assisted right breast 4 o'clock position-Dr. Desmond Zamarripa, [facility]   • BREAST LUMPECTOMY Right 2022    Procedure: RIGHT BREAST WIRE LOCALIZED LUMPECTOMY FOR DCIS;  Surgeon: Daisy Romero MD;  Location: [facility];  Service: General;  Laterality: Right;   • BREAST SURGERY Bilateral 2022    Procedure: RIGHT BREAST ONCOPLASTIC CLOSURE LEFT BREST REDUCTION FOR SYMMETRY;  Surgeon: Rebecca Bhakta MD;  Location: [facility] OR;  Service: Plastics;  Laterality: Bilateral;   •  SECTION N/A     x 3   • D & C HYSTEROSCOPY ENDOMETRIAL ABLATION N/A 2018    Procedure: DILATATION AND CURETTAGE HYSTEROSCOPY NOVASURE ENDOMETRIAL ABLATION;  Surgeon: Puneet Carreno MD;  Location: [facility];  Service: Obstetrics/Gynecology   • MOHS SURGERY Left     Left Elbow & Left Scalp   • SKIN LESION EXCISION Right     Right Breast, Benign   • TUBAL ABDOMINAL LIGATION Bilateral 2013    after last    • WISDOM TOOTH EXTRACTION Bilateral          Current Outpatient Medications on File Prior to Visit   Medication Sig Dispense Refill   • ASPIRIN 81 PO Take 81 mg by mouth Daily.     • buPROPion XL (WELLBUTRIN XL) 150 MG 24 hr tablet Take 75 mg by mouth Every Morning.     • cetirizine (zyrTEC) 10 MG tablet Take 10 mg by mouth Daily.     • Cholecalciferol (Vitamin D High Potency) 25 MCG (1000 UT) capsule Take 1,000 Units by mouth Daily.     • escitalopram (LEXAPRO) 20 MG tablet TAKE ONE TABLET BY MOUTH DAILY 30 tablet 0   • fluticasone (FLONASE) 50 MCG/ACT nasal spray 2 sprays into the nostril(s) as directed by provider Daily.     • hydrOXYzine (ATARAX) 25 MG tablet TAKE ONE TABLET BY MOUTH ONCE NIGHTLY AS NEEDED FOR  ANXIETY (Patient taking differently: Take 25 mg by mouth At Night As Needed for Anxiety (SLEEP).) 30 tablet 3   • ipratropium-albuterol (DUO-NEB) 0.5-2.5 mg/3 ml nebulizer Take 3 mL by nebulization Every 4 (Four) Hours As Needed for Wheezing or Shortness of Air. 360 mL 2   • LORazepam (ATIVAN) 1 MG tablet Take 1 tablet by mouth 2 (Two) Times a Day As Needed for Anxiety. 40 tablet 0   • losartan (COZAAR) 50 MG tablet Take 50 mg by mouth Daily.     • metoprolol tartrate (LOPRESSOR) 50 MG tablet Take 1 tablet by mouth 2 (Two) Times a Day. 180 tablet 1   • multivitamin with minerals tablet tablet Take 1 tablet by mouth Daily.     • pravastatin (PRAVACHOL) 20 MG tablet TAKE ONE TABLET BY MOUTH DAILY (Patient taking differently: Take 20 mg by mouth Every Night.) 30 tablet 3   • promethazine (PHENERGAN) 25 MG tablet      • [DISCONTINUED] Fluticasone Furoate-Vilanterol (Breo Ellipta) 200-25 MCG/INH inhaler Inhale 1 puff Daily. 1 each 0     No current facility-administered medications on file prior to visit.       Allergies   Allergen Reactions   • Bupropion Palpitations     POSSIBLE   • Adhesive Tape Rash     SWELLS. Localized if left on for extended periods of time         Referring Provider:    No referring provider defined for this encounter.    Oncologist:  No primary care provider on file.      Seen and approved by:  Yary Anderson MD  09/06/2022

## 2022-09-07 ENCOUNTER — RADIATION ONCOLOGY WEEKLY ASSESSMENT (OUTPATIENT)
Dept: RADIATION ONCOLOGY | Facility: HOSPITAL | Age: 47
End: 2022-09-07

## 2022-09-07 ENCOUNTER — TREATMENT (OUTPATIENT)
Dept: RADIATION ONCOLOGY | Facility: HOSPITAL | Age: 47
End: 2022-09-07

## 2022-09-07 DIAGNOSIS — D05.11 DUCTAL CARCINOMA IN SITU (DCIS) OF RIGHT BREAST: Primary | ICD-10-CM

## 2022-09-07 LAB
RAD ONC ARIA COURSE ID: NORMAL
RAD ONC ARIA COURSE INTENT: NORMAL
RAD ONC ARIA COURSE LAST TREATMENT DATE: NORMAL
RAD ONC ARIA COURSE START DATE: NORMAL
RAD ONC ARIA COURSE TREATMENT ELAPSED DAYS: 1
RAD ONC ARIA FIRST TREATMENT DATE: NORMAL
RAD ONC ARIA PLAN FRACTIONS TREATED TO DATE: 2
RAD ONC ARIA PLAN ID: NORMAL
RAD ONC ARIA PLAN PRESCRIBED DOSE PER FRACTION: 2.66 GY
RAD ONC ARIA PLAN PRIMARY REFERENCE POINT: NORMAL
RAD ONC ARIA PLAN TOTAL FRACTIONS PRESCRIBED: 16
RAD ONC ARIA PLAN TOTAL PRESCRIBED DOSE: 4256 CGY
RAD ONC ARIA REFERENCE POINT DOSAGE GIVEN TO DATE: 5.07 GY
RAD ONC ARIA REFERENCE POINT DOSAGE GIVEN TO DATE: 5.15 GY
RAD ONC ARIA REFERENCE POINT DOSAGE GIVEN TO DATE: 5.32 GY
RAD ONC ARIA REFERENCE POINT ID: NORMAL
RAD ONC ARIA REFERENCE POINT SESSION DOSAGE GIVEN: 2.53 GY
RAD ONC ARIA REFERENCE POINT SESSION DOSAGE GIVEN: 2.58 GY
RAD ONC ARIA REFERENCE POINT SESSION DOSAGE GIVEN: 2.66 GY

## 2022-09-07 PROCEDURE — 77387 GUIDANCE FOR RADJ TX DLVR: CPT | Performed by: RADIOLOGY

## 2022-09-07 PROCEDURE — 77412 RADIATION TX DELIVERY LVL 3: CPT | Performed by: RADIOLOGY

## 2022-09-07 NOTE — PROGRESS NOTES
Physician Weekly Management Note    Diagnosis:     Diagnosis Plan   1. Ductal carcinoma in situ (DCIS) of right breast         RT Details:  fx 2/16 right breast 532cGy/4256cGy no boost     Notes on Treatment course, Films, Medical progress:  kps 90% doing well, no erythema no qeustions, cont on     Weekly Management:  Medication reviewed?   Yes  New medications given?   No  Problemlist reviewed?   Yes  Problem added?   No  Issues raised requiring referral to support services - task assigned to:  na    Technical aspects reviewed:  Weekly OBI approved?   Yes  Weekly port films approved?   Yes  Change requests noted on port film?   No  Patient setup and plan reviewed?   Yes    Chart Reviewed:  Continue current treatment plan?   Yes  Treatment plan change requested?   No  CBC reviewed?   No  Concurrent Chemo?   No    Objective     Toxicities:   none     Review of Systems   Constitutional: Negative.    Skin: Negative.           There were no vitals filed for this visit.    No flowsheet data found.    Physical Exam  Constitutional:       Appearance: Normal appearance.   Chest:          Comments: No erythema  Neurological:      Mental Status: She is alert.             Problem Summary List    Diagnosis:     Diagnosis Plan   1. Ductal carcinoma in situ (DCIS) of right breast       Pathology:   breast    Past Medical History:   Diagnosis Date   • Abnormal ECG    • Acid reflux     ON OCC   • Anesthesia complication     BP DROPPED DUE TO EPIDURAL FROM C-SECTIONS   • Anxiety    • Asthma 2017    Occasional reactive asthma/bronchitis after respiratory illness   • At risk for sleep apnea     STOP BANG 5   • Breast cancer (HCC) 05/13/2022    Right breast ductal carcinoma in situ, ER/CO positive, Ki-67 5%   • Depression 2004    Postpartum with 3 births   • Deviated septum    • Fatigue    • Fissure, anal    • History of UTI    • Hyperlipidemia    • Hypertension 2017    noted at last few urgent care visits   • Insomnia    • Migraine     • Obesity    • Pain in elbow joint     bilateral   • Rectal bleeding     RELATED TO RECTAL FISSUE   • Seasonal allergic rhinitis          Past Surgical History:   Procedure Laterality Date   • BREAST BIOPSY Right 2022    Tomosynthesis guided Mammotome vacuum assisted right breast 4 o'clock position-Dr. Desmond Zamarripa, [facility]   • BREAST LUMPECTOMY Right 2022    Procedure: RIGHT BREAST WIRE LOCALIZED LUMPECTOMY FOR DCIS;  Surgeon: Daisy Romero MD;  Location: [facility] OR;  Service: General;  Laterality: Right;   • BREAST SURGERY Bilateral 2022    Procedure: RIGHT BREAST ONCOPLASTIC CLOSURE LEFT BREST REDUCTION FOR SYMMETRY;  Surgeon: Rebecca Bhakta MD;  Location: [facility] OR;  Service: Plastics;  Laterality: Bilateral;   •  SECTION N/A     x 3   • D & C HYSTEROSCOPY ENDOMETRIAL ABLATION N/A 2018    Procedure: DILATATION AND CURETTAGE HYSTEROSCOPY NOVASURE ENDOMETRIAL ABLATION;  Surgeon: Puneet Carreno MD;  Location: [facility] OR [facility];  Service: Obstetrics/Gynecology   • MOHS SURGERY Left     Left Elbow & Left Scalp   • SKIN LESION EXCISION Right     Right Breast, Benign   • TUBAL ABDOMINAL LIGATION Bilateral 2013    after last    • WISDOM TOOTH EXTRACTION Bilateral          Current Outpatient Medications on File Prior to Visit   Medication Sig Dispense Refill   • ASPIRIN 81 PO Take 81 mg by mouth Daily.     • buPROPion XL (WELLBUTRIN XL) 150 MG 24 hr tablet Take 75 mg by mouth Every Morning.     • cetirizine (zyrTEC) 10 MG tablet Take 10 mg by mouth Daily.     • Cholecalciferol (Vitamin D High Potency) 25 MCG (1000 UT) capsule Take 1,000 Units by mouth Daily.     • escitalopram (LEXAPRO) 20 MG tablet TAKE ONE TABLET BY MOUTH DAILY 30 tablet 0   • fluticasone (FLONASE) 50 MCG/ACT nasal spray 2 sprays into the nostril(s) as directed by provider Daily.     • hydrOXYzine (ATARAX) 25 MG tablet TAKE ONE TABLET BY MOUTH ONCE NIGHTLY AS NEEDED FOR ANXIETY (Patient taking  differently: Take 25 mg by mouth At Night As Needed for Anxiety (SLEEP).) 30 tablet 3   • ipratropium-albuterol (DUO-NEB) 0.5-2.5 mg/3 ml nebulizer Take 3 mL by nebulization Every 4 (Four) Hours As Needed for Wheezing or Shortness of Air. 360 mL 2   • LORazepam (ATIVAN) 1 MG tablet Take 1 tablet by mouth 2 (Two) Times a Day As Needed for Anxiety. 40 tablet 0   • losartan (COZAAR) 50 MG tablet Take 50 mg by mouth Daily.     • metoprolol tartrate (LOPRESSOR) 50 MG tablet Take 1 tablet by mouth 2 (Two) Times a Day. 180 tablet 1   • multivitamin with minerals tablet tablet Take 1 tablet by mouth Daily.     • pravastatin (PRAVACHOL) 20 MG tablet TAKE ONE TABLET BY MOUTH DAILY (Patient taking differently: Take 20 mg by mouth Every Night.) 30 tablet 3   • promethazine (PHENERGAN) 25 MG tablet      • [DISCONTINUED] Fluticasone Furoate-Vilanterol (Breo Ellipta) 200-25 MCG/INH inhaler Inhale 1 puff Daily. 1 each 0     No current facility-administered medications on file prior to visit.       Allergies   Allergen Reactions   • Bupropion Palpitations     POSSIBLE   • Adhesive Tape Rash     SWELLS. Localized if left on for extended periods of time         Referring Provider:    No referring provider defined for this encounter.    Oncologist:  No primary care provider on file.      Seen and approved by:  Yary Anderson MD  09/07/2022  Subjective     No ref. provider found    Cancer Staging

## 2022-09-08 ENCOUNTER — TELEPHONE (OUTPATIENT)
Dept: CARDIOLOGY | Facility: CLINIC | Age: 47
End: 2022-09-08

## 2022-09-08 ENCOUNTER — TREATMENT (OUTPATIENT)
Dept: RADIATION ONCOLOGY | Facility: HOSPITAL | Age: 47
End: 2022-09-08

## 2022-09-08 ENCOUNTER — OFFICE VISIT (OUTPATIENT)
Dept: CARDIOLOGY | Facility: CLINIC | Age: 47
End: 2022-09-08

## 2022-09-08 VITALS
DIASTOLIC BLOOD PRESSURE: 80 MMHG | WEIGHT: 293 LBS | RESPIRATION RATE: 18 BRPM | HEIGHT: 67 IN | OXYGEN SATURATION: 99 % | HEART RATE: 82 BPM | SYSTOLIC BLOOD PRESSURE: 124 MMHG | BODY MASS INDEX: 45.99 KG/M2

## 2022-09-08 DIAGNOSIS — I10 ESSENTIAL HYPERTENSION: ICD-10-CM

## 2022-09-08 DIAGNOSIS — D05.11 DUCTAL CARCINOMA IN SITU (DCIS) OF RIGHT BREAST: ICD-10-CM

## 2022-09-08 DIAGNOSIS — I49.3 PVC (PREMATURE VENTRICULAR CONTRACTION): Primary | Chronic | ICD-10-CM

## 2022-09-08 LAB
RAD ONC ARIA COURSE ID: NORMAL
RAD ONC ARIA COURSE INTENT: NORMAL
RAD ONC ARIA COURSE LAST TREATMENT DATE: NORMAL
RAD ONC ARIA COURSE START DATE: NORMAL
RAD ONC ARIA COURSE TREATMENT ELAPSED DAYS: 2
RAD ONC ARIA FIRST TREATMENT DATE: NORMAL
RAD ONC ARIA PLAN FRACTIONS TREATED TO DATE: 3
RAD ONC ARIA PLAN ID: NORMAL
RAD ONC ARIA PLAN PRESCRIBED DOSE PER FRACTION: 2.66 GY
RAD ONC ARIA PLAN PRIMARY REFERENCE POINT: NORMAL
RAD ONC ARIA PLAN TOTAL FRACTIONS PRESCRIBED: 16
RAD ONC ARIA PLAN TOTAL PRESCRIBED DOSE: 4256 CGY
RAD ONC ARIA REFERENCE POINT DOSAGE GIVEN TO DATE: 7.6 GY
RAD ONC ARIA REFERENCE POINT DOSAGE GIVEN TO DATE: 7.73 GY
RAD ONC ARIA REFERENCE POINT DOSAGE GIVEN TO DATE: 7.98 GY
RAD ONC ARIA REFERENCE POINT ID: NORMAL
RAD ONC ARIA REFERENCE POINT SESSION DOSAGE GIVEN: 2.53 GY
RAD ONC ARIA REFERENCE POINT SESSION DOSAGE GIVEN: 2.58 GY
RAD ONC ARIA REFERENCE POINT SESSION DOSAGE GIVEN: 2.66 GY

## 2022-09-08 PROCEDURE — 77412 RADIATION TX DELIVERY LVL 3: CPT | Performed by: RADIOLOGY

## 2022-09-08 PROCEDURE — 99204 OFFICE O/P NEW MOD 45 MIN: CPT | Performed by: INTERNAL MEDICINE

## 2022-09-08 PROCEDURE — 77336 RADIATION PHYSICS CONSULT: CPT | Performed by: RADIOLOGY

## 2022-09-08 PROCEDURE — 77387 GUIDANCE FOR RADJ TX DLVR: CPT | Performed by: RADIOLOGY

## 2022-09-08 NOTE — PROGRESS NOTES
Subjective:     Encounter Date:09/08/22      Patient ID: Radha Torre is a 47 y.o. female.    Chief Complaint:  History of Present Illness    Dear Sofia,    I had the pleasure of seeing this patient in the office today for initial evaluation and consultation.  I appreciate that you sent her in to see us.  They come in today to be seen for PVCs.    Patient has a known history of PVCs.  She reports that she is previously been following with Dr. Foreman.  Years ago she saw Dr. Nicholas.  He has been known to have PVCs for very long time.  She states that more recently she saw Dr. Foreman had an evaluation including a 30-day monitor.  She was having fairly frequent PVCs that were symptomatic.  She is not sure exactly what the monitor other testing showed patient was placed on metoprolol and takes metoprolol tartrate 50 mg twice daily.  She says she really does not have much symptomatic palpitations now.    Main issue this summer has been right-sided breast cancer.  She has undergone a lumpectomy which was performed July 20, 2022.  She had grade 2 ductal carcinoma in situ ER/ND positive.  All margins were negative.  She is currently undergoing radiation therapy to the right breast.  She has not received chemotherapy.    No known history of coronary disease, congestive heart failure, rheumatic fever, rheumatic heart disease, or congenital heart disease.    She denies any cardiac complaints other than occasional palpitations.  She denies any chest pain, pressure, tightness, squeezing, or heartburn.  She has not experienced any feeling of tachycardia or heart racing and no presyncope or syncope.  There have not been any problems with dizziness or lightheadedness.  There have not been any orthopnea or PND, and no problems with lower extremity edema.  She denies any shortness of breath at rest or with activity and has not had any wheezing.  She has not had any problems with unexplained nausea or vomiting. She has  "continued to perform daily activities of living without any specific problem or change in the level of activity.  She has not been recently hospitalized for any reason.    The following portions of the patient's history were reviewed and updated as appropriate: allergies, current medications, past family history, past medical history, past social history, past surgical history and problem list.    Procedures       Objective:     Vitals:    09/08/22 0911   BP: 124/80   BP Location: Left arm   Patient Position: Sitting   Cuff Size: Large Adult   Pulse: 82   Resp: 18   SpO2: 99%   Weight: 134 kg (295 lb)   Height: 170.2 cm (67\")     Body mass index is 46.2 kg/m².      Vitals reviewed.   Constitutional:       General: Not in acute distress.     Appearance: Well-developed. Not diaphoretic.   Eyes:      General:         Right eye: No discharge.         Left eye: No discharge.      Conjunctiva/sclera: Conjunctivae normal.      Pupils: Pupils are equal, round, and reactive to light.   HENT:      Head: Normocephalic and atraumatic.      Nose: Nose normal.   Neck:      Thyroid: No thyromegaly.      Trachea: No tracheal deviation.      Lymphadenopathy: No cervical adenopathy.   Pulmonary:      Effort: Pulmonary effort is normal. No respiratory distress.      Breath sounds: Normal breath sounds. No stridor.   Chest:      Chest wall: Not tender to palpatation.   Cardiovascular:      Normal rate. Regular rhythm.      Murmurs: There is no murmur.      . No S3 gallop. No click. No rub.   Pulses:     Intact distal pulses.   Edema:     Peripheral edema absent.   Abdominal:      General: Bowel sounds are normal. There is no distension.      Palpations: Abdomen is soft. There is no abdominal mass.      Tenderness: There is no abdominal tenderness. There is no guarding or rebound.   Musculoskeletal: Normal range of motion.         General: No tenderness or deformity.      Cervical back: Normal range of motion and neck supple. Skin:     " General: Skin is warm and dry.      Findings: No erythema or rash.   Neurological:      Mental Status: Alert and oriented to person, place, and time.      Deep Tendon Reflexes: Reflexes are normal and symmetric.   Psychiatric:         Thought Content: Thought content normal.         Data and records reviewed:     Lab Results   Component Value Date    GLUCOSE 120 (H) 08/04/2022    BUN 9 08/04/2022    CREATININE 0.69 08/04/2022    EGFRIFNONA 100 01/10/2022    EGFRIFAFRI 115 01/10/2022    BCR 13.0 08/04/2022    K 4.9 08/04/2022    CO2 27.5 08/04/2022    CALCIUM 9.1 08/04/2022    PROTENTOTREF 7.7 01/10/2022    ALBUMIN 4.30 08/04/2022    LABIL2 1.5 01/10/2022    AST 19 08/04/2022    ALT 21 08/04/2022     No results found for: CHOL  Lab Results   Component Value Date    TRIG 412 (H) 01/10/2022    TRIG 169 (H) 08/01/2019    TRIG 132 05/11/2018     Lab Results   Component Value Date    HDL 45 01/10/2022    HDL 45 08/01/2019    HDL 52 05/11/2018     Lab Results   Component Value Date     (H) 01/10/2022     (H) 08/01/2019     (H) 05/11/2018     Lab Results   Component Value Date    VLDL 75 (H) 01/10/2022    VLDL 33.8 08/01/2019    VLDL 26.4 05/11/2018     Lab Results   Component Value Date    LDLHDL 3.0 01/10/2022     CBC    CBC 6/2/22 7/18/22 8/4/22   WBC 7.99 9.45 12.13 (A)   RBC 4.79 4.68 4.32   Hemoglobin 13.6 13.8 12.5   Hematocrit 42.5 40.9 39.6   MCV 88.7 87.4 91.7   MCH 28.4 29.5 28.9   MCHC 32.0 33.7 31.6   RDW 12.8 12.6 13.1   Platelets 344 331 338   (A) Abnormal value            XR Chest 1 View    Result Date: 7/10/2022  No acute cardiopulmonary findings. Signer Name: SANDRA Bell MD  Signed: 7/10/2022 6:03 PM  Workstation Name: [id number]  [facility]    Mammo Breast Placement Device Initial Without Biopsy    Result Date: 7/20/2022  Technically successful mammographic guided bracketed right breast needle localization.  This report was finalized on 7/20/2022 11:04  AM by Dr. Desmond Zamarripa M.D.      XR Breast Specimen    Result Date: 7/20/2022  Technically successful mammographic guided bracketed right breast needle localization.  This report was finalized on 7/20/2022 11:04 AM by Dr. Desmond Zamarripa M.D.              Assessment:          Diagnosis Plan   1. PVC (premature ventricular contraction)     2. Essential hypertension     3. Ductal carcinoma in situ (DCIS) of right breast            Plan:       1.  PVCs-on metoprolol, sounds like she is doing well.  She has been evaluated by Dr. Foreman.  We called his office to get her prior records and they refused to send anything until she physically went into their office and sign a release information form.  We will contact the office to let her know that I will review those records once they are available.  In the meantime we will continue current medical therapy.  2.  Hypertension, on losartan and metoprolol, blood pressure is good control today, we will follow  3.  Patient reports daily aspirin use, not sure why she is on that at this point, I will await the results from Dr. Foreman's office once they send her records.    Thank you very much for allowing us to participate in the care of this pleasant patient.  Please don't hesitate to call if I can be of assistance in any way.      Current Outpatient Medications:   •  cetirizine (zyrTEC) 10 MG tablet, Take 10 mg by mouth Daily. Rotates around with other allergy medicaitons, Disp: , Rfl:   •  Cholecalciferol (Vitamin D High Potency) 25 MCG (1000 UT) capsule, Take 1,000 Units by mouth Daily., Disp: , Rfl:   •  escitalopram (LEXAPRO) 20 MG tablet, TAKE ONE TABLET BY MOUTH DAILY, Disp: 30 tablet, Rfl: 0  •  hydrOXYzine (ATARAX) 25 MG tablet, TAKE ONE TABLET BY MOUTH ONCE NIGHTLY AS NEEDED FOR ANXIETY (Patient taking differently: Take 25 mg by mouth At Night As Needed for Anxiety (SLEEP).), Disp: 30 tablet, Rfl: 3  •  ipratropium-albuterol (DUO-NEB) 0.5-2.5 mg/3 ml nebulizer, Take  3 mL by nebulization Every 4 (Four) Hours As Needed for Wheezing or Shortness of Air. (Patient taking differently: Take 3 mL by nebulization Every 4 (Four) Hours As Needed for Wheezing or Shortness of Air. prn), Disp: 360 mL, Rfl: 2  •  losartan (COZAAR) 50 MG tablet, Take 50 mg by mouth Daily., Disp: , Rfl:   •  metoprolol tartrate (LOPRESSOR) 50 MG tablet, Take 1 tablet by mouth 2 (Two) Times a Day., Disp: 180 tablet, Rfl: 1  •  multivitamin with minerals tablet tablet, Take 1 tablet by mouth Daily., Disp: , Rfl:   •  pravastatin (PRAVACHOL) 20 MG tablet, TAKE ONE TABLET BY MOUTH DAILY (Patient taking differently: Take 20 mg by mouth Every Night.), Disp: 30 tablet, Rfl: 3  •  ASPIRIN 81 PO, Take 81 mg by mouth Daily., Disp: , Rfl:          Return in about 1 year (around 9/8/2023).

## 2022-09-08 NOTE — TELEPHONE ENCOUNTER
Patient seen today in office. Attempted to get her records from Dr Nye office ([facility]) however they will not release any records to our office unless patient goes to their office and signs a release form OR if she comes to our office signs a release form and we fax it to [phone].     I atttempted to call patient however it went to voicemail, advised patient to please call office. Just FYI in case she calls back

## 2022-09-09 ENCOUNTER — TREATMENT (OUTPATIENT)
Dept: RADIATION ONCOLOGY | Facility: HOSPITAL | Age: 47
End: 2022-09-09

## 2022-09-09 LAB
RAD ONC ARIA COURSE ID: NORMAL
RAD ONC ARIA COURSE INTENT: NORMAL
RAD ONC ARIA COURSE LAST TREATMENT DATE: NORMAL
RAD ONC ARIA COURSE START DATE: NORMAL
RAD ONC ARIA COURSE TREATMENT ELAPSED DAYS: 3
RAD ONC ARIA FIRST TREATMENT DATE: NORMAL
RAD ONC ARIA PLAN FRACTIONS TREATED TO DATE: 4
RAD ONC ARIA PLAN ID: NORMAL
RAD ONC ARIA PLAN PRESCRIBED DOSE PER FRACTION: 2.66 GY
RAD ONC ARIA PLAN PRIMARY REFERENCE POINT: NORMAL
RAD ONC ARIA PLAN TOTAL FRACTIONS PRESCRIBED: 16
RAD ONC ARIA PLAN TOTAL PRESCRIBED DOSE: 4256 CGY
RAD ONC ARIA REFERENCE POINT DOSAGE GIVEN TO DATE: 10.13 GY
RAD ONC ARIA REFERENCE POINT DOSAGE GIVEN TO DATE: 10.31 GY
RAD ONC ARIA REFERENCE POINT DOSAGE GIVEN TO DATE: 10.64 GY
RAD ONC ARIA REFERENCE POINT ID: NORMAL
RAD ONC ARIA REFERENCE POINT SESSION DOSAGE GIVEN: 2.53 GY
RAD ONC ARIA REFERENCE POINT SESSION DOSAGE GIVEN: 2.58 GY
RAD ONC ARIA REFERENCE POINT SESSION DOSAGE GIVEN: 2.66 GY

## 2022-09-09 PROCEDURE — 77412 RADIATION TX DELIVERY LVL 3: CPT | Performed by: RADIOLOGY

## 2022-09-09 PROCEDURE — 77387 GUIDANCE FOR RADJ TX DLVR: CPT | Performed by: RADIOLOGY

## 2022-09-11 DIAGNOSIS — D05.11 DUCTAL CARCINOMA IN SITU (DCIS) OF RIGHT BREAST: ICD-10-CM

## 2022-09-12 ENCOUNTER — TREATMENT (OUTPATIENT)
Dept: RADIATION ONCOLOGY | Facility: HOSPITAL | Age: 47
End: 2022-09-12

## 2022-09-12 LAB
RAD ONC ARIA COURSE ID: NORMAL
RAD ONC ARIA COURSE INTENT: NORMAL
RAD ONC ARIA COURSE LAST TREATMENT DATE: NORMAL
RAD ONC ARIA COURSE START DATE: NORMAL
RAD ONC ARIA COURSE TREATMENT ELAPSED DAYS: 6
RAD ONC ARIA FIRST TREATMENT DATE: NORMAL
RAD ONC ARIA PLAN FRACTIONS TREATED TO DATE: 5
RAD ONC ARIA PLAN ID: NORMAL
RAD ONC ARIA PLAN PRESCRIBED DOSE PER FRACTION: 2.66 GY
RAD ONC ARIA PLAN PRIMARY REFERENCE POINT: NORMAL
RAD ONC ARIA PLAN TOTAL FRACTIONS PRESCRIBED: 16
RAD ONC ARIA PLAN TOTAL PRESCRIBED DOSE: 4256 CGY
RAD ONC ARIA REFERENCE POINT DOSAGE GIVEN TO DATE: 12.66 GY
RAD ONC ARIA REFERENCE POINT DOSAGE GIVEN TO DATE: 12.88 GY
RAD ONC ARIA REFERENCE POINT DOSAGE GIVEN TO DATE: 13.3 GY
RAD ONC ARIA REFERENCE POINT ID: NORMAL
RAD ONC ARIA REFERENCE POINT SESSION DOSAGE GIVEN: 2.53 GY
RAD ONC ARIA REFERENCE POINT SESSION DOSAGE GIVEN: 2.58 GY
RAD ONC ARIA REFERENCE POINT SESSION DOSAGE GIVEN: 2.66 GY

## 2022-09-12 PROCEDURE — 77387 GUIDANCE FOR RADJ TX DLVR: CPT | Performed by: RADIOLOGY

## 2022-09-12 PROCEDURE — 77412 RADIATION TX DELIVERY LVL 3: CPT | Performed by: RADIOLOGY

## 2022-09-12 RX ORDER — LORAZEPAM 1 MG/1
TABLET ORAL
Qty: 40 TABLET | Refills: 0 | Status: SHIPPED | OUTPATIENT
Start: 2022-09-12 | End: 2022-10-12

## 2022-09-13 ENCOUNTER — TREATMENT (OUTPATIENT)
Dept: RADIATION ONCOLOGY | Facility: HOSPITAL | Age: 47
End: 2022-09-13

## 2022-09-13 LAB
RAD ONC ARIA COURSE ID: NORMAL
RAD ONC ARIA COURSE INTENT: NORMAL
RAD ONC ARIA COURSE LAST TREATMENT DATE: NORMAL
RAD ONC ARIA COURSE START DATE: NORMAL
RAD ONC ARIA COURSE TREATMENT ELAPSED DAYS: 7
RAD ONC ARIA FIRST TREATMENT DATE: NORMAL
RAD ONC ARIA PLAN FRACTIONS TREATED TO DATE: 6
RAD ONC ARIA PLAN ID: NORMAL
RAD ONC ARIA PLAN PRESCRIBED DOSE PER FRACTION: 2.66 GY
RAD ONC ARIA PLAN PRIMARY REFERENCE POINT: NORMAL
RAD ONC ARIA PLAN TOTAL FRACTIONS PRESCRIBED: 16
RAD ONC ARIA PLAN TOTAL PRESCRIBED DOSE: 4256 CGY
RAD ONC ARIA REFERENCE POINT DOSAGE GIVEN TO DATE: 15.2 GY
RAD ONC ARIA REFERENCE POINT DOSAGE GIVEN TO DATE: 15.46 GY
RAD ONC ARIA REFERENCE POINT DOSAGE GIVEN TO DATE: 15.96 GY
RAD ONC ARIA REFERENCE POINT ID: NORMAL
RAD ONC ARIA REFERENCE POINT SESSION DOSAGE GIVEN: 2.53 GY
RAD ONC ARIA REFERENCE POINT SESSION DOSAGE GIVEN: 2.58 GY
RAD ONC ARIA REFERENCE POINT SESSION DOSAGE GIVEN: 2.66 GY

## 2022-09-13 PROCEDURE — 77427 RADIATION TX MANAGEMENT X5: CPT | Performed by: RADIOLOGY

## 2022-09-13 PROCEDURE — 77412 RADIATION TX DELIVERY LVL 3: CPT | Performed by: RADIOLOGY

## 2022-09-13 PROCEDURE — 77387 GUIDANCE FOR RADJ TX DLVR: CPT | Performed by: RADIOLOGY

## 2022-09-14 ENCOUNTER — TREATMENT (OUTPATIENT)
Dept: RADIATION ONCOLOGY | Facility: HOSPITAL | Age: 47
End: 2022-09-14

## 2022-09-14 ENCOUNTER — RADIATION ONCOLOGY WEEKLY ASSESSMENT (OUTPATIENT)
Dept: RADIATION ONCOLOGY | Facility: HOSPITAL | Age: 47
End: 2022-09-14

## 2022-09-14 VITALS
OXYGEN SATURATION: 98 % | WEIGHT: 293 LBS | DIASTOLIC BLOOD PRESSURE: 86 MMHG | BODY MASS INDEX: 46.92 KG/M2 | SYSTOLIC BLOOD PRESSURE: 129 MMHG | HEART RATE: 70 BPM

## 2022-09-14 DIAGNOSIS — D05.11 DUCTAL CARCINOMA IN SITU (DCIS) OF RIGHT BREAST: Primary | ICD-10-CM

## 2022-09-14 LAB
RAD ONC ARIA COURSE ID: NORMAL
RAD ONC ARIA COURSE INTENT: NORMAL
RAD ONC ARIA COURSE LAST TREATMENT DATE: NORMAL
RAD ONC ARIA COURSE START DATE: NORMAL
RAD ONC ARIA COURSE TREATMENT ELAPSED DAYS: 8
RAD ONC ARIA FIRST TREATMENT DATE: NORMAL
RAD ONC ARIA PLAN FRACTIONS TREATED TO DATE: 7
RAD ONC ARIA PLAN ID: NORMAL
RAD ONC ARIA PLAN PRESCRIBED DOSE PER FRACTION: 2.66 GY
RAD ONC ARIA PLAN PRIMARY REFERENCE POINT: NORMAL
RAD ONC ARIA PLAN TOTAL FRACTIONS PRESCRIBED: 16
RAD ONC ARIA PLAN TOTAL PRESCRIBED DOSE: 4256 CGY
RAD ONC ARIA REFERENCE POINT DOSAGE GIVEN TO DATE: 17.73 GY
RAD ONC ARIA REFERENCE POINT DOSAGE GIVEN TO DATE: 18.04 GY
RAD ONC ARIA REFERENCE POINT DOSAGE GIVEN TO DATE: 18.62 GY
RAD ONC ARIA REFERENCE POINT ID: NORMAL
RAD ONC ARIA REFERENCE POINT SESSION DOSAGE GIVEN: 2.53 GY
RAD ONC ARIA REFERENCE POINT SESSION DOSAGE GIVEN: 2.58 GY
RAD ONC ARIA REFERENCE POINT SESSION DOSAGE GIVEN: 2.66 GY

## 2022-09-14 PROCEDURE — 77387 GUIDANCE FOR RADJ TX DLVR: CPT | Performed by: RADIOLOGY

## 2022-09-14 PROCEDURE — 77412 RADIATION TX DELIVERY LVL 3: CPT | Performed by: RADIOLOGY

## 2022-09-14 NOTE — PROGRESS NOTES
Subjective     No ref. provider found    Cancer Staging  Physician Weekly Management Note    Diagnosis:     Diagnosis Plan   1. Ductal carcinoma in situ (DCIS) of right breast         RT Details: fx 7/16 right breast 1862/4256cGy    Notes on Treatment course, Films, Medical progress:  Doing well, no erythema, cont on kps 90%    Weekly Management:  Medication reviewed?   Yes  New medications given?   No  Problemlist reviewed?   Yes  Problem added?   No  Issues raised requiring referral to support services - task assigned to:  na    Technical aspects reviewed:  Weekly OBI approved?   Yes  Weekly port films approved?   Yes  Change requests noted on port film?   No  Patient setup and plan reviewed?   Yes    Chart Reviewed:  Continue current treatment plan?   Yes  Treatment plan change requested?   No  CBC reviewed?   Yes  Concurrent Chemo?   No    Objective     Toxicities:   none     Review of Systems   Constitutional: Negative.    Skin: Negative.           There were no vitals filed for this visit.    No flowsheet data found.    Physical Exam  Constitutional:       Appearance: Normal appearance.   Chest:          Comments: Minimal erythema  Neurological:      Mental Status: She is alert.             Problem Summary List    Diagnosis:     Diagnosis Plan   1. Ductal carcinoma in situ (DCIS) of right breast       Pathology:   Breast dcis    Past Medical History:   Diagnosis Date   • Abnormal ECG    • Acid reflux     ON OCC   • Anesthesia complication     BP DROPPED DUE TO EPIDURAL FROM C-SECTIONS   • Anxiety    • Asthma 2017    Occasional reactive asthma/bronchitis after respiratory illness   • At risk for sleep apnea     STOP BANG 5   • Breast cancer (HCC) 05/13/2022    Right breast ductal carcinoma in situ, ER/NC positive, Ki-67 5%   • Depression 2004    Postpartum with 3 births   • Deviated septum    • Fatigue    • Fissure, anal    • History of UTI    • Hyperlipidemia    • Hypertension 2017    noted at last few  urgent care visits   • Insomnia    • Migraine    • Obesity    • Pain in elbow joint     bilateral   • PVC (premature ventricular contraction) 2022   • Rectal bleeding     RELATED TO RECTAL FISSUE   • Seasonal allergic rhinitis          Past Surgical History:   Procedure Laterality Date   • BREAST BIOPSY Right 2022    Tomosynthesis guided Mammotome vacuum assisted right breast 4 o'clock position-Dr. Desmond Zamarripa, [facility]   • BREAST LUMPECTOMY Right 2022    Procedure: RIGHT BREAST WIRE LOCALIZED LUMPECTOMY FOR DCIS;  Surgeon: Daisy Romero MD;  Location: [facility];  Service: General;  Laterality: Right;   • BREAST SURGERY Bilateral 2022    Procedure: RIGHT BREAST ONCOPLASTIC CLOSURE LEFT BREST REDUCTION FOR SYMMETRY;  Surgeon: Rebecca Bhakta MD;  Location: [facility];  Service: Plastics;  Laterality: Bilateral;   •  SECTION N/A     x 3   • D & C HYSTEROSCOPY ENDOMETRIAL ABLATION N/A 2018    Procedure: DILATATION AND CURETTAGE HYSTEROSCOPY NOVASURE ENDOMETRIAL ABLATION;  Surgeon: Puneet Carreno MD;  Location: [facility];  Service: Obstetrics/Gynecology   • MOHS SURGERY Left     Left Elbow & Left Scalp   • SKIN LESION EXCISION Right     Right Breast, Benign   • TUBAL ABDOMINAL LIGATION Bilateral 2013    after last    • WISDOM TOOTH EXTRACTION Bilateral          Current Outpatient Medications on File Prior to Visit   Medication Sig Dispense Refill   • ASPIRIN 81 PO Take 81 mg by mouth Daily.     • cetirizine (zyrTEC) 10 MG tablet Take 10 mg by mouth Daily. Rotates around with other allergy medicaitons     • Cholecalciferol (Vitamin D High Potency) 25 MCG (1000 UT) capsule Take 1,000 Units by mouth Daily.     • escitalopram (LEXAPRO) 20 MG tablet TAKE ONE TABLET BY MOUTH DAILY 30 tablet 0   • hydrOXYzine (ATARAX) 25 MG tablet TAKE ONE TABLET BY MOUTH ONCE NIGHTLY AS NEEDED FOR ANXIETY (Patient taking differently: Take 25 mg by mouth At Night As Needed for  Anxiety (SLEEP).) 30 tablet 3   • ipratropium-albuterol (DUO-NEB) 0.5-2.5 mg/3 ml nebulizer Take 3 mL by nebulization Every 4 (Four) Hours As Needed for Wheezing or Shortness of Air. (Patient taking differently: Take 3 mL by nebulization Every 4 (Four) Hours As Needed for Wheezing or Shortness of Air. prn) 360 mL 2   • LORazepam (ATIVAN) 1 MG tablet TAKE ONE TABLET BY MOUTH TWICE A DAY AS NEEDED FOR ANXIETY 40 tablet 0   • losartan (COZAAR) 50 MG tablet Take 50 mg by mouth Daily.     • metoprolol tartrate (LOPRESSOR) 50 MG tablet Take 1 tablet by mouth 2 (Two) Times a Day. 180 tablet 1   • multivitamin with minerals tablet tablet Take 1 tablet by mouth Daily.     • pravastatin (PRAVACHOL) 20 MG tablet TAKE ONE TABLET BY MOUTH DAILY (Patient taking differently: Take 20 mg by mouth Every Night.) 30 tablet 3   • [DISCONTINUED] Fluticasone Furoate-Vilanterol (Breo Ellipta) 200-25 MCG/INH inhaler Inhale 1 puff Daily. 1 each 0     No current facility-administered medications on file prior to visit.       Allergies   Allergen Reactions   • Bupropion Palpitations     POSSIBLE   • Adhesive Tape Rash     SWELLS. Localized if left on for extended periods of time         Referring Provider:    No referring provider defined for this encounter.    Oncologist:  No primary care provider on file.      Seen and approved by:  Yary Anderson MD  09/14/2022

## 2022-09-15 ENCOUNTER — TREATMENT (OUTPATIENT)
Dept: RADIATION ONCOLOGY | Facility: HOSPITAL | Age: 47
End: 2022-09-15

## 2022-09-15 LAB
RAD ONC ARIA COURSE ID: NORMAL
RAD ONC ARIA COURSE INTENT: NORMAL
RAD ONC ARIA COURSE LAST TREATMENT DATE: NORMAL
RAD ONC ARIA COURSE START DATE: NORMAL
RAD ONC ARIA COURSE TREATMENT ELAPSED DAYS: 9
RAD ONC ARIA FIRST TREATMENT DATE: NORMAL
RAD ONC ARIA PLAN FRACTIONS TREATED TO DATE: 8
RAD ONC ARIA PLAN ID: NORMAL
RAD ONC ARIA PLAN PRESCRIBED DOSE PER FRACTION: 2.66 GY
RAD ONC ARIA PLAN PRIMARY REFERENCE POINT: NORMAL
RAD ONC ARIA PLAN TOTAL FRACTIONS PRESCRIBED: 16
RAD ONC ARIA PLAN TOTAL PRESCRIBED DOSE: 4256 CGY
RAD ONC ARIA REFERENCE POINT DOSAGE GIVEN TO DATE: 20.26 GY
RAD ONC ARIA REFERENCE POINT DOSAGE GIVEN TO DATE: 20.62 GY
RAD ONC ARIA REFERENCE POINT DOSAGE GIVEN TO DATE: 21.28 GY
RAD ONC ARIA REFERENCE POINT ID: NORMAL
RAD ONC ARIA REFERENCE POINT SESSION DOSAGE GIVEN: 2.53 GY
RAD ONC ARIA REFERENCE POINT SESSION DOSAGE GIVEN: 2.58 GY
RAD ONC ARIA REFERENCE POINT SESSION DOSAGE GIVEN: 2.66 GY

## 2022-09-15 PROCEDURE — 77387 GUIDANCE FOR RADJ TX DLVR: CPT | Performed by: RADIOLOGY

## 2022-09-15 PROCEDURE — 77412 RADIATION TX DELIVERY LVL 3: CPT | Performed by: RADIOLOGY

## 2022-09-15 PROCEDURE — 77336 RADIATION PHYSICS CONSULT: CPT | Performed by: RADIOLOGY

## 2022-09-16 ENCOUNTER — TREATMENT (OUTPATIENT)
Dept: RADIATION ONCOLOGY | Facility: HOSPITAL | Age: 47
End: 2022-09-16

## 2022-09-16 LAB
RAD ONC ARIA COURSE ID: NORMAL
RAD ONC ARIA COURSE INTENT: NORMAL
RAD ONC ARIA COURSE LAST TREATMENT DATE: NORMAL
RAD ONC ARIA COURSE START DATE: NORMAL
RAD ONC ARIA COURSE TREATMENT ELAPSED DAYS: 10
RAD ONC ARIA FIRST TREATMENT DATE: NORMAL
RAD ONC ARIA PLAN FRACTIONS TREATED TO DATE: 9
RAD ONC ARIA PLAN ID: NORMAL
RAD ONC ARIA PLAN PRESCRIBED DOSE PER FRACTION: 2.66 GY
RAD ONC ARIA PLAN PRIMARY REFERENCE POINT: NORMAL
RAD ONC ARIA PLAN TOTAL FRACTIONS PRESCRIBED: 16
RAD ONC ARIA PLAN TOTAL PRESCRIBED DOSE: 4256 CGY
RAD ONC ARIA REFERENCE POINT DOSAGE GIVEN TO DATE: 22.8 GY
RAD ONC ARIA REFERENCE POINT DOSAGE GIVEN TO DATE: 23.19 GY
RAD ONC ARIA REFERENCE POINT DOSAGE GIVEN TO DATE: 23.94 GY
RAD ONC ARIA REFERENCE POINT ID: NORMAL
RAD ONC ARIA REFERENCE POINT SESSION DOSAGE GIVEN: 2.53 GY
RAD ONC ARIA REFERENCE POINT SESSION DOSAGE GIVEN: 2.58 GY
RAD ONC ARIA REFERENCE POINT SESSION DOSAGE GIVEN: 2.66 GY

## 2022-09-16 PROCEDURE — 77412 RADIATION TX DELIVERY LVL 3: CPT | Performed by: RADIOLOGY

## 2022-09-16 PROCEDURE — 77387 GUIDANCE FOR RADJ TX DLVR: CPT | Performed by: RADIOLOGY

## 2022-09-19 ENCOUNTER — TREATMENT (OUTPATIENT)
Dept: RADIATION ONCOLOGY | Facility: HOSPITAL | Age: 47
End: 2022-09-19

## 2022-09-19 LAB
RAD ONC ARIA COURSE ID: NORMAL
RAD ONC ARIA COURSE INTENT: NORMAL
RAD ONC ARIA COURSE LAST TREATMENT DATE: NORMAL
RAD ONC ARIA COURSE START DATE: NORMAL
RAD ONC ARIA COURSE TREATMENT ELAPSED DAYS: 13
RAD ONC ARIA FIRST TREATMENT DATE: NORMAL
RAD ONC ARIA PLAN FRACTIONS TREATED TO DATE: 10
RAD ONC ARIA PLAN ID: NORMAL
RAD ONC ARIA PLAN PRESCRIBED DOSE PER FRACTION: 2.66 GY
RAD ONC ARIA PLAN PRIMARY REFERENCE POINT: NORMAL
RAD ONC ARIA PLAN TOTAL FRACTIONS PRESCRIBED: 16
RAD ONC ARIA PLAN TOTAL PRESCRIBED DOSE: 4256 CGY
RAD ONC ARIA REFERENCE POINT DOSAGE GIVEN TO DATE: 25.33 GY
RAD ONC ARIA REFERENCE POINT DOSAGE GIVEN TO DATE: 25.77 GY
RAD ONC ARIA REFERENCE POINT DOSAGE GIVEN TO DATE: 26.6 GY
RAD ONC ARIA REFERENCE POINT ID: NORMAL
RAD ONC ARIA REFERENCE POINT SESSION DOSAGE GIVEN: 2.53 GY
RAD ONC ARIA REFERENCE POINT SESSION DOSAGE GIVEN: 2.58 GY
RAD ONC ARIA REFERENCE POINT SESSION DOSAGE GIVEN: 2.66 GY

## 2022-09-19 PROCEDURE — 77412 RADIATION TX DELIVERY LVL 3: CPT | Performed by: RADIOLOGY

## 2022-09-19 PROCEDURE — 77387 GUIDANCE FOR RADJ TX DLVR: CPT | Performed by: RADIOLOGY

## 2022-09-20 ENCOUNTER — TREATMENT (OUTPATIENT)
Dept: RADIATION ONCOLOGY | Facility: HOSPITAL | Age: 47
End: 2022-09-20

## 2022-09-20 LAB
RAD ONC ARIA COURSE ID: NORMAL
RAD ONC ARIA COURSE INTENT: NORMAL
RAD ONC ARIA COURSE LAST TREATMENT DATE: NORMAL
RAD ONC ARIA COURSE START DATE: NORMAL
RAD ONC ARIA COURSE TREATMENT ELAPSED DAYS: 14
RAD ONC ARIA FIRST TREATMENT DATE: NORMAL
RAD ONC ARIA PLAN FRACTIONS TREATED TO DATE: 11
RAD ONC ARIA PLAN ID: NORMAL
RAD ONC ARIA PLAN PRESCRIBED DOSE PER FRACTION: 2.66 GY
RAD ONC ARIA PLAN PRIMARY REFERENCE POINT: NORMAL
RAD ONC ARIA PLAN TOTAL FRACTIONS PRESCRIBED: 16
RAD ONC ARIA PLAN TOTAL PRESCRIBED DOSE: 4256 CGY
RAD ONC ARIA REFERENCE POINT DOSAGE GIVEN TO DATE: 27.86 GY
RAD ONC ARIA REFERENCE POINT DOSAGE GIVEN TO DATE: 28.35 GY
RAD ONC ARIA REFERENCE POINT DOSAGE GIVEN TO DATE: 29.26 GY
RAD ONC ARIA REFERENCE POINT ID: NORMAL
RAD ONC ARIA REFERENCE POINT SESSION DOSAGE GIVEN: 2.53 GY
RAD ONC ARIA REFERENCE POINT SESSION DOSAGE GIVEN: 2.58 GY
RAD ONC ARIA REFERENCE POINT SESSION DOSAGE GIVEN: 2.66 GY

## 2022-09-20 PROCEDURE — 77427 RADIATION TX MANAGEMENT X5: CPT | Performed by: RADIOLOGY

## 2022-09-20 PROCEDURE — 77412 RADIATION TX DELIVERY LVL 3: CPT | Performed by: RADIOLOGY

## 2022-09-20 PROCEDURE — 77387 GUIDANCE FOR RADJ TX DLVR: CPT | Performed by: RADIOLOGY

## 2022-09-21 ENCOUNTER — TREATMENT (OUTPATIENT)
Dept: RADIATION ONCOLOGY | Facility: HOSPITAL | Age: 47
End: 2022-09-21

## 2022-09-21 ENCOUNTER — RADIATION ONCOLOGY WEEKLY ASSESSMENT (OUTPATIENT)
Dept: RADIATION ONCOLOGY | Facility: HOSPITAL | Age: 47
End: 2022-09-21

## 2022-09-21 VITALS
WEIGHT: 293 LBS | RESPIRATION RATE: 20 BRPM | SYSTOLIC BLOOD PRESSURE: 114 MMHG | BODY MASS INDEX: 46.42 KG/M2 | DIASTOLIC BLOOD PRESSURE: 78 MMHG | HEART RATE: 58 BPM | OXYGEN SATURATION: 100 %

## 2022-09-21 DIAGNOSIS — D05.11 DUCTAL CARCINOMA IN SITU (DCIS) OF RIGHT BREAST: Primary | ICD-10-CM

## 2022-09-21 LAB
RAD ONC ARIA COURSE ID: NORMAL
RAD ONC ARIA COURSE INTENT: NORMAL
RAD ONC ARIA COURSE LAST TREATMENT DATE: NORMAL
RAD ONC ARIA COURSE START DATE: NORMAL
RAD ONC ARIA COURSE TREATMENT ELAPSED DAYS: 15
RAD ONC ARIA FIRST TREATMENT DATE: NORMAL
RAD ONC ARIA PLAN FRACTIONS TREATED TO DATE: 12
RAD ONC ARIA PLAN ID: NORMAL
RAD ONC ARIA PLAN PRESCRIBED DOSE PER FRACTION: 2.66 GY
RAD ONC ARIA PLAN PRIMARY REFERENCE POINT: NORMAL
RAD ONC ARIA PLAN TOTAL FRACTIONS PRESCRIBED: 16
RAD ONC ARIA PLAN TOTAL PRESCRIBED DOSE: 4256 CGY
RAD ONC ARIA REFERENCE POINT DOSAGE GIVEN TO DATE: 30.4 GY
RAD ONC ARIA REFERENCE POINT DOSAGE GIVEN TO DATE: 30.92 GY
RAD ONC ARIA REFERENCE POINT DOSAGE GIVEN TO DATE: 31.92 GY
RAD ONC ARIA REFERENCE POINT ID: NORMAL
RAD ONC ARIA REFERENCE POINT SESSION DOSAGE GIVEN: 2.53 GY
RAD ONC ARIA REFERENCE POINT SESSION DOSAGE GIVEN: 2.58 GY
RAD ONC ARIA REFERENCE POINT SESSION DOSAGE GIVEN: 2.66 GY

## 2022-09-21 PROCEDURE — 77412 RADIATION TX DELIVERY LVL 3: CPT | Performed by: RADIOLOGY

## 2022-09-21 PROCEDURE — 77387 GUIDANCE FOR RADJ TX DLVR: CPT | Performed by: RADIOLOGY

## 2022-09-21 NOTE — PROGRESS NOTES
Physician Weekly Management Note    Diagnosis:     Diagnosis Plan   1. Ductal carcinoma in situ (DCIS) of right breast         RT Details:  fx 12/16 right breast 3192cGy of 4256Cgy     Notes on Treatment course, Films, Medical progress:  Doing well, kps 90%, mild erythema, mild fatigue, cont on     Weekly Management:  Medication reviewed?   Yes  New medications given?   No  Problemlist reviewed?   Yes  Problem added?   No  Issues raised requiring referral to support services - task assigned to:  na    Technical aspects reviewed:  Weekly OBI approved?   Yes  Weekly port films approved?   Yes  Change requests noted on port film?   No  Patient setup and plan reviewed?   Yes    Chart Reviewed:  Continue current treatment plan?   Yes  Treatment plan change requested?   No  CBC reviewed?   No  Concurrent Chemo?   No    Objective     Toxicities:   none     Review of Systems   Constitutional: Positive for fatigue.   Skin: Positive for color change.          There were no vitals filed for this visit.    No flowsheet data found.    Physical Exam  Chest:          Comments: Mild erythema  Neurological:      Mental Status: She is alert.             Problem Summary List    Diagnosis:     Diagnosis Plan   1. Ductal carcinoma in situ (DCIS) of right breast       Pathology:   breast    Past Medical History:   Diagnosis Date   • Abnormal ECG    • Acid reflux     ON OCC   • Anesthesia complication     BP DROPPED DUE TO EPIDURAL FROM C-SECTIONS   • Anxiety    • Asthma 2017    Occasional reactive asthma/bronchitis after respiratory illness   • At risk for sleep apnea     STOP BANG 5   • Breast cancer (HCC) 05/13/2022    Right breast ductal carcinoma in situ, ER/UT positive, Ki-67 5%   • Depression 2004    Postpartum with 3 births   • Deviated septum    • Fatigue    • Fissure, anal    • History of UTI    • Hyperlipidemia    • Hypertension 2017    noted at last few urgent care visits   • Insomnia    • Migraine    • Obesity    • Pain in  elbow joint     bilateral   • PVC (premature ventricular contraction) 2022   • Rectal bleeding     RELATED TO RECTAL FISSUE   • Seasonal allergic rhinitis          Past Surgical History:   Procedure Laterality Date   • BREAST BIOPSY Right 2022    Tomosynthesis guided Mammotome vacuum assisted right breast 4 o'clock position-Dr. Desmond Zamarripa, [facility]   • BREAST LUMPECTOMY Right 2022    Procedure: RIGHT BREAST WIRE LOCALIZED LUMPECTOMY FOR DCIS;  Surgeon: Daisy Romero MD;  Location: [facility];  Service: General;  Laterality: Right;   • BREAST SURGERY Bilateral 2022    Procedure: RIGHT BREAST ONCOPLASTIC CLOSURE LEFT BREST REDUCTION FOR SYMMETRY;  Surgeon: Rebecca Bhakta MD;  Location: [facility];  Service: Plastics;  Laterality: Bilateral;   •  SECTION N/A     x 3   • D & C HYSTEROSCOPY ENDOMETRIAL ABLATION N/A 2018    Procedure: DILATATION AND CURETTAGE HYSTEROSCOPY NOVASURE ENDOMETRIAL ABLATION;  Surgeon: Puneet Carreno MD;  Location: [facility];  Service: Obstetrics/Gynecology   • MOHS SURGERY Left     Left Elbow & Left Scalp   • SKIN LESION EXCISION Right     Right Breast, Benign   • TUBAL ABDOMINAL LIGATION Bilateral 2013    after last    • WISDOM TOOTH EXTRACTION Bilateral          Current Outpatient Medications on File Prior to Visit   Medication Sig Dispense Refill   • ASPIRIN 81 PO Take 81 mg by mouth Daily.     • cetirizine (zyrTEC) 10 MG tablet Take 10 mg by mouth Daily. Rotates around with other allergy medicaitons     • Cholecalciferol (Vitamin D High Potency) 25 MCG (1000 UT) capsule Take 1,000 Units by mouth Daily.     • escitalopram (LEXAPRO) 20 MG tablet TAKE ONE TABLET BY MOUTH DAILY 30 tablet 0   • hydrOXYzine (ATARAX) 25 MG tablet TAKE ONE TABLET BY MOUTH ONCE NIGHTLY AS NEEDED FOR ANXIETY (Patient taking differently: Take 25 mg by mouth At Night As Needed for Anxiety (SLEEP).) 30 tablet 3   • ipratropium-albuterol (DUO-NEB)  0.5-2.5 mg/3 ml nebulizer Take 3 mL by nebulization Every 4 (Four) Hours As Needed for Wheezing or Shortness of Air. (Patient taking differently: Take 3 mL by nebulization Every 4 (Four) Hours As Needed for Wheezing or Shortness of Air. prn) 360 mL 2   • LORazepam (ATIVAN) 1 MG tablet TAKE ONE TABLET BY MOUTH TWICE A DAY AS NEEDED FOR ANXIETY 40 tablet 0   • losartan (COZAAR) 50 MG tablet Take 50 mg by mouth Daily.     • metoprolol tartrate (LOPRESSOR) 50 MG tablet Take 1 tablet by mouth 2 (Two) Times a Day. 180 tablet 1   • multivitamin with minerals tablet tablet Take 1 tablet by mouth Daily.     • pravastatin (PRAVACHOL) 20 MG tablet TAKE ONE TABLET BY MOUTH DAILY (Patient taking differently: Take 20 mg by mouth Every Night.) 30 tablet 3   • [DISCONTINUED] Fluticasone Furoate-Vilanterol (Breo Ellipta) 200-25 MCG/INH inhaler Inhale 1 puff Daily. 1 each 0     No current facility-administered medications on file prior to visit.       Allergies   Allergen Reactions   • Bupropion Palpitations     POSSIBLE   • Adhesive Tape Rash     SWELLS. Localized if left on for extended periods of time         Referring Provider:    No referring provider defined for this encounter.    Oncologist:  No primary care provider on file.      Seen and approved by:  Yray Anderson MD  09/21/2022  Subjective     No ref. provider found    Cancer Staging

## 2022-09-22 ENCOUNTER — TREATMENT (OUTPATIENT)
Dept: RADIATION ONCOLOGY | Facility: HOSPITAL | Age: 47
End: 2022-09-22

## 2022-09-22 LAB
RAD ONC ARIA COURSE ID: NORMAL
RAD ONC ARIA COURSE INTENT: NORMAL
RAD ONC ARIA COURSE LAST TREATMENT DATE: NORMAL
RAD ONC ARIA COURSE START DATE: NORMAL
RAD ONC ARIA COURSE TREATMENT ELAPSED DAYS: 16
RAD ONC ARIA FIRST TREATMENT DATE: NORMAL
RAD ONC ARIA PLAN FRACTIONS TREATED TO DATE: 13
RAD ONC ARIA PLAN ID: NORMAL
RAD ONC ARIA PLAN PRESCRIBED DOSE PER FRACTION: 2.66 GY
RAD ONC ARIA PLAN PRIMARY REFERENCE POINT: NORMAL
RAD ONC ARIA PLAN TOTAL FRACTIONS PRESCRIBED: 16
RAD ONC ARIA PLAN TOTAL PRESCRIBED DOSE: 4256 CGY
RAD ONC ARIA REFERENCE POINT DOSAGE GIVEN TO DATE: 32.93 GY
RAD ONC ARIA REFERENCE POINT DOSAGE GIVEN TO DATE: 33.5 GY
RAD ONC ARIA REFERENCE POINT DOSAGE GIVEN TO DATE: 34.58 GY
RAD ONC ARIA REFERENCE POINT ID: NORMAL
RAD ONC ARIA REFERENCE POINT SESSION DOSAGE GIVEN: 2.53 GY
RAD ONC ARIA REFERENCE POINT SESSION DOSAGE GIVEN: 2.58 GY
RAD ONC ARIA REFERENCE POINT SESSION DOSAGE GIVEN: 2.66 GY

## 2022-09-22 PROCEDURE — 77412 RADIATION TX DELIVERY LVL 3: CPT | Performed by: RADIOLOGY

## 2022-09-22 PROCEDURE — 77387 GUIDANCE FOR RADJ TX DLVR: CPT | Performed by: RADIOLOGY

## 2022-09-23 ENCOUNTER — TREATMENT (OUTPATIENT)
Dept: RADIATION ONCOLOGY | Facility: HOSPITAL | Age: 47
End: 2022-09-23

## 2022-09-23 LAB
RAD ONC ARIA COURSE ID: NORMAL
RAD ONC ARIA COURSE INTENT: NORMAL
RAD ONC ARIA COURSE LAST TREATMENT DATE: NORMAL
RAD ONC ARIA COURSE START DATE: NORMAL
RAD ONC ARIA COURSE TREATMENT ELAPSED DAYS: 17
RAD ONC ARIA FIRST TREATMENT DATE: NORMAL
RAD ONC ARIA PLAN FRACTIONS TREATED TO DATE: 14
RAD ONC ARIA PLAN ID: NORMAL
RAD ONC ARIA PLAN PRESCRIBED DOSE PER FRACTION: 2.66 GY
RAD ONC ARIA PLAN PRIMARY REFERENCE POINT: NORMAL
RAD ONC ARIA PLAN TOTAL FRACTIONS PRESCRIBED: 16
RAD ONC ARIA PLAN TOTAL PRESCRIBED DOSE: 4256 CGY
RAD ONC ARIA REFERENCE POINT DOSAGE GIVEN TO DATE: 35.46 GY
RAD ONC ARIA REFERENCE POINT DOSAGE GIVEN TO DATE: 36.08 GY
RAD ONC ARIA REFERENCE POINT DOSAGE GIVEN TO DATE: 37.24 GY
RAD ONC ARIA REFERENCE POINT ID: NORMAL
RAD ONC ARIA REFERENCE POINT SESSION DOSAGE GIVEN: 2.53 GY
RAD ONC ARIA REFERENCE POINT SESSION DOSAGE GIVEN: 2.58 GY
RAD ONC ARIA REFERENCE POINT SESSION DOSAGE GIVEN: 2.66 GY

## 2022-09-23 PROCEDURE — 77336 RADIATION PHYSICS CONSULT: CPT | Performed by: RADIOLOGY

## 2022-09-23 PROCEDURE — 77412 RADIATION TX DELIVERY LVL 3: CPT | Performed by: RADIOLOGY

## 2022-09-23 PROCEDURE — 77387 GUIDANCE FOR RADJ TX DLVR: CPT | Performed by: RADIOLOGY

## 2022-09-26 ENCOUNTER — TREATMENT (OUTPATIENT)
Dept: RADIATION ONCOLOGY | Facility: HOSPITAL | Age: 47
End: 2022-09-26

## 2022-09-26 LAB
RAD ONC ARIA COURSE ID: NORMAL
RAD ONC ARIA COURSE INTENT: NORMAL
RAD ONC ARIA COURSE LAST TREATMENT DATE: NORMAL
RAD ONC ARIA COURSE START DATE: NORMAL
RAD ONC ARIA COURSE TREATMENT ELAPSED DAYS: 20
RAD ONC ARIA FIRST TREATMENT DATE: NORMAL
RAD ONC ARIA PLAN FRACTIONS TREATED TO DATE: 15
RAD ONC ARIA PLAN ID: NORMAL
RAD ONC ARIA PLAN PRESCRIBED DOSE PER FRACTION: 2.66 GY
RAD ONC ARIA PLAN PRIMARY REFERENCE POINT: NORMAL
RAD ONC ARIA PLAN TOTAL FRACTIONS PRESCRIBED: 16
RAD ONC ARIA PLAN TOTAL PRESCRIBED DOSE: 4256 CGY
RAD ONC ARIA REFERENCE POINT DOSAGE GIVEN TO DATE: 37.99 GY
RAD ONC ARIA REFERENCE POINT DOSAGE GIVEN TO DATE: 38.65 GY
RAD ONC ARIA REFERENCE POINT DOSAGE GIVEN TO DATE: 39.9 GY
RAD ONC ARIA REFERENCE POINT ID: NORMAL
RAD ONC ARIA REFERENCE POINT SESSION DOSAGE GIVEN: 2.53 GY
RAD ONC ARIA REFERENCE POINT SESSION DOSAGE GIVEN: 2.58 GY
RAD ONC ARIA REFERENCE POINT SESSION DOSAGE GIVEN: 2.66 GY

## 2022-09-26 PROCEDURE — 77412 RADIATION TX DELIVERY LVL 3: CPT | Performed by: RADIOLOGY

## 2022-09-26 PROCEDURE — 77387 GUIDANCE FOR RADJ TX DLVR: CPT | Performed by: RADIOLOGY

## 2022-09-27 ENCOUNTER — TREATMENT (OUTPATIENT)
Dept: RADIATION ONCOLOGY | Facility: HOSPITAL | Age: 47
End: 2022-09-27

## 2022-09-27 DIAGNOSIS — F41.1 GENERALIZED ANXIETY DISORDER: Chronic | ICD-10-CM

## 2022-09-27 DIAGNOSIS — F33.1 MAJOR DEPRESSIVE DISORDER, RECURRENT EPISODE, MODERATE: Chronic | ICD-10-CM

## 2022-09-27 LAB
RAD ONC ARIA COURSE ID: NORMAL
RAD ONC ARIA COURSE INTENT: NORMAL
RAD ONC ARIA COURSE LAST TREATMENT DATE: NORMAL
RAD ONC ARIA COURSE START DATE: NORMAL
RAD ONC ARIA COURSE TREATMENT ELAPSED DAYS: 21
RAD ONC ARIA FIRST TREATMENT DATE: NORMAL
RAD ONC ARIA PLAN FRACTIONS TREATED TO DATE: 16
RAD ONC ARIA PLAN ID: NORMAL
RAD ONC ARIA PLAN PRESCRIBED DOSE PER FRACTION: 2.66 GY
RAD ONC ARIA PLAN PRIMARY REFERENCE POINT: NORMAL
RAD ONC ARIA PLAN TOTAL FRACTIONS PRESCRIBED: 16
RAD ONC ARIA PLAN TOTAL PRESCRIBED DOSE: 4256 CGY
RAD ONC ARIA REFERENCE POINT DOSAGE GIVEN TO DATE: 40.53 GY
RAD ONC ARIA REFERENCE POINT DOSAGE GIVEN TO DATE: 41.23 GY
RAD ONC ARIA REFERENCE POINT DOSAGE GIVEN TO DATE: 42.56 GY
RAD ONC ARIA REFERENCE POINT ID: NORMAL
RAD ONC ARIA REFERENCE POINT SESSION DOSAGE GIVEN: 2.53 GY
RAD ONC ARIA REFERENCE POINT SESSION DOSAGE GIVEN: 2.58 GY
RAD ONC ARIA REFERENCE POINT SESSION DOSAGE GIVEN: 2.66 GY

## 2022-09-27 PROCEDURE — 77412 RADIATION TX DELIVERY LVL 3: CPT | Performed by: RADIOLOGY

## 2022-09-27 PROCEDURE — 77387 GUIDANCE FOR RADJ TX DLVR: CPT | Performed by: RADIOLOGY

## 2022-09-27 RX ORDER — ESCITALOPRAM OXALATE 20 MG/1
20 TABLET ORAL DAILY
Qty: 90 TABLET | Refills: 1 | Status: SHIPPED | OUTPATIENT
Start: 2022-09-27 | End: 2023-01-31

## 2022-09-28 LAB
RAD ONC ARIA COURSE END DATE: NORMAL
RAD ONC ARIA COURSE ID: NORMAL
RAD ONC ARIA COURSE INTENT: NORMAL
RAD ONC ARIA COURSE LAST TREATMENT DATE: NORMAL
RAD ONC ARIA COURSE START DATE: NORMAL
RAD ONC ARIA COURSE TREATMENT ELAPSED DAYS: 21
RAD ONC ARIA FIRST TREATMENT DATE: NORMAL
RAD ONC ARIA PLAN FRACTIONS TREATED TO DATE: 16
RAD ONC ARIA PLAN ID: NORMAL
RAD ONC ARIA PLAN NAME: NORMAL
RAD ONC ARIA PLAN PRESCRIBED DOSE PER FRACTION: 2.66 GY
RAD ONC ARIA PLAN PRIMARY REFERENCE POINT: NORMAL
RAD ONC ARIA PLAN TOTAL FRACTIONS PRESCRIBED: 16
RAD ONC ARIA PLAN TOTAL PRESCRIBED DOSE: 4256 CGY
RAD ONC ARIA REFERENCE POINT DOSAGE GIVEN TO DATE: 40.53 GY
RAD ONC ARIA REFERENCE POINT DOSAGE GIVEN TO DATE: 41.23 GY
RAD ONC ARIA REFERENCE POINT DOSAGE GIVEN TO DATE: 42.56 GY
RAD ONC ARIA REFERENCE POINT ID: NORMAL

## 2022-09-28 RX ORDER — PRAVASTATIN SODIUM 20 MG
20 TABLET ORAL NIGHTLY
Qty: 90 TABLET | Refills: 0 | Status: SHIPPED | OUTPATIENT
Start: 2022-09-28 | End: 2022-12-27

## 2022-10-27 ENCOUNTER — APPOINTMENT (OUTPATIENT)
Dept: RADIATION ONCOLOGY | Facility: HOSPITAL | Age: 47
End: 2022-10-27

## 2022-10-27 ENCOUNTER — OFFICE VISIT (OUTPATIENT)
Dept: RADIATION ONCOLOGY | Facility: HOSPITAL | Age: 47
End: 2022-10-27

## 2022-10-27 VITALS
OXYGEN SATURATION: 98 % | BODY MASS INDEX: 46.67 KG/M2 | DIASTOLIC BLOOD PRESSURE: 82 MMHG | SYSTOLIC BLOOD PRESSURE: 121 MMHG | WEIGHT: 293 LBS | HEART RATE: 68 BPM | TEMPERATURE: 97.5 F

## 2022-10-27 DIAGNOSIS — D05.11 DUCTAL CARCINOMA IN SITU (DCIS) OF RIGHT BREAST: Primary | ICD-10-CM

## 2022-10-27 DIAGNOSIS — C50.919 MALIGNANT NEOPLASM OF FEMALE BREAST, UNSPECIFIED ESTROGEN RECEPTOR STATUS, UNSPECIFIED LATERALITY, UNSPECIFIED SITE OF BREAST: ICD-10-CM

## 2022-10-27 PROCEDURE — 99024 POSTOP FOLLOW-UP VISIT: CPT | Performed by: RADIOLOGY

## 2022-10-27 PROCEDURE — G0463 HOSPITAL OUTPT CLINIC VISIT: HCPCS | Performed by: RADIOLOGY

## 2022-10-27 RX ORDER — TAMOXIFEN CITRATE 20 MG/1
TABLET ORAL DAILY
COMMUNITY

## 2022-10-27 RX ORDER — VENLAFAXINE HYDROCHLORIDE 75 MG/1
75 CAPSULE, EXTENDED RELEASE ORAL DAILY
COMMUNITY
End: 2023-01-31

## 2022-10-27 NOTE — PROGRESS NOTES
Subjective     Yary Anderson MD     Cancer Staging   No matching staging information was found for the patient.   Chief Complaint   Patient presents with   • Follow-up    4 week follow up                                S:    I had the pleasure of seeing Radha [facility]re  [facility]. She is a pleasant 47 year old female with DCIS right breast, ER MA Positive with widely negative margins She returns today for follow up now 4 weeks out from completion of her radiation therapy to the right breast.  She has been doing well since I last saw her.  She completed a dose of 4256cGy in 16 fractions on 9/27/22.    She started the tamoxifen a few weeks ago and is tolerating well.     Review of Systems   Constitutional: Negative.    Psychiatric/Behavioral: Negative.          Past Medical History:   Diagnosis Date   • Abnormal ECG    • Acid reflux     ON OCC   • Anesthesia complication     BP DROPPED DUE TO EPIDURAL FROM C-SECTIONS   • Anxiety    • Asthma 2017    Occasional reactive asthma/bronchitis after respiratory illness   • At risk for sleep apnea     STOP BANG 5   • Breast cancer (HCC) 05/13/2022    Right breast ductal carcinoma in situ, ER/MA positive, Ki-67 5%   • Depression 2004    Postpartum with 3 births   • Deviated septum    • Fatigue    • Fissure, anal    • History of UTI    • Hyperlipidemia    • Hypertension 2017    noted at last few urgent care visits   • Insomnia    • Migraine    • Obesity    • Pain in elbow joint     bilateral   • PVC (premature ventricular contraction) 9/8/2022   • Rectal bleeding     RELATED TO RECTAL FISSUE   • Seasonal allergic rhinitis          Past Surgical History:   Procedure Laterality Date   • BREAST BIOPSY Right 05/16/2022    Tomosynthesis guided Mammotome vacuum assisted right breast 4 o'clock position-Dr. Desmond Zamarripa, [facility]   • BREAST LUMPECTOMY Right 7/20/2022    Procedure: RIGHT BREAST WIRE LOCALIZED LUMPECTOMY FOR DCIS;  Surgeon: Daisy Romero MD;   Location: [facility];  Service: General;  Laterality: Right;   • BREAST SURGERY Bilateral 2022    Procedure: RIGHT BREAST ONCOPLASTIC CLOSURE LEFT BREST REDUCTION FOR SYMMETRY;  Surgeon: Rebecca Bhakta MD;  Location: [facility] OR;  Service: Plastics;  Laterality: Bilateral;   •  SECTION N/A     x 3   • D & C HYSTEROSCOPY ENDOMETRIAL ABLATION N/A 2018    Procedure: DILATATION AND CURETTAGE HYSTEROSCOPY NOVASURE ENDOMETRIAL ABLATION;  Surgeon: Puneet Carreno MD;  Location: [facility];  Service: Obstetrics/Gynecology   • MOHS SURGERY Left     Left Elbow & Left Scalp   • SKIN LESION EXCISION Right     Right Breast, Benign   • TUBAL ABDOMINAL LIGATION Bilateral 2013    after last    • WISDOM TOOTH EXTRACTION Bilateral          Social History     Socioeconomic History   • Marital status:    Tobacco Use   • Smoking status: Former     Packs/day: 1.00     Years: 5.00     Pack years: 5.00     Types: Cigarettes     Quit date: 2001     Years since quittin.4   • Smokeless tobacco: Never   Vaping Use   • Vaping Use: Never used   Substance and Sexual Activity   • Alcohol use: Yes     Alcohol/week: 2.0 standard drinks     Types: 2 Drinks containing 0.5 oz of alcohol per week     Comment: Socially; few times a month   • Drug use: Never   • Sexual activity: Yes     Partners: Male     Birth control/protection: Surgical         Family History   Problem Relation Age of Onset   • Anxiety disorder Mother    • Depression Mother    • Stroke Mother    • Skin cancer Mother         BCE SCE   • Colon polyps Mother    • Breast cancer Mother 74        DCIS with invasive. DCIS Left Breast 2:30 position ER/NC +, Her2-. Left Breast UOQ Invasive DCIS is multifocal, Two benign sentinel lymph nodes, Pathologic stage: pT1a, N(sn)0.   • Arthritis Mother    • Cancer Mother         DCIS and invasive   • Alcohol abuse Brother    • Autism Son    • Learning disabilities Son         Autism   •  Depression Son    • Depression Maternal Grandmother    • Anxiety disorder Maternal Grandmother    • Hyperlipidemia Maternal Grandmother    • Hypertension Maternal Grandmother    • Heart disease Maternal Grandmother    • Arthritis Maternal Grandmother    • Stroke Maternal Grandmother    • Coronary artery disease Maternal Grandfather    • Esophageal cancer Paternal Grandfather 85   • Colon cancer Neg Hx    • Malig Hyperthermia Neg Hx           Objective    Physical Exam  Constitutional:       Appearance: Normal appearance.   Chest:          Comments: No erythema or hyperpigmentation over right breast, no palpable masses appreciated  Neurological:      Mental Status: She is alert.           Current Outpatient Medications on File Prior to Visit   Medication Sig Dispense Refill   • ASPIRIN 81 PO Take 81 mg by mouth Daily.     • cetirizine (zyrTEC) 10 MG tablet Take 10 mg by mouth Daily. Rotates around with other allergy medicaitons     • Cholecalciferol (Vitamin D High Potency) 25 MCG (1000 UT) capsule Take 1,000 Units by mouth Daily.     • escitalopram (LEXAPRO) 20 MG tablet Take 1 tablet by mouth Daily. 90 tablet 1   • hydrOXYzine (ATARAX) 25 MG tablet TAKE ONE TABLET BY MOUTH ONCE NIGHTLY AS NEEDED FOR ANXIETY (Patient taking differently: Take 25 mg by mouth At Night As Needed for Anxiety (SLEEP).) 30 tablet 3   • ipratropium-albuterol (DUO-NEB) 0.5-2.5 mg/3 ml nebulizer Take 3 mL by nebulization Every 4 (Four) Hours As Needed for Wheezing or Shortness of Air. (Patient taking differently: Take 3 mL by nebulization Every 4 (Four) Hours As Needed for Wheezing or Shortness of Air. prn) 360 mL 2   • losartan (COZAAR) 50 MG tablet Take 50 mg by mouth Daily.     • metoprolol tartrate (LOPRESSOR) 50 MG tablet Take 1 tablet by mouth 2 (Two) Times a Day. 180 tablet 1   • multivitamin with minerals tablet tablet Take 1 tablet by mouth Daily.     • pravastatin (PRAVACHOL) 20 MG tablet Take 1 tablet by mouth Every Night. 90  tablet 0   • [DISCONTINUED] Fluticasone Furoate-Vilanterol (Breo Ellipta) 200-25 MCG/INH inhaler Inhale 1 puff Daily. 1 each 0     No current facility-administered medications on file prior to visit.       ALLERGIES:    Allergies   Allergen Reactions   • Bupropion Palpitations     POSSIBLE   • Adhesive Tape Rash     SWELLS. Localized if left on for extended periods of time       /82   Pulse 68   Temp 97.5 °F (36.4 °C)   Wt 135 kg (298 lb)   SpO2 98%   BMI 46.67 kg/m²      No flowsheet data found.      Assessment & Plan     47 year old female with DCIS right breast, ER OH Positive with widely negative margins now 4 weeks out from adjuvant radiation and doing well.  She will due for her yearly mammogram in March 2023 and follow up with Dr. Romero shortly after.  She will have regular follow up with her medical oncologist.  I will see her back in one year for follow up.  She knows to call for this appointment.             Thank you very much for allowing me to participate in the care of this very pleasant patient.    Sincerely,      Yary Anderson MD     Subjective     Yary Anderson MD

## 2022-11-20 ENCOUNTER — DOCUMENTATION (OUTPATIENT)
Dept: RADIATION ONCOLOGY | Facility: HOSPITAL | Age: 47
End: 2022-11-20

## 2022-11-20 DIAGNOSIS — D05.11 DUCTAL CARCINOMA IN SITU (DCIS) OF RIGHT BREAST: Primary | ICD-10-CM

## 2022-11-20 NOTE — PROGRESS NOTES
Subjective     Referring: Daisy Romero MD    Cancer Staging   DCIS right breast, [id number]    Dear Dr. Romero:      I am writing to let you know   has recently completed the radiation therapy portion of treatment for cancer.  Her treatment course is summarized below:    Site Treated:   Right breast      Dates Of Treatment:  9/6/22-9/27/22    Elapsed Days:  21    Total Dose and Treatment Technique:     She received a total dose of 4256cGy in 16 fractions to the right breast delivered with tangential fields using 6 and 18 MV photons.      Patient Tolerance and Response to Treatment:     She tolerated her  treatments well.   She had no breaks in the course of treatment.  She developed mild erythema over right breast and mild to moderate fatigue near the completion        Status of Tumor/Patient at Completion of Therapy:  kps 90% no evidence of disease    Disposition:  Follow up 4 weeks or sooner if necessary.  I have also placed a referral to the Survivorship clinic.

## 2022-12-20 ENCOUNTER — OFFICE VISIT (OUTPATIENT)
Dept: OBSTETRICS AND GYNECOLOGY | Age: 47
End: 2022-12-20

## 2022-12-20 VITALS
DIASTOLIC BLOOD PRESSURE: 76 MMHG | SYSTOLIC BLOOD PRESSURE: 128 MMHG | HEIGHT: 67 IN | BODY MASS INDEX: 44.73 KG/M2 | WEIGHT: 285 LBS

## 2022-12-20 DIAGNOSIS — N95.1 PERIMENOPAUSAL: ICD-10-CM

## 2022-12-20 DIAGNOSIS — N92.6 IRREGULAR MENSES: Primary | ICD-10-CM

## 2022-12-20 DIAGNOSIS — D05.11 DUCTAL CARCINOMA IN SITU (DCIS) OF RIGHT BREAST: ICD-10-CM

## 2022-12-20 PROCEDURE — 99213 OFFICE O/P EST LOW 20 MIN: CPT | Performed by: OBSTETRICS & GYNECOLOGY

## 2022-12-20 RX ORDER — LORAZEPAM 1 MG/1
TABLET ORAL
COMMUNITY
Start: 2022-12-01

## 2022-12-20 RX ORDER — LISDEXAMFETAMINE DIMESYLATE 30 MG/1
CAPSULE ORAL
COMMUNITY
Start: 2022-12-02

## 2022-12-20 NOTE — PROGRESS NOTES
Subjective       History of Present Illness  Radha Torre is a 47 y.o. female is being seen today for irregular bleeding.  Patient is 47 years of age, having some perimenopausal symptoms.  She has had an endometrial ablation in the past so it is hard to fully assess what her bleeding pattern is.  But she states she has not had any bleeding now for about a month.  She is also on tamoxifen    Chief Complaint   Patient presents with   • Follow-up     C/O break through bleeding, would like to talk about side effects of Tamifaxin   .        The following portions of the patient's history were reviewed and updated as appropriate: allergies, current medications, past family history, past medical history, past social history, past surgical history and problem list.    PAST MEDICAL HISTORY  Past Medical History:   Diagnosis Date   • Abnormal ECG    • Acid reflux     ON OCC   • Anesthesia complication     BP DROPPED DUE TO EPIDURAL FROM C-SECTIONS   • Anxiety    • Asthma 2017    Occasional reactive asthma/bronchitis after respiratory illness   • At risk for sleep apnea     STOP BANG 5   • Breast cancer (HCC) 2022    Right breast ductal carcinoma in situ, ER/DE positive, Ki-67 5%   • Depression     Postpartum with 3 births   • Deviated septum    • Fatigue    • Fissure, anal    • History of UTI    • Hyperlipidemia    • Hypertension 2017    noted at last few urgent care visits   • Insomnia    • Migraine    • Obesity    • Pain in elbow joint     bilateral   • PVC (premature ventricular contraction) 2022   • Rectal bleeding     RELATED TO RECTAL FISSUE   • Seasonal allergic rhinitis      OB History    Para Term  AB Living   3 3 3     3   SAB IAB Ectopic Molar Multiple Live Births             3      # Outcome Date GA Lbr Benny/2nd Weight Sex Delivery Anes PTL Lv   3 Term 13   3487 g (7 lb 11 oz) M CS-LTranv   [place]   2 Term 10/09/08   4026 g (8 lb 14 oz) F CS-LTranv   [place]KA   1 Term    3941 g  (8 lb 11 oz) M CS-LTranv   [place]KA     Past Surgical History:   Procedure Laterality Date   • BREAST BIOPSY Right 2022    Tomosynthesis guided Mammotome vacuum assisted right breast 4 o'clock position-Dr. Desmond Zamarripa, [facility]   • BREAST LUMPECTOMY Right 2022    Procedure: RIGHT BREAST WIRE LOCALIZED LUMPECTOMY FOR DCIS;  Surgeon: Daisy Romero MD;  Location: [facility] OR;  Service: General;  Laterality: Right;   • BREAST SURGERY Bilateral 2022    Procedure: RIGHT BREAST ONCOPLASTIC CLOSURE LEFT BREST REDUCTION FOR SYMMETRY;  Surgeon: Rebecca Bhakta MD;  Location: [facility] OR;  Service: Plastics;  Laterality: Bilateral;   •  SECTION N/A     x 3   • D & C HYSTEROSCOPY ENDOMETRIAL ABLATION N/A 2018    Procedure: DILATATION AND CURETTAGE HYSTEROSCOPY NOVASURE ENDOMETRIAL ABLATION;  Surgeon: Puneet Carreno MD;  Location: [facility];  Service: Obstetrics/Gynecology   • MOHS SURGERY Left     Left Elbow & Left Scalp   • SKIN LESION EXCISION Right     Right Breast, Benign   • TUBAL ABDOMINAL LIGATION Bilateral 2013    after last    • WISDOM TOOTH EXTRACTION Bilateral      Family History   Problem Relation Age of Onset   • Anxiety disorder Mother    • Depression Mother    • Stroke Mother    • Skin cancer Mother         BCE SCE   • Colon polyps Mother    • Breast cancer Mother 74        DCIS with invasive. DCIS Left Breast 2:30 position ER/[place] +, Her2-. Left Breast UOQ Invasive DCIS is multifocal, Two benign sentinel lymph nodes, Pathologic stage: pT1a, N(sn)0.   • Arthritis Mother    • Cancer Mother         DCIS and invasive   • Alcohol abuse Brother    • Autism Son    • Learning disabilities Son         Autism   • Depression Son    • Depression Maternal Grandmother    • Anxiety disorder Maternal Grandmother    • Hyperlipidemia Maternal Grandmother    • Hypertension Maternal Grandmother    • Heart disease Maternal Grandmother    • Arthritis Maternal Grandmother    •  Stroke Maternal Grandmother    • Coronary artery disease Maternal Grandfather    • Esophageal cancer Paternal Grandfather 85   • Colon cancer Neg Hx    • Malig Hyperthermia Neg Hx      Social History     Tobacco Use   Smoking Status Former   • Packs/day: 1.00   • Years: 5.00   • Pack years: 5.00   • Types: Cigarettes   • Quit date: 2001   • Years since quittin.6   Smokeless Tobacco Never       Current Outpatient Medications:   •  ASPIRIN 81 PO, Take 81 mg by mouth Daily., Disp: , Rfl:   •  cetirizine (zyrTEC) 10 MG tablet, Take 10 mg by mouth Daily. Rotates around with other allergy medicaitons, Disp: , Rfl:   •  Cholecalciferol (Vitamin D High Potency) 25 MCG (1000 UT) capsule, Take 1,000 Units by mouth Daily., Disp: , Rfl:   •  ipratropium-albuterol (DUO-NEB) 0.5-2.5 mg/3 ml nebulizer, Take 3 mL by nebulization Every 4 (Four) Hours As Needed for Wheezing or Shortness of Air. (Patient taking differently: Take 3 mL by nebulization Every 4 (Four) Hours As Needed for Wheezing or Shortness of Air. prn), Disp: 360 mL, Rfl: 2  •  LORazepam (ATIVAN) 1 MG tablet, , Disp: , Rfl:   •  losartan (COZAAR) 50 MG tablet, Take 50 mg by mouth Daily., Disp: , Rfl:   •  metoprolol tartrate (LOPRESSOR) 50 MG tablet, Take 1 tablet by mouth 2 (Two) Times a Day., Disp: 180 tablet, Rfl: 1  •  multivitamin with minerals tablet tablet, Take 1 tablet by mouth Daily., Disp: , Rfl:   •  pravastatin (PRAVACHOL) 20 MG tablet, Take 1 tablet by mouth Every Night., Disp: 90 tablet, Rfl: 0  •  tamoxifen (NOLVADEX) 20 MG chemo tablet, Take  by mouth Daily., Disp: , Rfl:   •  venlafaxine XR (EFFEXOR-XR) 75 MG 24 hr capsule, Take 1 capsule by mouth Daily., Disp: , Rfl:   •  Vyvanse 30 MG capsule, , Disp: , Rfl:   •  escitalopram (LEXAPRO) 20 MG tablet, Take 1 tablet by mouth Daily., Disp: 90 tablet, Rfl: 1  •  hydrOXYzine (ATARAX) 25 MG tablet, TAKE ONE TABLET BY MOUTH ONCE NIGHTLY AS NEEDED FOR ANXIETY (Patient taking differently: Take 25 mg  by mouth At Night As Needed for Anxiety (SLEEP).), Disp: 30 tablet, Rfl: 3  Immunization History   Administered Date(s) Administered   • COVID-19 (MODERNA) 1st, 2nd, 3rd Dose Only 01/29/2021, 02/26/2021, 01/03/2022   • FluLaval/Fluzone >6mos 10/21/2019   • Influenza, Unspecified 10/21/2019   • Tdap 01/01/2013       Review of Systems       Except as outlined in history of physical illness, patient denies any changes in her GYN, , GI systems. All other systems reviewed are negative.    Objective   Physical Exam   Alert and oriented, respirations unlabored, heart regular rate and rhythm   Pelvic external genitalia normal vagina clean dry intact cervix normal uterus nonenlarged nontender adnexa nonenlarged nontender rectal exam normal      Assessment & Plan   Diagnoses and all orders for this visit:    1. Irregular menses (Primary)    2. Perimenopausal    3. Ductal carcinoma in situ (DCIS) of right breast    Reviewed the difficulty in assessing the situation.  Likely she is just in the perimenopausal state, but given her other risk factors including tamoxifen usage, body mass index there is concern for endometrial hyperplasia or further changes.  With a reassuring exam today we are going to order a ultrasound to look at the endometrial stripe.  Patient states she cannot take any progesterone.  Talked about management options including a H possible BSO versus observation depending on what the ultrasound shows             No orders of the defined types were placed in this encounter.          EMR Dragon/ Transcription disclaimer:  Much of the encounter note is an electronic transcription/translation of spoken language to printed text. The electronic translation of spoken language may permit erroneous, or at times, nonessential words or phrases to be inadvertently transcribes; Although i have reviewed the note for such errors, some may still exist.

## 2022-12-27 RX ORDER — PRAVASTATIN SODIUM 20 MG
TABLET ORAL
Qty: 30 TABLET | Refills: 0 | Status: SHIPPED | OUTPATIENT
Start: 2022-12-27 | End: 2023-01-23

## 2022-12-27 NOTE — TELEPHONE ENCOUNTER
Rx Refill Note  Requested Prescriptions     Pending Prescriptions Disp Refills   • pravastatin (PRAVACHOL) 20 MG tablet [Pharmacy Med Name: PRAVASTATIN SODIUM 20 MG TAB] 30 tablet      Sig: TAKE ONE TABLET BY MOUTH ONCE NIGHTLY      Last office visit with prescribing clinician: 7/1/2022   Last telemedicine visit with prescribing clinician: 1/13/2023   Next office visit with prescribing clinician: 1/13/2023   {TIP  Encounters:23}                      Would you like a call back once the refill request has been completed: [] Yes [] No    If the office needs to give you a call back, can they leave a voicemail: [] Yes [] No    Cleo Felipe Rep  12/27/22, 10:38 EST

## 2023-01-23 RX ORDER — METOPROLOL TARTRATE 50 MG/1
TABLET, FILM COATED ORAL
Qty: 60 TABLET | Refills: 0 | Status: SHIPPED | OUTPATIENT
Start: 2023-01-23 | End: 2023-02-20

## 2023-01-23 RX ORDER — PRAVASTATIN SODIUM 20 MG
TABLET ORAL
Qty: 30 TABLET | Refills: 0 | Status: SHIPPED | OUTPATIENT
Start: 2023-01-23 | End: 2023-02-20

## 2023-01-31 ENCOUNTER — OFFICE VISIT (OUTPATIENT)
Dept: OBSTETRICS AND GYNECOLOGY | Age: 48
End: 2023-01-31
Payer: COMMERCIAL

## 2023-01-31 VITALS
DIASTOLIC BLOOD PRESSURE: 82 MMHG | WEIGHT: 279 LBS | HEIGHT: 67 IN | SYSTOLIC BLOOD PRESSURE: 130 MMHG | BODY MASS INDEX: 43.79 KG/M2

## 2023-01-31 DIAGNOSIS — D50.0 IRON DEFICIENCY ANEMIA DUE TO CHRONIC BLOOD LOSS: ICD-10-CM

## 2023-01-31 DIAGNOSIS — D25.1 INTRAMURAL LEIOMYOMA OF UTERUS: ICD-10-CM

## 2023-01-31 DIAGNOSIS — N92.0 MENORRHAGIA WITH REGULAR CYCLE: Primary | ICD-10-CM

## 2023-01-31 DIAGNOSIS — D05.11 DUCTAL CARCINOMA IN SITU (DCIS) OF RIGHT BREAST: ICD-10-CM

## 2023-01-31 PROCEDURE — 99213 OFFICE O/P EST LOW 20 MIN: CPT | Performed by: OBSTETRICS & GYNECOLOGY

## 2023-01-31 RX ORDER — VENLAFAXINE HYDROCHLORIDE 150 MG/1
CAPSULE, EXTENDED RELEASE ORAL
COMMUNITY
Start: 2023-01-25

## 2023-01-31 NOTE — PROGRESS NOTES
Subjective       History of Present Illness  Radha Torre is a 47 y.o. female is being seen today for history of abnormal uterine bleeding menorrhagia.    Patient's previously been diagnosed with breast cancer.  She is also had an endometrial ablation, she has had some irregular bleeding and arrived for an ultrasound.  Fortunately the ultrasound shows the endometrial stripe to be thin less than 7 mm, uterus is essentially normal size 8 x 5 with maybe 1 very small fibroid.    Chief Complaint   Patient presents with   • Menstrual Problem     F/u on BTB since starting Tamoxifen; US today. She reports that after she made the appt, she had a 9 day episode of heavy bleeding. Not heavy like before her ablation but heavy continuous bleeding. Pt anxious today, nervous with cancer hx    .        The following portions of the patient's history were reviewed and updated as appropriate: allergies, current medications, past family history, past medical history, past social history, past surgical history and problem list.    PAST MEDICAL HISTORY  Past Medical History:   Diagnosis Date   • Abnormal ECG    • Acid reflux     ON OCC   • Anesthesia complication     BP DROPPED DUE TO EPIDURAL FROM C-SECTIONS   • Anxiety    • Asthma     Occasional reactive asthma/bronchitis after respiratory illness   • At risk for sleep apnea     STOP BANG 5   • Breast cancer (HCC) 2022    Right breast ductal carcinoma in situ, ER/OH positive, Ki-67 5%   • Depression     Postpartum with 3 births   • Deviated septum    • Fatigue    • Fissure, anal    • History of UTI    • Hyperlipidemia    • Hypertension 2017    noted at last few urgent care visits   • Insomnia    • Migraine    • Obesity    • Pain in elbow joint     bilateral   • PVC (premature ventricular contraction) 2022   • Rectal bleeding     RELATED TO RECTAL FISSUE   • Seasonal allergic rhinitis      OB History    Para Term  AB Living   3 3 3     3   SAB IAB  Ectopic Molar Multiple Live Births             3      # Outcome Date GA Lbr Benny/2nd Weight Sex Delivery Anes PTL Lv   3 Term 13   3487 g (7 lb 11 oz) M CS-LTranv   [place]KA   2 Term 10/09/08   4026 g (8 lb 14 oz) F CS-LTranv   [place]KA   1 Term    3941 g (8 lb 11 oz) M CS-LTranv   WALESKA     Past Surgical History:   Procedure Laterality Date   • BREAST BIOPSY Right 2022    Tomosynthesis guided Mammotome vacuum assisted right breast 4 o'clock position-Dr. Desmond Zamarripa, [facility]   • BREAST LUMPECTOMY Right 2022    Procedure: RIGHT BREAST WIRE LOCALIZED LUMPECTOMY FOR DCIS;  Surgeon: Daisy Romero MD;  Location: [facility];  Service: General;  Laterality: Right;   • BREAST SURGERY Bilateral 2022    Procedure: RIGHT BREAST ONCOPLASTIC CLOSURE LEFT BREST REDUCTION FOR SYMMETRY;  Surgeon: Rebecca Bhakta MD;  Location: [facility];  Service: Plastics;  Laterality: Bilateral;   •  SECTION N/A     x 3   • D & C HYSTEROSCOPY ENDOMETRIAL ABLATION N/A 2018    Procedure: DILATATION AND CURETTAGE HYSTEROSCOPY NOVASURE ENDOMETRIAL ABLATION;  Surgeon: Puneet Carreno MD;  Location: [facility];  Service: Obstetrics/Gynecology   • MOHS SURGERY Left     Left Elbow & Left Scalp   • SKIN LESION EXCISION Right     Right Breast, Benign   • TUBAL ABDOMINAL LIGATION Bilateral 2013    after last    • WISDOM TOOTH EXTRACTION Bilateral      Family History   Problem Relation Age of Onset   • Anxiety disorder Mother    • Depression Mother    • Stroke Mother    • Skin cancer Mother         BCE SCE   • Colon polyps Mother    • Breast cancer Mother 74        DCIS with invasive. DCIS Left Breast 2:30 position ER/UT +, Her2-. Left Breast UOQ Invasive DCIS is multifocal, Two benign sentinel lymph nodes, Pathologic stage: pT1a, N(sn)0.   • Arthritis Mother    • Cancer Mother         DCIS and invasive   • Alcohol abuse Brother    • Autism Son    • Learning disabilities Son         Autism   •  Depression Son    • Depression Maternal Grandmother    • Anxiety disorder Maternal Grandmother    • Hyperlipidemia Maternal Grandmother    • Hypertension Maternal Grandmother    • Heart disease Maternal Grandmother    • Arthritis Maternal Grandmother    • Stroke Maternal Grandmother    • Coronary artery disease Maternal Grandfather    • Esophageal cancer Paternal Grandfather 85   • Colon cancer Neg Hx    • Malig Hyperthermia Neg Hx      Social History     Tobacco Use   Smoking Status Former   • Packs/day: 1.00   • Years: 5.00   • Pack years: 5.00   • Types: Cigarettes   • Quit date: 2001   • Years since quittin.7   Smokeless Tobacco Never       Current Outpatient Medications:   •  ASPIRIN 81 PO, Take 81 mg by mouth Daily., Disp: , Rfl:   •  cetirizine (zyrTEC) 10 MG tablet, Take 10 mg by mouth Daily. Rotates around with other allergy medicaitons, Disp: , Rfl:   •  ipratropium-albuterol (DUO-NEB) 0.5-2.5 mg/3 ml nebulizer, Take 3 mL by nebulization Every 4 (Four) Hours As Needed for Wheezing or Shortness of Air. (Patient taking differently: Take 3 mL by nebulization Every 4 (Four) Hours As Needed for Wheezing or Shortness of Air. prn), Disp: 360 mL, Rfl: 2  •  LORazepam (ATIVAN) 1 MG tablet, , Disp: , Rfl:   •  losartan (COZAAR) 50 MG tablet, Take 50 mg by mouth Daily., Disp: , Rfl:   •  metoprolol tartrate (LOPRESSOR) 50 MG tablet, TAKE ONE TABLET BY MOUTH TWICE A DAY, Disp: 60 tablet, Rfl: 0  •  multivitamin with minerals tablet tablet, Take 1 tablet by mouth Daily., Disp: , Rfl:   •  pravastatin (PRAVACHOL) 20 MG tablet, TAKE ONE TABLET BY MOUTH ONCE NIGHTLY, Disp: 30 tablet, Rfl: 0  •  tamoxifen (NOLVADEX) 20 MG chemo tablet, Take  by mouth Daily., Disp: , Rfl:   •  venlafaxine XR (EFFEXOR-XR) 150 MG 24 hr capsule, , Disp: , Rfl:   •  Vyvanse 30 MG capsule, , Disp: , Rfl:   Immunization History   Administered Date(s) Administered   • COVID-19 (MODERNA) 1st, 2nd, 3rd Dose Only 2021, 2021,  01/03/2022   • FluLaval/Fluzone >6mos 10/21/2019   • Influenza, Unspecified 10/21/2019   • Tdap 01/01/2013       Review of Systems       Except as outlined in history of physical illness, patient denies any changes in her GYN, , GI systems. All other systems reviewed are negative.    Objective   Physical Exam   Alert and oriented, respirations unlabored, heart regular rate and rhythm   Pelvic see ultrasound from today      Assessment & Plan   Diagnoses and all orders for this visit:    1. Menorrhagia with regular cycle (Primary)    2. Iron deficiency anemia due to chronic blood loss    3. Intramural leiomyoma of uterus    4. Ductal carcinoma in situ (DCIS) of right breast      With today's ultrasound showing a normal size uterus and a normal endometrial stripe patient is elected to observe her bleeding pattern for now.  Discussed taking some Naprosyn for the first couple days of her flow to see if that decreases it at all.  She is going to check with her oncologist to see if she would be allowed to take a monthly withdrawal of progesterone.  We also reviewed the options of observation versus TLH BSO.  Patient is also going to attempt to take some Aleve.  She will let us know what hematology oncology has to say and she understands the limitations of ultrasound and the different management options discussed           No orders of the defined types were placed in this encounter.          EMR Dragon/ Transcription disclaimer:  Much of the encounter note is an electronic transcription/translation of spoken language to printed text. The electronic translation of spoken language may permit erroneous, or at times, nonessential words or phrases to be inadvertently transcribes; Although i have reviewed the note for such errors, some may still exist.

## 2023-02-20 RX ORDER — PRAVASTATIN SODIUM 20 MG
TABLET ORAL
Qty: 30 TABLET | Refills: 0 | Status: SHIPPED | OUTPATIENT
Start: 2023-02-20 | End: 2023-03-20

## 2023-02-20 RX ORDER — METOPROLOL TARTRATE 50 MG/1
TABLET, FILM COATED ORAL
Qty: 60 TABLET | Refills: 0 | Status: SHIPPED | OUTPATIENT
Start: 2023-02-20 | End: 2023-03-20

## 2023-02-23 ENCOUNTER — OFFICE VISIT (OUTPATIENT)
Dept: ONCOLOGY | Facility: CLINIC | Age: 48
End: 2023-02-23
Payer: COMMERCIAL

## 2023-02-23 ENCOUNTER — LAB (OUTPATIENT)
Dept: OTHER | Facility: HOSPITAL | Age: 48
End: 2023-02-23
Payer: COMMERCIAL

## 2023-02-23 VITALS
BODY MASS INDEX: 43.93 KG/M2 | HEART RATE: 88 BPM | TEMPERATURE: 98.4 F | HEIGHT: 67 IN | RESPIRATION RATE: 18 BRPM | WEIGHT: 279.9 LBS | OXYGEN SATURATION: 98 % | DIASTOLIC BLOOD PRESSURE: 72 MMHG | SYSTOLIC BLOOD PRESSURE: 101 MMHG

## 2023-02-23 DIAGNOSIS — F41.9 ANXIETY: ICD-10-CM

## 2023-02-23 DIAGNOSIS — Z12.31 SCREENING MAMMOGRAM, ENCOUNTER FOR: ICD-10-CM

## 2023-02-23 DIAGNOSIS — D05.11 DUCTAL CARCINOMA IN SITU (DCIS) OF RIGHT BREAST: Primary | ICD-10-CM

## 2023-02-23 PROCEDURE — 99215 OFFICE O/P EST HI 40 MIN: CPT | Performed by: INTERNAL MEDICINE

## 2023-02-23 NOTE — PROGRESS NOTES
Subjective   Radha Torre is a 47 y.o. female.  Referred by Sofia Mora for right breast ductal carcinoma in situ    History of Present Illness   Mr. Torre is a 47-year-old premenopausal  lady who presented with a screen detected abnormality of the right breast in March 2022.  She has had previous normal screening mammograms.  This mammogram had been delayed by about 8 months due to COVID-19.    3/7/2022-bilateral screening mammogram  Indeterminate left breast asymmetry.  Further evaluation recommended.  Indeterminate right breast calcifications.  Further evaluation recommended.    4/18/2022-bilateral diagnostic mammogram and ultrasound  The area of focal asymmetry in the middle third of the left breast effaces and consistent with normal breast parenchyma  Right breast-presence of cluster of microcalcifications in the middle third lower inner quadrant.  Right breast finding suspicious for malignancy.  Stereotactic guided biopsy recommended.    5/13/2022-right breast ear tactic biopsy  Pathology consistent with ductal carcinoma in situ  Intermediate grade  ER +85% strong  RI +85% strong    5/20/2022-bilateral breast MRI  There is a 2.1 cm biopsy cavity with two 1 cm focal areas of enhancement along the cavity margins representing the site of biopsy-proven malignancy.  Residual calcifications measure 1.4 cm on postbiopsy mammogram.  No MRI evidence of left breast malignancy.    5/25/2022-she has been evaluated by Dr. Romero and has been recommended to undergo a right breast lumpectomy.  Lumpectomy has not been scheduled yet.  Pending plastics evaluation by Dr. Bhakta.    5/29/2022-Invitae 9 gene stat panel negative.    Family history significant for diagnosis of breast cancer in her mother at the age of 74.  Her mother is a patient of mine, Ms. Marce Bell.   No other family history of breast ovarian pancreatic prostate carcinoma.  No history of melanomas.    Radha denies palpating any new  breast masses, nipple changes, skin changes or nipple discharge.    7/20/2022-right breast lumpectomy and bilateral breast reduction  Ductal carcinoma in situ measuring 10 mm  Grade 2  ER/MT positive  All margins negative    She completed adjuvant radiation to the right breast and started tamoxifen in September 2022.    Interval history  She is tolerating tamoxifen fairly well.  She had menorrhagia earlier this year.  She had a uterine ablation in the past about 5 to 6 years ago and has not had menstrual cycle since.  She was seen by Dr. Medeiros her gynecologist who performed a transvaginal ultrasound and the endometrium was not thickened.  He recommended progesterone to help with withdrawal.  After the one-time of bleeding she has not had any recurrent episodes of the same.  She denies any new breast masses.    She has trouble with severe anxiety in the past few months and had to go to the couch and has been started on Ativan and she is also on Effexor for the same.  Anxiety is not very well controlled.  She does report that she has 2 teenage kids and some of this could be situational.      The following portions of the patient's history were reviewed and updated as appropriate: allergies, current medications, past family history, past medical history, past social history, past surgical history and problem list.    Past Medical History:   Diagnosis Date   • Abnormal ECG    • Acid reflux     ON OCC   • Anesthesia complication     BP DROPPED DUE TO EPIDURAL FROM C-SECTIONS   • Anxiety    • Asthma 2017    Occasional reactive asthma/bronchitis after respiratory illness   • At risk for sleep apnea     STOP BANG 5   • Breast cancer (HCC) 05/13/2022    Right breast ductal carcinoma in situ, ER/MT positive, Ki-67 5%   • Depression 2004    Postpartum with 3 births   • Deviated septum    • Fatigue    • Fissure, anal    • History of UTI    • Hyperlipidemia    • Hypertension 2017    noted at last few urgent care visits   •  Insomnia    • Migraine    • Obesity    • Pain in elbow joint     bilateral   • PVC (premature ventricular contraction) 2022   • Rectal bleeding     RELATED TO RECTAL FISSUE   • Seasonal allergic rhinitis         Past Surgical History:   Procedure Laterality Date   • BREAST BIOPSY Right 2022    Tomosynthesis guided Mammotome vacuum assisted right breast 4 o'clock position-Dr. Desmond Zamarripa, [facility]   • BREAST LUMPECTOMY Right 2022    Procedure: RIGHT BREAST WIRE LOCALIZED LUMPECTOMY FOR DCIS;  Surgeon: Daisy Romero MD;  Location: [facility];  Service: General;  Laterality: Right;   • BREAST SURGERY Bilateral 2022    Procedure: RIGHT BREAST ONCOPLASTIC CLOSURE LEFT BREST REDUCTION FOR SYMMETRY;  Surgeon: Rebecca Bhakta MD;  Location: [facility];  Service: Plastics;  Laterality: Bilateral;   •  SECTION N/A     x 3   • D & C HYSTEROSCOPY ENDOMETRIAL ABLATION N/A 2018    Procedure: DILATATION AND CURETTAGE HYSTEROSCOPY NOVASURE ENDOMETRIAL ABLATION;  Surgeon: Puneet Carreno MD;  Location: [facility];  Service: Obstetrics/Gynecology   • MOHS SURGERY Left     Left Elbow & Left Scalp   • SKIN LESION EXCISION Right     Right Breast, Benign   • TUBAL ABDOMINAL LIGATION Bilateral 2013    after last    • WISDOM TOOTH EXTRACTION Bilateral         Family History   Problem Relation Age of Onset   • Anxiety disorder Mother    • Depression Mother    • Stroke Mother    • Skin cancer Mother         BCE SCE   • Colon polyps Mother    • Breast cancer Mother 74        DCIS with invasive. DCIS Left Breast 2:30 position ER/SC +, Her2-. Left Breast UOQ Invasive DCIS is multifocal, Two benign sentinel lymph nodes, Pathologic stage: pT1a, N(sn)0.   • Arthritis Mother    • Cancer Mother         DCIS and invasive   • Alcohol abuse Brother    • Autism Son    • Learning disabilities Son         Autism   • Depression Son    • Depression Maternal Grandmother    • Anxiety disorder  "Maternal Grandmother    • Hyperlipidemia Maternal Grandmother    • Hypertension Maternal Grandmother    • Heart disease Maternal Grandmother    • Arthritis Maternal Grandmother    • Stroke Maternal Grandmother    • Coronary artery disease Maternal Grandfather    • Esophageal cancer Paternal Grandfather 85   • Colon cancer Neg Hx    • Malig Hyperthermia Neg Hx         Social History     Socioeconomic History   • Marital status:    Tobacco Use   • Smoking status: Former     Packs/day: 1.00     Years: 5.00     Pack years: 5.00     Types: Cigarettes     Quit date: 2001     Years since quittin.8   • Smokeless tobacco: Never   Vaping Use   • Vaping Use: Never used   Substance and Sexual Activity   • Alcohol use: Yes     Alcohol/week: 2.0 standard drinks     Types: 2 Drinks containing 0.5 oz of alcohol per week     Comment: Socially; few times a month   • Drug use: Never   • Sexual activity: Yes     Partners: Male     Birth control/protection: Surgical        OB History        3    Para   3    Term   3            AB        Living   3       SAB        IAB        Ectopic        Molar        Multiple        Live Births   3                 Allergies   Allergen Reactions   • Bupropion Palpitations     POSSIBLE   • Adhesive Tape Rash     SWELLS. Localized if left on for extended periods of time            Review of Systems   Constitutional: Negative.    HENT: Negative.    Eyes: Negative.    Respiratory: Negative.    Genitourinary: Negative.    Musculoskeletal: Positive for arthralgias.   Allergic/Immunologic: Negative.    Neurological: Negative.    Psychiatric/Behavioral: The patient is nervous/anxious.      Review of systems as mentioned in the HPI    Objective   Blood pressure 101/72, pulse 88, temperature 98.4 °F (36.9 °C), temperature source Infrared, resp. rate 18, height 170.2 cm (67.01\"), weight 127 kg (279 lb 14.4 oz), SpO2 98 %, not currently breastfeeding.   Physical Exam  Vitals " reviewed.   Constitutional:       Appearance: She is obese.   HENT:      Head: Normocephalic and atraumatic.      Nose: Nose normal.      Mouth/Throat:      Mouth: Mucous membranes are moist.   Eyes:      Extraocular Movements: Extraocular movements intact.      Pupils: Pupils are equal, round, and reactive to light.   Cardiovascular:      Rate and Rhythm: Normal rate and regular rhythm.      Pulses: Normal pulses.   Pulmonary:      Effort: Pulmonary effort is normal.   Musculoskeletal:         General: Normal range of motion.      Cervical back: Normal range of motion.   Neurological:      General: No focal deficit present.      Mental Status: She is alert and oriented to person, place, and time.   Psychiatric:         Mood and Affect: Mood normal.         Behavior: Behavior normal.         Thought Content: Thought content normal.         Judgment: Judgment normal.       Breast Exam: On inspection there is changes consistent with mastopexy.  There is underlying scar tissue.  There is some tenderness to palpation in the right lower outer quadrant at the site of the scar.  No other new palpable concerns.    No visits with results within 30 Day(s) from this visit.   Latest known visit with results is:   Orders Only on 09/28/2022   Component Date Value Ref Range Status   • Course ID 09/28/2022 C1 Rt Breast   Final   • Course Intent 09/28/2022 Curative   Final   • Course Start Date 09/28/2022 8/18/2022 11:51 AM   Final   • Course End Date 09/28/2022 9/28/2022  7:32 AM   Final   • Course First Treatment Date 09/28/2022 9/6/2022  2:41 PM   Final   • Course Last Treatment Date 09/28/2022 9/27/2022  4:51 PM   Final   • Course Elapsed Days 09/28/2022 21   Final   • Reference Point ID 09/28/2022 calc   Final   • Reference Point Dosage Given to Da* 09/28/2022 [id number]  Gy Final   • Reference Point ID 09/28/2022 calc Rt Breast   Final   • Reference Point Dosage Given to Da* 09/28/2022 [id number]  Gy Final   • Reference Point  ID 09/28/2022 RX Rt Breast   Final   • Reference Point Dosage Given to Da* 09/28/2022 42.56  Gy Final   • Plan ID 09/28/2022 Rt Breast_FiF   Final   • Plan Name 09/28/2022 Rt Breast_FiF   Final   • Plan Fractions Treated to Date 09/28/2022 16   Final   • Plan Total Fractions Prescribed 09/28/2022 16   Final   • Plan Prescribed Dose Per Fraction 09/28/2022 2.66  Gy Final   • Plan Total Prescribed Dose 09/28/2022 4,256  cGy Final   • Plan Primary Reference Point 09/28/2022 RX Rt Breast   Final        No radiology results for the last 30 days.   Screening mammogram, bilateral diagnostic mammogram, breast MRI-images independently reviewed and interpreted by me.  Details summarized in the HPI.     8/4/2022-labs reviewed and mild leukocytosis.    Assessment & Plan       *Ductal carcinoma in situ of the right breast  · ER +85% strong, VA +85% strong, intermediate grade  · Status post right breast lumpectomy and bilateral breast reduction  · Pathology showed DCIS measuring 10 mm, margins negative  · Tamoxifen started September 2022.  · Has some hot flashes and reports worsening anxiety  · No new breast masses.  · She will be due for mammogram next month and this will be ordered today    *Anxiety/depression  · She is currently on Effexor to help with anxiety  · Also on Ativan  · Seeing the couch to help with worsening of anxiety  · Supportive oncology referral made  · Also on Vyvanse    *Bilateral elbow pain  · Chronic and unchanged  · Could be positional     *Obesity  · BMI 43.8  · Reports decreased appetite since being started on Vyvanse and reports feeling better from standpoint.  41 minutes spent on the encounter including reviewing the medical records, face-to-face time and documentation of the same.  ·     *Follow-up-6 months

## 2023-03-06 ENCOUNTER — TELEPHONE (OUTPATIENT)
Dept: OTHER | Facility: HOSPITAL | Age: 48
End: 2023-03-06
Payer: COMMERCIAL

## 2023-03-06 NOTE — TELEPHONE ENCOUNTER
Oncology Social Work    Referral received from Dr. Sharma for supportive oncology services.  OSW called and left voicemail message for patient to return call.      Mare Goss, DENYW, CSW

## 2023-03-15 ENCOUNTER — HOSPITAL ENCOUNTER (OUTPATIENT)
Dept: MAMMOGRAPHY | Facility: HOSPITAL | Age: 48
Discharge: HOME | End: 2023-03-15
Admitting: INTERNAL MEDICINE
Payer: COMMERCIAL

## 2023-03-15 DIAGNOSIS — D05.11 DUCTAL CARCINOMA IN SITU (DCIS) OF RIGHT BREAST: ICD-10-CM

## 2023-03-15 DIAGNOSIS — Z12.31 SCREENING MAMMOGRAM, ENCOUNTER FOR: ICD-10-CM

## 2023-03-15 PROCEDURE — 77067 SCR MAMMO BI INCL CAD: CPT

## 2023-03-15 PROCEDURE — 77063 BREAST TOMOSYNTHESIS BI: CPT

## 2023-03-20 RX ORDER — METOPROLOL TARTRATE 50 MG/1
TABLET, FILM COATED ORAL
Qty: 60 TABLET | Refills: 0 | Status: SHIPPED | OUTPATIENT
Start: 2023-03-20

## 2023-03-20 RX ORDER — PRAVASTATIN SODIUM 20 MG
TABLET ORAL
Qty: 30 TABLET | Refills: 0 | Status: SHIPPED | OUTPATIENT
Start: 2023-03-20

## 2023-04-11 ENCOUNTER — TELEPHONE (OUTPATIENT)
Dept: FAMILY MEDICINE CLINIC | Facility: CLINIC | Age: 48
End: 2023-04-11

## 2023-04-11 NOTE — TELEPHONE ENCOUNTER
Caller: Radha Torre    Relationship: Self    Best call back number: [phone]    What orders are you requesting (i.e. lab or imaging): LABS    In what timeframe would the patient need to come in: ASAP    Where will you receive your lab/imaging services: IN OFFICE     Additional notes: PATIENT WANTS TO DO THESE BEFORE UPCOMING APPOINTMENT

## 2023-04-12 DIAGNOSIS — Z00.00 ROUTINE GENERAL MEDICAL EXAMINATION AT A HEALTH CARE FACILITY: Primary | ICD-10-CM

## 2023-04-17 ENCOUNTER — LAB (OUTPATIENT)
Dept: LAB | Facility: HOSPITAL | Age: 48
End: 2023-04-17
Payer: COMMERCIAL

## 2023-04-17 PROCEDURE — 80061 LIPID PANEL: CPT | Performed by: NURSE PRACTITIONER

## 2023-04-17 PROCEDURE — 84443 ASSAY THYROID STIM HORMONE: CPT | Performed by: NURSE PRACTITIONER

## 2023-04-17 PROCEDURE — 80053 COMPREHEN METABOLIC PANEL: CPT | Performed by: NURSE PRACTITIONER

## 2023-04-17 PROCEDURE — 83036 HEMOGLOBIN GLYCOSYLATED A1C: CPT | Performed by: NURSE PRACTITIONER

## 2023-04-17 PROCEDURE — 85025 COMPLETE CBC W/AUTO DIFF WBC: CPT | Performed by: NURSE PRACTITIONER

## 2023-04-24 RX ORDER — METOPROLOL TARTRATE 50 MG/1
TABLET, FILM COATED ORAL
Qty: 60 TABLET | Refills: 0 | Status: SHIPPED | OUTPATIENT
Start: 2023-04-24

## 2023-04-24 RX ORDER — PRAVASTATIN SODIUM 20 MG
TABLET ORAL
Qty: 30 TABLET | Refills: 0 | Status: SHIPPED | OUTPATIENT
Start: 2023-04-24

## 2023-04-24 RX ORDER — TAMOXIFEN CITRATE 20 MG/1
20 TABLET ORAL DAILY
Qty: 90 TABLET | Refills: 3 | Status: SHIPPED | OUTPATIENT
Start: 2023-04-24

## 2023-04-24 NOTE — TELEPHONE ENCOUNTER
Rx Refill Note  Requested Prescriptions     Pending Prescriptions Disp Refills   • metoprolol tartrate (LOPRESSOR) 50 MG tablet [Pharmacy Med Name: METOPROLOL TARTRATE 50 MG TAB] 60 tablet 0     Sig: TAKE ONE TABLET BY MOUTH TWICE A DAY   • pravastatin (PRAVACHOL) 20 MG tablet [Pharmacy Med Name: PRAVASTATIN SODIUM 20 MG TAB] 30 tablet 0     Sig: TAKE ONE TABLET BY MOUTH ONCE NIGHTLY      Last office visit with prescribing clinician: 7/1/2022   Last telemedicine visit with prescribing clinician: 4/22/2023   Next office visit with prescribing clinician: 4/22/2023       {TIP  Please add Last Relevant Lab Date if appropriate: 04/17/23                 Would you like a call back once the refill request has been completed: [] Yes [] No    If the office needs to give you a call back, can they leave a voicemail: [] Yes [] No    Rachel Paniagua MA  04/24/23, 15:29 EDT

## 2023-04-24 NOTE — TELEPHONE ENCOUNTER
Caller: Radha Torre    Relationship: Self    Best call back number: [phone]    Requested Prescriptions:   Requested Prescriptions     Pending Prescriptions Disp Refills   • tamoxifen (NOLVADEX) 20 MG chemo tablet       Sig: Take  by mouth Daily.        Pharmacy where request should be sent: [id number] [place], [place] - [address] - [phone] PH - [phone] FX     Last office visit with prescribing clinician: 2/23/2023   Last telemedicine visit with prescribing clinician: Visit date not found   Next office visit with prescribing clinician: 8/10/2023     Additional details provided by patient: PT IS COMPLETELY OUT OF THIS MEDICATION.    Does the patient have less than a 3 day supply:  [x] Yes  [] No    Would you like a call back once the refill request has been completed: [] Yes [x] No    If the office needs to give you a call back, can they leave a voicemail: [] Yes [x] No

## 2023-05-22 RX ORDER — PRAVASTATIN SODIUM 20 MG
TABLET ORAL
Qty: 90 TABLET | Refills: 1 | Status: SHIPPED | OUTPATIENT
Start: 2023-05-22

## 2023-05-22 RX ORDER — METOPROLOL TARTRATE 50 MG/1
TABLET, FILM COATED ORAL
Qty: 180 TABLET | Refills: 1 | Status: SHIPPED | OUTPATIENT
Start: 2023-05-22

## 2023-05-22 NOTE — TELEPHONE ENCOUNTER
Rx Refill Note  Requested Prescriptions     Pending Prescriptions Disp Refills   • pravastatin (PRAVACHOL) 20 MG tablet [Pharmacy Med Name: PRAVASTATIN SODIUM 20 MG TAB] 90 tablet 1     Sig: TAKE ONE TABLET BY MOUTH ONCE NIGHTLY   • metoprolol tartrate (LOPRESSOR) 50 MG tablet [Pharmacy Med Name: METOPROLOL TARTRATE 50 MG TAB] 180 tablet 1     Sig: TAKE ONE TABLET BY MOUTH TWICE A DAY      Last office visit with prescribing clinician: 7/1/2022   Last telemedicine visit with prescribing clinician:   Next office visit with prescribing clinician: 6/20/2023                         Would you like a call back once the refill request has been completed: [] Yes [] No    If the office needs to give you a call back, can they leave a voicemail: [] Yes [] No    Kenya Reilly LPN  05/22/23, 11:23 EDT

## 2023-07-31 NOTE — ANESTHESIA PROCEDURE NOTES
Airway  Urgency: elective    Date/Time: 7/20/2022 9:39 AM  Airway not difficult    General Information and Staff    Patient location during procedure: OR  Anesthesiologist: Kota Hoffmann MD  CRNA/CAA: Nani Edwards CRNA    Indications and Patient Condition  Indications for airway management: airway protection    Preoxygenated: yes  MILS not maintained throughout  Mask difficulty assessment: 1 - vent by mask    Final Airway Details  Final airway type: endotracheal airway      Successful airway: ETT  Cuffed: yes   Successful intubation technique: direct laryngoscopy  Facilitating devices/methods: intubating stylet  Endotracheal tube insertion site: oral  Blade: Jovita  Blade size: 3  ETT size (mm): 7.0  Cormack-Lehane Classification: grade I - full view of glottis  Placement verified by: chest auscultation   Cuff volume (mL): 8  Measured from: lips  Number of attempts at approach: 1  Assessment: lips, teeth, and gum same as pre-op and atraumatic intubation    Additional Comments  PreO2 100% face mask, IV induction, easy mask, DVL x1, cords noted, tube through, cuff up, EBBSH, +etCO2, = chest movement, tube secured in place, atraumatic, teeth and lips intact as preop.             
Have Your Skin Lesions Been Treated?: not been treated
Is This A New Presentation, Or A Follow-Up?: Skin Lesions
How Severe Is Your Skin Lesion?: moderate

## 2023-08-03 ENCOUNTER — OFFICE VISIT (OUTPATIENT)
Dept: ONCOLOGY | Facility: CLINIC | Age: 48
End: 2023-08-03
Payer: COMMERCIAL

## 2023-08-03 VITALS
SYSTOLIC BLOOD PRESSURE: 122 MMHG | TEMPERATURE: 97.1 F | DIASTOLIC BLOOD PRESSURE: 84 MMHG | RESPIRATION RATE: 16 BRPM | WEIGHT: 276.1 LBS | OXYGEN SATURATION: 98 % | BODY MASS INDEX: 43.34 KG/M2 | HEART RATE: 95 BPM | HEIGHT: 67 IN

## 2023-08-03 DIAGNOSIS — Z12.11 ENCOUNTER FOR SCREENING COLONOSCOPY: Primary | ICD-10-CM

## 2023-08-21 ENCOUNTER — APPOINTMENT (OUTPATIENT)
Dept: GENERAL RADIOLOGY | Facility: HOSPITAL | Age: 48
End: 2023-08-21
Payer: COMMERCIAL

## 2023-08-21 ENCOUNTER — HOSPITAL ENCOUNTER (EMERGENCY)
Facility: HOSPITAL | Age: 48
Discharge: HOME | End: 2023-08-21
Attending: EMERGENCY MEDICINE
Payer: COMMERCIAL

## 2023-08-21 VITALS
HEART RATE: 109 BPM | SYSTOLIC BLOOD PRESSURE: 121 MMHG | TEMPERATURE: 98 F | DIASTOLIC BLOOD PRESSURE: 102 MMHG | OXYGEN SATURATION: 96 % | RESPIRATION RATE: 20 BRPM

## 2023-08-21 DIAGNOSIS — R05.1 ACUTE COUGH: Primary | ICD-10-CM

## 2023-08-21 DIAGNOSIS — J05.0 CROUP: ICD-10-CM

## 2023-08-21 LAB
ALBUMIN SERPL-MCNC: 4.2 G/DL (ref 3.5–5.2)
ALBUMIN/GLOB SERPL: 1.4 G/DL
ALP SERPL-CCNC: 93 U/L (ref 39–117)
ALT SERPL W P-5'-P-CCNC: 28 U/L (ref 1–33)
ANION GAP SERPL CALCULATED.3IONS-SCNC: 9.9 MMOL/L (ref 5–15)
AST SERPL-CCNC: 27 U/L (ref 1–32)
BASOPHILS # BLD AUTO: 0.04 10*3/MM3 (ref 0–0.2)
BASOPHILS NFR BLD AUTO: 0.4 % (ref 0–1.5)
BILIRUB SERPL-MCNC: 0.2 MG/DL (ref 0–1.2)
BUN SERPL-MCNC: 11 MG/DL (ref 6–20)
BUN/CREAT SERPL: 16.9 (ref 7–25)
CALCIUM SPEC-SCNC: 8.4 MG/DL (ref 8.6–10.5)
CHLORIDE SERPL-SCNC: 101 MMOL/L (ref 98–107)
CO2 SERPL-SCNC: 25.1 MMOL/L (ref 22–29)
CREAT SERPL-MCNC: 0.65 MG/DL (ref 0.57–1)
DEPRECATED RDW RBC AUTO: 43.2 FL (ref 37–54)
EGFRCR SERPLBLD CKD-EPI 2021: 109.4 ML/MIN/1.73
EOSINOPHIL # BLD AUTO: 0.32 10*3/MM3 (ref 0–0.4)
EOSINOPHIL NFR BLD AUTO: 3.3 % (ref 0.3–6.2)
ERYTHROCYTE [DISTWIDTH] IN BLOOD BY AUTOMATED COUNT: 13 % (ref 12.3–15.4)
FLUAV RNA RESP QL NAA+PROBE: NOT DETECTED
FLUBV RNA RESP QL NAA+PROBE: NOT DETECTED
GLOBULIN UR ELPH-MCNC: 2.9 GM/DL
GLUCOSE SERPL-MCNC: 109 MG/DL (ref 65–99)
HCT VFR BLD AUTO: 39.9 % (ref 34–46.6)
HGB BLD-MCNC: 12.7 G/DL (ref 12–15.9)
IMM GRANULOCYTES # BLD AUTO: 0.06 10*3/MM3 (ref 0–0.05)
IMM GRANULOCYTES NFR BLD AUTO: 0.6 % (ref 0–0.5)
LYMPHOCYTES # BLD AUTO: 1.96 10*3/MM3 (ref 0.7–3.1)
LYMPHOCYTES NFR BLD AUTO: 20.5 % (ref 19.6–45.3)
MCH RBC QN AUTO: 29 PG (ref 26.6–33)
MCHC RBC AUTO-ENTMCNC: 31.8 G/DL (ref 31.5–35.7)
MCV RBC AUTO: 91.1 FL (ref 79–97)
MONOCYTES # BLD AUTO: 0.69 10*3/MM3 (ref 0.1–0.9)
MONOCYTES NFR BLD AUTO: 7.2 % (ref 5–12)
NEUTROPHILS NFR BLD AUTO: 6.5 10*3/MM3 (ref 1.7–7)
NEUTROPHILS NFR BLD AUTO: 68 % (ref 42.7–76)
NRBC BLD AUTO-RTO: 0 /100 WBC (ref 0–0.2)
NT-PROBNP SERPL-MCNC: <36 PG/ML (ref 0–450)
PLATELET # BLD AUTO: 327 10*3/MM3 (ref 140–450)
PMV BLD AUTO: 11.2 FL (ref 6–12)
POTASSIUM SERPL-SCNC: 3.7 MMOL/L (ref 3.5–5.2)
PROT SERPL-MCNC: 7.1 G/DL (ref 6–8.5)
RBC # BLD AUTO: 4.38 10*6/MM3 (ref 3.77–5.28)
S PYO AG THROAT QL: NEGATIVE
SARS-COV-2 RNA RESP QL NAA+PROBE: NOT DETECTED
SODIUM SERPL-SCNC: 136 MMOL/L (ref 136–145)
TROPONIN T SERPL HS-MCNC: <6 NG/L
WBC NRBC COR # BLD: 9.57 10*3/MM3 (ref 3.4–10.8)

## 2023-08-21 PROCEDURE — 84484 ASSAY OF TROPONIN QUANT: CPT | Performed by: EMERGENCY MEDICINE

## 2023-08-21 PROCEDURE — 87880 STREP A ASSAY W/OPTIC: CPT | Performed by: EMERGENCY MEDICINE

## 2023-08-21 PROCEDURE — 80053 COMPREHEN METABOLIC PANEL: CPT | Performed by: EMERGENCY MEDICINE

## 2023-08-21 PROCEDURE — 99283 EMERGENCY DEPT VISIT LOW MDM: CPT

## 2023-08-21 PROCEDURE — 36415 COLL VENOUS BLD VENIPUNCTURE: CPT

## 2023-08-21 PROCEDURE — 87081 CULTURE SCREEN ONLY: CPT | Performed by: EMERGENCY MEDICINE

## 2023-08-21 PROCEDURE — 83880 ASSAY OF NATRIURETIC PEPTIDE: CPT | Performed by: EMERGENCY MEDICINE

## 2023-08-21 PROCEDURE — 94640 AIRWAY INHALATION TREATMENT: CPT

## 2023-08-21 PROCEDURE — 94799 UNLISTED PULMONARY SVC/PX: CPT

## 2023-08-21 PROCEDURE — 71045 X-RAY EXAM CHEST 1 VIEW: CPT

## 2023-08-21 PROCEDURE — 87636 SARSCOV2 & INF A&B AMP PRB: CPT | Performed by: EMERGENCY MEDICINE

## 2023-08-21 PROCEDURE — 25010000002 METHYLPREDNISOLONE PER 125 MG: Performed by: EMERGENCY MEDICINE

## 2023-08-21 PROCEDURE — 96374 THER/PROPH/DIAG INJ IV PUSH: CPT

## 2023-08-21 PROCEDURE — 85025 COMPLETE CBC W/AUTO DIFF WBC: CPT | Performed by: EMERGENCY MEDICINE

## 2023-08-21 RX ORDER — SODIUM CHLORIDE 0.9 % (FLUSH) 0.9 %
10 SYRINGE (ML) INJECTION AS NEEDED
Status: DISCONTINUED | OUTPATIENT
Start: 2023-08-21 | End: 2023-08-21 | Stop reason: HOSPADM

## 2023-08-21 RX ORDER — IPRATROPIUM BROMIDE AND ALBUTEROL SULFATE 2.5; .5 MG/3ML; MG/3ML
3 SOLUTION RESPIRATORY (INHALATION) ONCE
Status: COMPLETED | OUTPATIENT
Start: 2023-08-21 | End: 2023-08-21

## 2023-08-21 RX ORDER — METHYLPREDNISOLONE SODIUM SUCCINATE 125 MG/2ML
125 INJECTION, POWDER, LYOPHILIZED, FOR SOLUTION INTRAMUSCULAR; INTRAVENOUS ONCE
Status: COMPLETED | OUTPATIENT
Start: 2023-08-21 | End: 2023-08-21

## 2023-08-21 RX ORDER — PREDNISONE 20 MG/1
TABLET ORAL
Qty: 9 TABLET | Refills: 0 | Status: SHIPPED | OUTPATIENT
Start: 2023-08-21

## 2023-08-21 RX ADMIN — METHYLPREDNISOLONE SODIUM SUCCINATE 125 MG: 125 INJECTION, POWDER, FOR SOLUTION INTRAMUSCULAR; INTRAVENOUS at 05:32

## 2023-08-21 RX ADMIN — IPRATROPIUM BROMIDE AND ALBUTEROL SULFATE 3 ML: .5; 3 SOLUTION RESPIRATORY (INHALATION) at 05:36

## 2023-08-21 NOTE — Clinical Note
BRIAN ROJAS  [facility] EMERGENCY DEPARTMENT  [address] [place] [address]  Phone: [phone]    Radha Torre was seen and treated in our emergency department on 8/21/2023.  She may return to work on 08/23/2023.         Thank you for choosing [facility].    Rico Roa MD

## 2023-08-21 NOTE — Clinical Note
BRIAN ROJAS  [facility] EMERGENCY DEPARTMENT  [address] [place] [address]  Phone: [phone]    Radha Torre was seen and treated in our emergency department on 8/21/2023.  She may return to work on 08/23/2023.         Thank you for choosing [facility].    Rico Roa MD

## 2023-08-21 NOTE — ED PROVIDER NOTES
Subjective   History of Present Illness  Patient presents complaining of a 24-hour history of sore throat, cough, and some wheezing.  Patient tried giving herself a neb treatment at home but her nebulizer broke.  Patient denies any recent travel, sick contacts, bad food exposure, or trauma.  Patient denies any productivity with the cough.  No therapy taken prior to arrival.  Nothing seems to make it better or worse.      Review of Systems   All other systems reviewed and are negative.    Past Medical History:   Diagnosis Date    Abnormal ECG     Acid reflux     ON OCC    Anesthesia complication     BP DROPPED DUE TO EPIDURAL FROM C-SECTIONS    Anxiety     Asthma 2017    Occasional reactive asthma/bronchitis after respiratory illness    At risk for sleep apnea     STOP BANG 5    Breast cancer 05/13/2022    Right breast ductal carcinoma in situ, ER/SD positive, Ki-67 5%    Depression 2004    Postpartum with 3 births    Deviated septum     Fatigue     Fissure, anal     History of UTI     Hyperlipidemia     Hypertension 2017    noted at last few urgent care visits    Insomnia     Migraine     Obesity     Pain in elbow joint     bilateral    PVC (premature ventricular contraction) 9/8/2022    Rectal bleeding     RELATED TO RECTAL FISSUE    Seasonal allergic rhinitis        Allergies   Allergen Reactions    Bupropion Palpitations     POSSIBLE    Adhesive Tape Rash     SWELLS. Localized if left on for extended periods of time       Past Surgical History:   Procedure Laterality Date    BREAST BIOPSY Right 05/16/2022    Tomosynthesis guided Mammotome vacuum assisted right breast 4 o'clock position-Dr. Desmond Zamarripa, [facility]    BREAST LUMPECTOMY Right 07/20/2022    Procedure: RIGHT BREAST WIRE LOCALIZED LUMPECTOMY FOR DCIS;  Surgeon: Daisy Romero MD;  Location: [facility];  Service: General;  Laterality: Right;    BREAST SURGERY Bilateral 07/20/2022    Procedure: RIGHT BREAST ONCOPLASTIC CLOSURE LEFT BREST REDUCTION FOR  SYMMETRY;  Surgeon: Rebecca Bhakta MD;  Location: [facility] MAIN OR;  Service: Plastics;  Laterality: Bilateral;     SECTION N/A     x 3    D & C HYSTEROSCOPY ENDOMETRIAL ABLATION N/A 2018    Procedure: DILATATION AND CURETTAGE HYSTEROSCOPY NOVASURE ENDOMETRIAL ABLATION;  Surgeon: Puneet Carreno MD;  [place] [facility] [place] [facility]  Service: Obstetrics/Gynecology    MOHS SURGERY Left     Left Elbow & Left Scalp    REDUCTION MAMMAPLASTY      SKIN LESION EXCISION Right     Right Breast, Benign    TUBAL ABDOMINAL LIGATION Bilateral 2013    after last     WISDOM TOOTH EXTRACTION Bilateral        Family History   Problem Relation Age of Onset    Anxiety disorder Mother     Depression Mother     Stroke Mother     Skin cancer Mother         BCE SCE    Colon polyps Mother     Breast cancer Mother 74        DCIS with invasive. DCIS Left Breast 2:30 position ER/GA +, Her2-. Left Breast UOQ Invasive DCIS is multifocal, Two benign sentinel lymph nodes, Pathologic stage: pT1a, N(sn)0.    Arthritis Mother     Cancer Mother         DCIS and invasive    Alcohol abuse Brother     Autism Son     Learning disabilities Son         Autism    Depression Son     Depression Maternal Grandmother     Anxiety disorder Maternal Grandmother     Hyperlipidemia Maternal Grandmother     Hypertension Maternal Grandmother     Heart disease Maternal Grandmother     Arthritis Maternal Grandmother     Stroke Maternal Grandmother     Coronary artery disease Maternal Grandfather     Esophageal cancer Paternal Grandfather 85    Colon cancer Neg Hx     Malig Hyperthermia Neg Hx        Social History     Socioeconomic History    Marital status:    Tobacco Use    Smoking status: Former     Packs/day: 1.00     Years: 5.00     Pack years: 5.00     Types: Cigarettes     Quit date: 2001     Years since quittin.2    Smokeless tobacco: Never   Vaping Use    Vaping Use: Never used   Substance and Sexual Activity    Alcohol  use: Yes     Alcohol/week: 2.0 standard drinks     Types: 2 Drinks containing 0.5 oz of alcohol per week     Comment: Socially; few times a month    Drug use: Never    Sexual activity: Yes     Partners: Male     Birth control/protection: Surgical           Objective   Physical Exam  Vitals and nursing note reviewed.   Constitutional:       General: She is not in acute distress.     Appearance: She is well-developed.   HENT:      Head: Normocephalic.      Mouth/Throat:      Mouth: Mucous membranes are moist.      Pharynx: Oropharynx is clear. No pharyngeal swelling or oropharyngeal exudate.   Eyes:      Conjunctiva/sclera: Conjunctivae normal.   Cardiovascular:      Rate and Rhythm: Normal rate and regular rhythm.   Pulmonary:      Effort: Pulmonary effort is normal.      Breath sounds: Stridor present. No rhonchi or rales.   Chest:      Chest wall: No tenderness or crepitus.   Abdominal:      Palpations: Abdomen is soft.   Musculoskeletal:      Cervical back: Neck supple.      Right lower leg: No edema.      Left lower leg: No edema.   Lymphadenopathy:      Cervical: No cervical adenopathy.   Skin:     General: Skin is warm and dry.      Capillary Refill: Capillary refill takes 2 to 3 seconds.   Neurological:      Mental Status: She is alert and oriented to person, place, and time.   Psychiatric:         Mood and Affect: Mood normal.         Behavior: Behavior normal.       Procedures           ED Course                                           Medical Decision Making  Ddx sinusitis, laryngitis, pharyngitis, bronchitis, croup, pneumonia    XR Chest 1 View    Result Date: 8/21/2023  No acute cardiopulmonary findings.  This report was finalized on 8/21/2023 6:32 AM by Dr. Fransico Caballero MD.      Labs Reviewed  COMPREHENSIVE METABOLIC PANEL - Abnormal; Notable for the following components:     Glucose                       109 (*)                Calcium                       8.4 (*)             All other components  within normal limits         Narrative: GFR Normal >60                  Chronic Kidney Disease <60                  Kidney Failure <15                    CBC WITH AUTO DIFFERENTIAL - Abnormal; Notable for the following components:     Immature Grans %              0.6 (*)                Immature Grans, Absolute      0.06 (*)            All other components within normal limits  RAPID STREP A SCREEN - Normal  COVID-19 AND FLU A/B, NP SWAB IN TRANSPORT MEDIA 8-12 HR TAT - Normal         Narrative: Fact sheet for providers: https://www.fda.gov/media/108485/download                                    Fact sheet for patients: https://www.fda.gov/media/856017/download                                    Test performed by PCR.  SINGLE HSTROPONIN T - Normal         Narrative: High Sensitive Troponin T Reference Range:                  <10.0 ng/L- Negative Female for AMI                  <15.0 ng/L- Negative Male for AMI                  >=10 - Abnormal Female indicating possible myocardial injury.                  >=15 - Abnormal Male indicating possible myocardial injury.                   Clinicians would have to utilize clinical acumen, EKG, Troponin, and serial changes to determine if it is an Acute Myocardial Infarction or myocardial injury due to an underlying chronic condition.                                       BNP (IN-HOUSE) - Normal         Narrative: Among patients with dyspnea, NT-proBNP is highly sensitive for the detection of acute congestive heart failure. In addition NT-proBNP of <300 pg/ml effectively rules out acute congestive heart failure with 99% negative predictive value.                    COVID PRE-OP / PRE-PROCEDURE SCREENING ORDER (NO ISOLATION)         Narrative: The following orders were created for panel order COVID PRE-OP / PRE-PROCEDURE SCREENING ORDER (NO ISOLATION) - Swab, Nasopharynx.                  Procedure                               Abnormality         Status                                      ---------                               -----------         ------                                     COVID-19 and FLU A/B PCR...[[id number]]  Normal              Final result                                                 Please view results for these tests on the individual orders.  BETA HEMOLYTIC STREP CULTURE, THROAT  CBC AND DIFFERENTIAL    0652 Pt seen again prior to d/c.  Labs/Imaging reviewed and are unremarkable.  Symptoms improved and pt feels better;  Non-toxic. Comfortable. Ambulating without difficulty.  Tolerating po.  Relaxed breathing.  All questions personally answered at the bedside and all d/c instructions personally reviewed with pt.  Discussed the importance of close outpt. f/u and pt. understands this and agrees to do so.  Pt agrees to return to ED immediately for any new, persistent, or worsening symptoms.    EMR Dragon/Transcription disclaimer:  Much of this encounter note is an electronic transcription/translation of spoken language to printed text, aka voice recognition.  The electronic translation of spoken language may permit erroneous or at times nonsensical words or phrases to be inadvertently transcribed; although I have reviewed the note for such errors, some may still exist so please interpret based on surrounding text content.      Amount and/or Complexity of Data Reviewed  Labs: ordered.  Radiology: ordered.    Risk  Prescription drug management.        Final diagnoses:   Acute cough   Croup       ED Disposition  ED Disposition       ED Disposition   Discharge    Condition   Stable    Comment   --               Sofia Mora, APRN  [address]  [place] [address]  [phone]    In 3 days  If symptoms worsen         Medication List        New Prescriptions      predniSONE 20 MG tablet  Commonly known as: DELTASONE  Take 3 tabs p.o. daily on day 1, then 2 tabs p.o. on days 2-3, then 1 tab p.o. on days 4-5.            Changed       ipratropium-albuterol 0.5-2.5 mg/3 ml nebulizer  Commonly known as: DUO-NEB  Take 3 mL by nebulization Every 4 (Four) Hours As Needed for Wheezing or Shortness of Air.  What changed: additional instructions               Where to Get Your Medications        These medications were sent to [facility] [id number] - [place] - [address] - [phone]  - [phone]   [address] [place] [address]      Phone: [phone]   predniSONE 20 MG tablet            Rico Roa MD  08/21/23 6821

## 2023-08-22 LAB — BACTERIA SPEC AEROBE CULT: NORMAL

## 2023-09-13 ENCOUNTER — PATIENT MESSAGE (OUTPATIENT)
Dept: ONCOLOGY | Facility: CLINIC | Age: 48
End: 2023-09-13
Payer: COMMERCIAL

## 2023-09-13 DIAGNOSIS — D05.11 DUCTAL CARCINOMA IN SITU (DCIS) OF RIGHT BREAST: Primary | ICD-10-CM

## 2023-09-22 ENCOUNTER — OFFICE VISIT (OUTPATIENT)
Dept: PSYCHIATRY | Facility: HOSPITAL | Age: 48
End: 2023-09-22
Payer: COMMERCIAL

## 2023-09-22 ENCOUNTER — NUTRITION (OUTPATIENT)
Dept: OTHER | Facility: HOSPITAL | Age: 48
End: 2023-09-22
Payer: COMMERCIAL

## 2023-09-22 DIAGNOSIS — F31.81 BIPOLAR II DISORDER: Primary | ICD-10-CM

## 2023-09-22 DIAGNOSIS — D05.11 DUCTAL CARCINOMA IN SITU (DCIS) OF RIGHT BREAST: ICD-10-CM

## 2023-09-22 NOTE — PROGRESS NOTES
OUTPATIENT ONCOLOGY NUTRITION ASSESSMENT    Patient Name: Radha Torre  YOB: 1975  MRN: [id number]  Assessment Date: 9/22/2023    COMMENTS: [facility]   The patient is s/p lumpectomy, bilateral breast reduction, and received radiation, currently taking tamoxifen. Other meds reviewed (effexor, vyvanse). The patient reports that she initially experienced weight loss and decreased appetite when she started taking the vyvanse but this was just short term. She is meeting with the supportive oncology APRN today and will discuss meds as well as problems with sleep and anxiety, feeling distracted. She reports that for a while she was making good food choices, doing some meal planning and meal prep and was trying to increase her activity but she has lost motivation. She is here today to discuss nutrition recommendations for breast cancer survivors and weight management.     Reviewed the importance of maintaining or achieving a healthy body weight and including activity daily. Discussed ways to increase dietary fiber from whole grains, legumes, seeds, nuts, vegetables and fruit as part of a plant focused diet. Also reviewed current recommendation and evidence supporting the use of whole foods sources of soy foods including tofu, tempeh, edamame and soy beverages.  Encouraged at least 150 minutes per week of moderate intensity physical activity such as a brisk walk, cycling and swimming.   Provided information on meal planning, grocery shopping, and label reading. Gave examples of meals and snacks, a sample menu and a list of recommended recipe sites. Also discussed the importance of portion control and mindfulness.   Emphasized with patient that she does not have to make all of these changes all at one time. Encouraged her to choose one area to work on and go from there.     Will be available for any questions.  Encouraged patient to schedule another follow up  appointment if she feels she needs it.          Reason for Assessment Education      Diagnosis/Problem   Breast cancer   Treatment Plan Radiation completed, surgery- lumpectomy     MST = 0 or 1 Patient not at risk for malnutrition    Encounter Information        Nutrition/Diet History:     Oral Nutrition Supplements:    Factors/Symptoms Affecting Intake: No factors at this time   Comments:      Medical/Surgical History Past Medical History:   Diagnosis Date    Abnormal ECG     Acid reflux     ON OCC    Anesthesia complication     BP DROPPED DUE TO EPIDURAL FROM C-SECTIONS    Anxiety     Asthma 2017    Occasional reactive asthma/bronchitis after respiratory illness    At risk for sleep apnea     STOP BANG 5    Breast cancer 2022    Right breast ductal carcinoma in situ, ER/[place] positive, Ki-67 5%    Depression     Postpartum with 3 births    Deviated septum     Fatigue     Fissure, anal     History of UTI     Hyperlipidemia     Hypertension 2017    noted at last few urgent care visits    Insomnia     Migraine     Obesity     Pain in elbow joint     bilateral    PVC (premature ventricular contraction) 2022    Rectal bleeding     RELATED TO RECTAL FISSUE    Seasonal allergic rhinitis        Past Surgical History:   Procedure Laterality Date    BREAST BIOPSY Right 2022    Tomosynthesis guided Mammotome vacuum assisted right breast 4 o'clock position-Dr. Desmond Zamarripa, [facility]    BREAST LUMPECTOMY Right 2022    Procedure: RIGHT BREAST WIRE LOCALIZED LUMPECTOMY FOR DCIS;  Surgeon: Daisy Romero MD;  Location: [facility];  Service: General;  Laterality: Right;    BREAST SURGERY Bilateral 2022    Procedure: RIGHT BREAST ONCOPLASTIC CLOSURE LEFT BREST REDUCTION FOR SYMMETRY;  Surgeon: Rebecca Bhakta MD;  Location: [facility];  Service: Plastics;  Laterality: Bilateral;     SECTION N/A     x 3    D & C HYSTEROSCOPY ENDOMETRIAL ABLATION N/A 2018    Procedure: DILATATION  "AND CURETTAGE HYSTEROSCOPY NOVASURE ENDOMETRIAL ABLATION;  Surgeon: Puneet Carreno MD;  Location: [facility];  Service: Obstetrics/Gynecology    MOHS SURGERY Left     Left Elbow & Left Scalp    REDUCTION MAMMAPLASTY      SKIN LESION EXCISION Right     Right Breast, Benign    TUBAL ABDOMINAL LIGATION Bilateral 2013    after last     WISDOM TOOTH EXTRACTION Bilateral             Anthropometrics        Current Height Ht Readings from Last 1 Encounters:   23 170 cm (66.93\")      Current Weight Wt Readings from Last 1 Encounters:   23 125 kg (276 lb 1.6 oz)      BMI  43   Ideal Body Weight (IBW) 135 lb   Usual Body Weight (UBW) 290 lb   Weight Change/Trend Loss   Weight History Wt Readings from Last 30 Encounters:   23 1147 125 kg (276 lb 1.6 oz)   23 1424 127 kg (279 lb 14.4 oz)   23 1044 127 kg (279 lb)   22 1325 129 kg (285 lb)   10/27/22 1146 135 kg (298 lb)   22 1155 134 kg (296 lb 6.4 oz)   22 1158 136 kg (299 lb 9.6 oz)   22 0911 134 kg (295 lb)   22 1340 134 kg (295 lb 12.8 oz)   22 0959 134 kg (296 lb)   22 1001 136 kg (299 lb 12.8 oz)   22 1043 (!) 136 kg (300 lb 9.6 oz)   22 0959 (!) 136 kg (300 lb 9.6 oz)   22 0717 134 kg (295 lb 6.7 oz)   22 0843 133 kg (292 lb 3.2 oz)   07/10/22 1716 129 kg (285 lb)   22 0940 134 kg (294 lb 8 oz)   22 1050 134 kg (295 lb 6.4 oz)   22 1539 133 kg (293 lb)   22 0755 118 kg (260 lb)   01/10/22 1326 134 kg (295 lb)   21 1043 131 kg (289 lb 1.6 oz)   10/21/19 1024 114 kg (251 lb)   19 1115 117 kg (259 lb)   19 1423 119 kg (262 lb 8 oz)   18 0908 117 kg (257 lb)   18 1131 118 kg (261 lb 3.9 oz)   18 0914 117 kg (258 lb)   18 1042 117 kg (257 lb)   18 1019 116 kg (255 lb)          Medications           Current medications: Aspirin, Fluticasone Furoate-Vilanterol, LORazepam, cetirizine, gabapentin, " ipratropium-albuterol, levoFLOXacin, lisdexamfetamine, losartan, metoprolol tartrate, multivitamin with minerals, pravastatin, predniSONE, tamoxifen, and venlafaxine XR                 Tests/Procedures        Tests/Procedures No new tests/procedures     Labs       Pertinent Labs          Invalid input(s): LABSONAM YU          COVID19   Date Value Ref Range Status   08/21/2023 Not Detected Not Detected - Ref. Range Final     Lab Results   Component Value Date    HGBA1C 5.50 04/17/2023          Physical Findings        Physical Appearance alert     Edema  no edema   Gastrointestinal None   Tubes/Drains none   Oral/Mouth Cavity WNL   Skin        Estimated/Assessed Needs        Energy Requirements    Height for Calculation      Weight for Calculation 61 kg   Method for Estimation  25 kcal/kg   EST Needs (kcal/day) 1525 kcals/d       Protein Requirements    Weight for Calculation 125 kg   EST Protein Needs (g/kg) 0.8 gm/kg   EST Daily Needs (g/day) 100 g/d       Fluid Requirements     Method for Estimation 1 mL/kcal    Estimated Needs (mL/day) 1525 ml/d           PES STATEMENT / NUTRITION DIAGNOSIS      Nutrition Dx Problem Problem:    NutritionDiagnosisProblem: Knowledge Deficit    Etiology:  Medical diagnosis: Breast cancer  Factors affecting nutrition: Reported/Observed By      Signs/Symptoms:  Information Deficit    Comment:      NUTRITION INTERVENTION / PLAN OF CARE      Intervention Goal(s) Provide information, Disease management/therapy, and Appropriate weight loss         RD Intervention/Action Follow up as needed         Recommendations:       PO Diet       Supplements       Snacks       Other    --      Monitor/Evaluation Weight, Follow up as needed   Education Education provided   --    RD to follow     Electronically signed by:  Yoselin Tse RD, SE[facility]US  09/22/23 13:24 EDT

## 2023-09-22 NOTE — PROGRESS NOTES
In Person  Provider Location: [facility]    Chief Complaint: Anxiety, depression    Subjective  Patient ID: Radha Torre is a 48 y.o. female who presents for initial consultation through the [facility] at the request of No ref. provider found     PHQ9  21  IZABELA 7 Total Score: 14    HPI:  Pt is a 48 year old female who reports to behavioral health clinic alongside survivorship of breast cancer, s/p lumpectomy and radiation (complicated by covid infection), remaining on tamoxifen. Pt shares tremendous impact of grief prior to and alongside this diagnosis, beginning in 2020 and including mom's diagnosis with cancer, triangularity in marriage, mental health concerns in child, and and adjusted family dynamics. Reports significant mood disruption in current setting, interest in reevaluating current medication strategy.    Behavioral health history reviewed. Pt considers childhood ACEs, initial episode of post partum depression following first child although did not seek help or treat. Discussed anxiety and catastrophic thinking in second pregnancy with OBGYN, assisted by buspirone. Obsessive thoughts persist at times. Questions some focus on numbers, although denies this being disruptive. Denied any connection to mental health care between these events and 2021, at which time pt reports connecting to therapist alongside extramarital conflict. Saw PCP with initiation of wellbutrin, transitioned to venlafaxine XR due to contraindication with tamoxifen.    Later went to the couch with connection to ALEX[facility] Red, with diagnosis of ADHD and addition of Vyvanse. Reports benefit of weight loss, escalating to 70 mg daily to approach binge eating disorder dose. Currently on 70 mg daily and feels this may be increasing anxiety. Reports improved focus, brain fog. Pt works at  at [employer]. Reports completing 2 years of college. School  "complicated by social drinking, not going to class, etc. Mood was better until a couple months ago, really bad a few weeks ago. Reports crying all the time. Really difficult to get out of bed, take shower, etc. Limited ability to care for children to best of her ability. Denies plan or intent to harm self, although does share thoughts of 'being better off dead.' Supports children as being protective. Pt denies past self harm, in patient admission, or suicide attempt. Denies completed suicide in family.     Sleep is reportedly \"not good.\" Pt reports taking ativan 2 mg q hs. Goes to bed appx 11 PM, up at 6 AM. Denies sleeping during the day on most days, although has had periods of more sleep during the day with worsening depression. Impulsivity reviewed including overspending. Does report preiods of 5 days at a time with low sleep needs, higher activity. Hot flashes are intrusive, more during the day than at night. Does feel gabapentin 600 mg q hs is helpful for this.     Rakesh reviewed; pt states she did not know prescription for ativan had been at 2 mg bid for several months; thought was just increased. Denies taking it this way, but confirms need for refill. Pt reports seeing current provider every 3 months via telehealth. Is interested in transitioning care.    Family history includes notable depression in mother and maternal grandmother. No information regarding paternal health history. Pt does question diagnosis of bipolar disorder in self and family members.    Social History  Marital Status:  to , complicated  Children: Three children  Support Community: Friends, mom  Highest Level of Education: college graduate  Career: Pt works at  at [employer]  Tobacco Use: The patient denies current or previous tobacco use.  Alcohol Use: Social; less than once weekly  Marijuana/ Other drug Use: denied.    Medical History  Psychiatric History: Hx post partum depression    Family " History  Family Psychiatric History: Doesn't know father or paternal side of family; mother and maternal grandmother have anxiety and depression, potential bipolar disorder  Family Cancer History: Mother has cancer    The following portions of the patient's history were reviewed and updated as appropriate: She  has a past medical history of Abnormal ECG, Acid reflux, Anesthesia complication, Anxiety, Asthma (), At risk for sleep apnea, Breast cancer (2022), Depression (), Deviated septum, Fatigue, Fissure, anal, History of UTI, Hyperlipidemia, Hypertension (), Insomnia, Migraine, Obesity, Pain in elbow joint, PVC (premature ventricular contraction) (2022), Rectal bleeding, and Seasonal allergic rhinitis.    She  has a past surgical history that includes  section (N/A); Tubal ligation (Bilateral, 2013); [place] tooth extraction (Bilateral); d & c hysteroscopy endometrial ablation (N/A, 2018); Mohs surgery (Left); Skin lesion excision (Right); Breast biopsy (Right, 2022); Breast lumpectomy (Right, 2022); Breast surgery (Bilateral, 2022); and Reduction mammaplasty.  Her family history includes Alcohol abuse in her brother; Anxiety disorder in her maternal grandmother and mother; Arthritis in her maternal grandmother and mother; Autism in her son; Breast cancer (age of onset: 74) in her mother; Cancer in her mother; Colon polyps in her mother; Coronary artery disease in her maternal grandfather; Depression in her maternal grandmother, mother, and son; Esophageal cancer (age of onset: 85) in her paternal grandfather; Heart disease in her maternal grandmother; Hyperlipidemia in her maternal grandmother; Hypertension in her maternal grandmother; Learning disabilities in her son; Skin cancer in her mother; Stroke in her maternal grandmother and mother.  She  reports that she quit smoking about 22 years ago. Her smoking use included cigarettes. She has a 5.00  pack-year smoking history. She has never used smokeless tobacco. She reports current alcohol use of about 2.0 standard drinks per week. She reports that she does not use drugs.  Current Outpatient Medications   Medication Sig Dispense Refill    ASPIRIN 81 PO Take 81 mg by mouth Daily.      cetirizine (zyrTEC) 10 MG tablet Take 1 tablet by mouth Daily. Rotates around with other allergy medicaitons      gabapentin (NEURONTIN) 300 MG capsule       ipratropium-albuterol (DUO-NEB) 0.5-2.5 mg/3 ml nebulizer Take 3 mL by nebulization Every 4 (Four) Hours As Needed for Wheezing or Shortness of Air. (Patient taking differently: Take 3 mL by nebulization Every 4 (Four) Hours As Needed for Wheezing or Shortness of Air. prn) 360 mL 2    levoFLOXacin (Levaquin) 500 MG tablet Take 1 tablet by mouth Daily. 10 tablet 0    LORazepam (ATIVAN) 2 MG tablet       losartan (COZAAR) 50 MG tablet Take 1 tablet by mouth Daily.      metoprolol tartrate (LOPRESSOR) 50 MG tablet TAKE ONE TABLET BY MOUTH TWICE A  tablet 1    multivitamin with minerals tablet tablet Take 1 tablet by mouth Daily. (Patient not taking: Reported on 8/3/2023)      pravastatin (PRAVACHOL) 20 MG tablet TAKE ONE TABLET BY MOUTH ONCE NIGHTLY 90 tablet 1    predniSONE (DELTASONE) 20 MG tablet Take 3 tabs p.o. daily on day 1, then 2 tabs p.o. on days 2-3, then 1 tab p.o. on days 4-5. 9 tablet 0    tamoxifen (NOLVADEX) 20 MG chemo tablet Take 1 tablet by mouth Daily. 90 tablet 3    venlafaxine XR (EFFEXOR-XR) 150 MG 24 hr capsule       venlafaxine XR (EFFEXOR-XR) 75 MG 24 hr capsule       Vyvanse 40 MG capsule        No current facility-administered medications for this visit.     Current Outpatient Medications on File Prior to Visit   Medication Sig    ASPIRIN 81 PO Take 81 mg by mouth Daily.    cetirizine (zyrTEC) 10 MG tablet Take 1 tablet by mouth Daily. Rotates around with other allergy medicaitons    gabapentin (NEURONTIN) 300 MG capsule      ipratropium-albuterol (DUO-NEB) 0.5-2.5 mg/3 ml nebulizer Take 3 mL by nebulization Every 4 (Four) Hours As Needed for Wheezing or Shortness of Air. (Patient taking differently: Take 3 mL by nebulization Every 4 (Four) Hours As Needed for Wheezing or Shortness of Air. prn)    levoFLOXacin (Levaquin) 500 MG tablet Take 1 tablet by mouth Daily.    LORazepam (ATIVAN) 2 MG tablet     losartan (COZAAR) 50 MG tablet Take 1 tablet by mouth Daily.    metoprolol tartrate (LOPRESSOR) 50 MG tablet TAKE ONE TABLET BY MOUTH TWICE A DAY    multivitamin with minerals tablet tablet Take 1 tablet by mouth Daily. (Patient not taking: Reported on 8/3/2023)    pravastatin (PRAVACHOL) 20 MG tablet TAKE ONE TABLET BY MOUTH ONCE NIGHTLY    predniSONE (DELTASONE) 20 MG tablet Take 3 tabs p.o. daily on day 1, then 2 tabs p.o. on days 2-3, then 1 tab p.o. on days 4-5.    tamoxifen (NOLVADEX) 20 MG chemo tablet Take 1 tablet by mouth Daily.    venlafaxine XR (EFFEXOR-XR) 150 MG 24 hr capsule     venlafaxine XR (EFFEXOR-XR) 75 MG 24 hr capsule     Vyvanse 40 MG capsule     [DISCONTINUED] Fluticasone Furoate-Vilanterol (Breo Ellipta) 200-25 MCG/INH inhaler Inhale 1 puff Daily.     No current facility-administered medications on file prior to visit.     She is allergic to bupropion and adhesive tape..    Objective   Mental Status Exam  Appearance:  clean and casually dressed, appropriate  Attitude toward clinician:  cooperative and agreeable   Speech:    Rate:  regular rate and rhythm   Volume:  normal  Motor:  no abnormal movements present  Mood:  depressed  Affect:  dysphoric and mood congruent  Thought Processes:  linear, logical, and goal directed  Thought Content:  normal  Suicidal Thoughts:  absent  Homicidal Thoughts:  absent  Perceptual Disturbance: no perceptual disturbance  Attention and Concentration:  fair  Insight and Judgement:  fair  Memory:  memory appears to be intact    Lab Review:   Admission on 08/21/2023, Discharged on  08/21/2023   Component Date Value    Glucose 08/21/2023 109 (H)     BUN 08/21/2023 11     Creatinine 08/21/2023 0.65     Sodium 08/21/2023 136     Potassium 08/21/2023 3.7     Chloride 08/21/2023 101     CO2 08/21/2023 25.1     Calcium 08/21/2023 8.4 (L)     Total Protein 08/21/2023 7.1     Albumin 08/21/2023 4.2     ALT (SGPT) 08/21/2023 28     AST (SGOT) 08/21/2023 27     Alkaline Phosphatase 08/21/2023 93     Total Bilirubin 08/21/2023 0.2     Globulin 08/21/2023 2.9     A/G Ratio 08/21/2023 1.4     BUN/Creatinine Ratio 08/21/2023 16.9     Anion Gap 08/21/2023 9.9     eGFR 08/21/2023 109.4     HS Troponin T 08/21/2023 <6     proBNP 08/21/2023 <36.0     Strep A Ag 08/21/2023 Negative     COVID19 08/21/2023 Not Detected     Influenza A PCR 08/21/2023 Not Detected     Influenza B PCR 08/21/2023 Not Detected     WBC 08/21/2023 9.57     RBC 08/21/2023 4.38     Hemoglobin 08/21/2023 12.7     Hematocrit 08/21/2023 39.9     MCV 08/21/2023 91.1     MCH 08/21/2023 29.0     MCHC 08/21/2023 31.8     RDW 08/21/2023 13.0     RDW-SD 08/21/2023 43.2     MPV 08/21/2023 11.2     Platelets 08/21/2023 327     Neutrophil % 08/21/2023 68.0     Lymphocyte % 08/21/2023 20.5     Monocyte % 08/21/2023 7.2     Eosinophil % 08/21/2023 3.3     Basophil % 08/21/2023 0.4     Immature Grans % 08/21/2023 0.6 (H)     Neutrophils, Absolute 08/21/2023 6.50     Lymphocytes, Absolute 08/21/2023 1.96     Monocytes, Absolute 08/21/2023 0.69     Eosinophils, Absolute 08/21/2023 0.32     Basophils, Absolute 08/21/2023 0.04     Immature Grans, Absolute 08/21/2023 0.06 (H)     nRBC 08/21/2023 0.0     Throat Culture, Beta Str* 08/21/2023 No Beta Hemolytic Streptococcus Isolated    Labs reviewed    Plan of Care  Pt with bipolar II disorder, current episode depressed, current polypharmacy for treatment of ADHD, depression, and anxiety.  Differential dx include MDD, IZABELA, OCD.   Plan to discuss case with Dr. Gonzalez/ Sunitha and Dr. Wakefield, specifically regarding  use of stimulants, traditional antidepressant in light of current diagnosis.   Recommend reducing benzodiazapine, although unsure of current use. Consider importance of intentional taper to avoid WD.  Pt agrees with plan to develop plan of care by next weds when able to seek appropriate supervision, collaboration with existing team. Denies risk to self or others. Strongly recommended no alcohol use, specifically considering current potential for respiratory suppression on current medications, further risk of depression, impulsivity.  FU scheduled in two weeks.    9/26/23 call placed to the [facility] to collaborate with existing provider, Sumeet PADILLA.  9/27/32 Case discussed with Dr. Wakefield considering low dose atypical alongside Lamictal titration. Anticipate increasing lamictal and considering simplification of atypical, specifically considering metabolic impact. Will assist with benzo discontinuation. Recommend reduction of stimulant to 40 mg daily. Considered risks alongside untreated bipolar disorder.  9/29/23 call placed to patient to review discussion, request records from previous psychiatrist. Unable to leave message.    Diagnoses and all orders for this visit:    1. Bipolar II disorder (Primary)    2. Ductal carcinoma in situ (DCIS) of right breast

## 2023-10-02 ENCOUNTER — TELEPHONE (OUTPATIENT)
Dept: GASTROENTEROLOGY | Facility: CLINIC | Age: 48
End: 2023-10-02
Payer: COMMERCIAL

## 2023-10-02 ENCOUNTER — PREP FOR SURGERY (OUTPATIENT)
Dept: OTHER | Facility: HOSPITAL | Age: 48
End: 2023-10-02
Payer: COMMERCIAL

## 2023-10-02 DIAGNOSIS — Z83.719 FH: COLON POLYPS: Primary | ICD-10-CM

## 2023-10-02 NOTE — TELEPHONE ENCOUNTER
NO PERSONAL HX OF POLYPS------FAMILY HX OF POLYPS------NO FAMILY HX OF COLON CA---- LIST OF MEDICATION                      ASPIRIN 81 PO  gabapentin (NEURONTIN) 300 MG capsule  LORazepam (ATIVAN) 2 MG tablet  losartan (COZAAR) 50 MG tablet  metoprolol tartrate (LOPRESSOR) 50 MG tablet  pravastatin (PRAVACHOL) 20 MG tablet  tamoxifen (NOLVADEX) 20 MG chemo tablet  venlafaxine XR (EFFEXOR-XR) 150 MG 24 hr capsule  venlafaxine XR (EFFEXOR-XR) 75 MG 24 hr capsule  Vyvanse 40 MG capsule                OA QUESTIONNAIRE SCANNED IN MEDIA

## 2023-10-04 ENCOUNTER — TELEPHONE (OUTPATIENT)
Dept: GASTROENTEROLOGY | Facility: CLINIC | Age: 48
End: 2023-10-04
Payer: COMMERCIAL

## 2023-10-05 ENCOUNTER — TELEPHONE (OUTPATIENT)
Dept: GASTROENTEROLOGY | Facility: CLINIC | Age: 48
End: 2023-10-05
Payer: COMMERCIAL

## 2023-10-06 ENCOUNTER — TELEPHONE (OUTPATIENT)
Dept: GASTROENTEROLOGY | Facility: CLINIC | Age: 48
End: 2023-10-06
Payer: COMMERCIAL

## 2023-10-26 ENCOUNTER — TELEPHONE (OUTPATIENT)
Dept: GASTROENTEROLOGY | Facility: CLINIC | Age: 48
End: 2023-10-26
Payer: COMMERCIAL

## 2023-10-26 PROBLEM — Z83.719 FH: COLON POLYPS: Status: ACTIVE | Noted: 2023-10-02

## 2023-10-26 NOTE — TELEPHONE ENCOUNTER
COLONOSCOPY  01/17/2024  arrive at  1PM [facility]  . mailed prep instructions to verified address on file....miralax OK FOR HUB TO READ

## 2023-12-04 ENCOUNTER — TELEPHONE (OUTPATIENT)
Dept: RADIATION ONCOLOGY | Facility: HOSPITAL | Age: 48
End: 2023-12-04
Payer: COMMERCIAL

## 2023-12-08 ENCOUNTER — TELEPHONE (OUTPATIENT)
Dept: RADIATION ONCOLOGY | Facility: HOSPITAL | Age: 48
End: 2023-12-08
Payer: COMMERCIAL

## 2023-12-11 ENCOUNTER — OFFICE VISIT (OUTPATIENT)
Dept: RADIATION ONCOLOGY | Facility: HOSPITAL | Age: 48
End: 2023-12-11
Payer: COMMERCIAL

## 2023-12-11 VITALS
OXYGEN SATURATION: 100 % | BODY MASS INDEX: 44.73 KG/M2 | DIASTOLIC BLOOD PRESSURE: 89 MMHG | HEART RATE: 107 BPM | SYSTOLIC BLOOD PRESSURE: 126 MMHG | WEIGHT: 285 LBS

## 2023-12-11 DIAGNOSIS — D05.11 DUCTAL CARCINOMA IN SITU (DCIS) OF RIGHT BREAST: Primary | ICD-10-CM

## 2023-12-11 PROCEDURE — G0463 HOSPITAL OUTPT CLINIC VISIT: HCPCS | Performed by: RADIOLOGY

## 2023-12-11 RX ORDER — LAMOTRIGINE 100 MG/1
TABLET ORAL
COMMUNITY
Start: 2023-12-08

## 2023-12-11 RX ORDER — LORAZEPAM 1 MG/1
TABLET ORAL
COMMUNITY
Start: 2023-11-14

## 2023-12-11 NOTE — PROGRESS NOTES
Subjective     No ref. provider found     Cancer Staging   CC: 14 month follow up for DCIS right breast                               S:  I had the pleasure of seeing Radha Torre  today in the [facility]. She is a pleasant 48 year old female with DCIS right breast, ER NE Positive with widely negative margins.  She returns today for follow up now 14 months out from completion of her radiation therapy to the right breast.  She has been doing well since I last saw her.  She completed a dose of 4256cGy in 16 fractions on 9/27/22.  She had a screening mammogram on 3/15/23 which was benign.     Review of Systems   Constitutional: Negative.    Musculoskeletal:  Positive for arthralgias.   Skin: Negative.    Psychiatric/Behavioral: Negative.         Past Medical History:   Diagnosis Date    Abnormal ECG     Acid reflux     ON OCC    Anesthesia complication     BP DROPPED DUE TO EPIDURAL FROM C-SECTIONS    Anxiety     Asthma 2017    Occasional reactive asthma/bronchitis after respiratory illness    At risk for sleep apnea     STOP BANG 5    Breast cancer 05/13/2022    Right breast ductal carcinoma in situ, ER/NE positive, Ki-67 5%    Depression 2004    Postpartum with 3 births    Deviated septum     Fatigue     Fissure, anal     History of UTI     Hyperlipidemia     Hypertension 2017    noted at last few urgent care visits    Insomnia     Migraine     Obesity     Pain in elbow joint     bilateral    PVC (premature ventricular contraction) 9/8/2022    Rectal bleeding     RELATED TO RECTAL FISSUE    Seasonal allergic rhinitis          Past Surgical History:   Procedure Laterality Date    BREAST BIOPSY Right 05/16/2022    Tomosynthesis guided Mammotome vacuum assisted right breast 4 o'clock position-Dr. Desmond Zamarripa, [facility]    BREAST LUMPECTOMY Right 07/20/2022    Procedure: RIGHT BREAST WIRE LOCALIZED LUMPECTOMY FOR DCIS;  Surgeon: Daisy Romero MD;  Location: [facility];  Service: General;  Laterality:  Right;    BREAST SURGERY Bilateral 2022    Procedure: RIGHT BREAST ONCOPLASTIC CLOSURE LEFT BREST REDUCTION FOR SYMMETRY;  Surgeon: Rebecca Bhakta MD;  Location: [facility];  Service: Plastics;  Laterality: Bilateral;     SECTION N/A     x 3    D & C HYSTEROSCOPY ENDOMETRIAL ABLATION N/A 2018    Procedure: DILATATION AND CURETTAGE HYSTEROSCOPY NOVASURE ENDOMETRIAL ABLATION;  Surgeon: Puneet Carreno MD;  Location: [facility];  Service: Obstetrics/Gynecology    MOHS SURGERY Left     Left Elbow & Left Scalp    REDUCTION MAMMAPLASTY      SKIN LESION EXCISION Right     Right Breast, Benign    TUBAL ABDOMINAL LIGATION Bilateral 2013    after last     WISDOM TOOTH EXTRACTION Bilateral          Social History     Socioeconomic History    Marital status:    Tobacco Use    Smoking status: Former     Packs/day: 1.00     Years: 5.00     Additional pack years: 0.00     Total pack years: 5.00     Types: Cigarettes     Quit date: 2001     Years since quittin.5    Smokeless tobacco: Never   Vaping Use    Vaping Use: Never used   Substance and Sexual Activity    Alcohol use: Yes     Alcohol/week: 2.0 standard drinks of alcohol     Types: 2 Drinks containing 0.5 oz of alcohol per week     Comment: Socially; few times a month    Drug use: Never    Sexual activity: Yes     Partners: Male     Birth control/protection: Surgical         Family History   Problem Relation Age of Onset    Anxiety disorder Mother     Depression Mother     Stroke Mother     Skin cancer Mother         BCE SCE    Colon polyps Mother     Breast cancer Mother 74        DCIS with invasive. DCIS Left Breast 2:30 position ER/KY +, Her2-. Left Breast UOQ Invasive DCIS is multifocal, Two benign sentinel lymph nodes, Pathologic stage: pT1a, N(sn)0.    Arthritis Mother     Cancer Mother         DCIS and invasive    Alcohol abuse Brother     Autism Son     Learning disabilities Son         Autism     Depression Son     Depression Maternal Grandmother     Anxiety disorder Maternal Grandmother     Hyperlipidemia Maternal Grandmother     Hypertension Maternal Grandmother     Heart disease Maternal Grandmother     Arthritis Maternal Grandmother     Stroke Maternal Grandmother     Coronary artery disease Maternal Grandfather     Esophageal cancer Paternal Grandfather 85    Colon cancer Neg Hx     Malig Hyperthermia Neg Hx           Objective    Physical Exam  Constitutional:       Appearance: Normal appearance.   Chest:          Comments: Right breast slightly larger than left, no palpable masses in either breast or axilla  Neurological:      Mental Status: She is alert.         Current Outpatient Medications on File Prior to Visit   Medication Sig Dispense Refill    ASPIRIN 81 PO Take 81 mg by mouth Daily.      cetirizine (zyrTEC) 10 MG tablet Take 1 tablet by mouth Daily. Rotates around with other allergy medicaitons      gabapentin (NEURONTIN) 300 MG capsule       ipratropium-albuterol (DUO-NEB) 0.5-2.5 mg/3 ml nebulizer Take 3 mL by nebulization Every 4 (Four) Hours As Needed for Wheezing or Shortness of Air. (Patient taking differently: Take 3 mL by nebulization Every 4 (Four) Hours As Needed for Wheezing or Shortness of Air. prn) 360 mL 2    levoFLOXacin (Levaquin) 500 MG tablet Take 1 tablet by mouth Daily. 10 tablet 0    LORazepam (ATIVAN) 2 MG tablet       losartan (COZAAR) 50 MG tablet Take 1 tablet by mouth Daily.      metoprolol tartrate (LOPRESSOR) 50 MG tablet TAKE ONE TABLET BY MOUTH TWICE A  tablet 1    multivitamin with minerals tablet tablet Take 1 tablet by mouth Daily. (Patient not taking: Reported on 8/3/2023)      pravastatin (PRAVACHOL) 20 MG tablet TAKE ONE TABLET BY MOUTH ONCE NIGHTLY 90 tablet 1    predniSONE (DELTASONE) 20 MG tablet Take 3 tabs p.o. daily on day 1, then 2 tabs p.o. on days 2-3, then 1 tab p.o. on days 4-5. 9 tablet 0    tamoxifen (NOLVADEX) 20 MG chemo tablet Take 1  tablet by mouth Daily. 90 tablet 3    venlafaxine XR (EFFEXOR-XR) 150 MG 24 hr capsule       venlafaxine XR (EFFEXOR-XR) 75 MG 24 hr capsule       Vyvanse 40 MG capsule       [DISCONTINUED] Fluticasone Furoate-Vilanterol (Breo Ellipta) 200-25 MCG/INH inhaler Inhale 1 puff Daily. 1 each 0     No current facility-administered medications on file prior to visit.       ALLERGIES:    Allergies   Allergen Reactions    Bupropion Palpitations     POSSIBLE    Adhesive Tape Rash     SWELLS. Localized if left on for extended periods of time       There were no vitals taken for this visit.         8/3/2023    11:47 AM   Current Status   ECOG score 0         Assessment & Plan     48 year old female with DCIS right breast, ER DE Positive with widely negative margins now 14 months out from adjuvant radiation and doing well.  She will due for her yearly mammogram in March 2024 and follow up with Dr. Romero shortly after.  She will have regular follow up with her medical oncologist.  I have not scheduled a follow up with me but I asked her to call with any questions or concerns in the future.     I personally spent greater than 15 minutes today assessing, managing, discussing and documenting my visit with the patient. That time includes review of records, imaging and pathology reports, obtaining my own history, performing a medically appropriate evaluation, counseling and educating the patient, discussing goals, logistics, alternatives and risks of my recommendations, surveillance options and potential outcomes. It also includes the time documenting the clinical information in the EMR and communicating my recommendations to the other involved physicians.                 Thank you very much for allowing me to participate in the care of this very pleasant patient.    Sincerely,      Yary Anderson MD

## 2023-12-18 ENCOUNTER — TELEPHONE (OUTPATIENT)
Dept: FAMILY MEDICINE CLINIC | Facility: CLINIC | Age: 48
End: 2023-12-18
Payer: COMMERCIAL

## 2023-12-18 NOTE — TELEPHONE ENCOUNTER
Caller: Radha Torre    Relationship: Self    Best call back number: [phone]     Who is your current provider: HERI BEATTY     Is your current provider offboarding? NO    Who would you like your new provider to be: DR SANDOVAL     What are your reasons for transferring care: PATIENT STATES HER ONCOLOGIST WOULD LIKE HER TO TRANSFER HER CARE OVER TO AN INTERNAL MEDICINE MD    Additional notes: \PATIENT WOULD LIKE TO KNOW IF SHE IS ABLE TO GET SCHEDULED WITH DR SANDOVAL    PLEASE CALL AND ADVISE

## 2023-12-19 NOTE — TELEPHONE ENCOUNTER
Advised the patient the provider work together here. They  work together to cover each other for after hours, holidays and etc.

## 2023-12-29 ENCOUNTER — TELEPHONE (OUTPATIENT)
Dept: CARDIOLOGY | Facility: CLINIC | Age: 48
End: 2023-12-29

## 2023-12-29 NOTE — TELEPHONE ENCOUNTER
Caller: Radha Torre    Relationship to patient: Self    Best call back number: [phone]    Type of visit: FOLLOW UP     If rescheduling, when is the original appointment: 9.14.23     Additional notes:PT CALLED IN NEEDING TO R/S APPT. RESCHEDULED PT FOR 1.4.24 DUE TO NEXT ALMA. PLEASE ADVISE APPT DATE IS OKAY SINPCE OUTSIDE OF 3 WEEK TIMEFRAME TO RESCHEDULE WITHIN, OR CONTACT PT TO OFFER A SOONER APPT IF POSSIBLE

## 2024-01-02 ENCOUNTER — PATIENT MESSAGE (OUTPATIENT)
Dept: CARDIOLOGY | Facility: CLINIC | Age: 49
End: 2024-01-02
Payer: COMMERCIAL

## 2024-01-02 RX ORDER — METOPROLOL TARTRATE 50 MG/1
50 TABLET, FILM COATED ORAL 2 TIMES DAILY
Qty: 60 TABLET | Refills: 0 | Status: SHIPPED | OUTPATIENT
Start: 2024-01-02

## 2024-01-04 ENCOUNTER — OFFICE VISIT (OUTPATIENT)
Dept: CARDIOLOGY | Facility: CLINIC | Age: 49
End: 2024-01-04
Payer: COMMERCIAL

## 2024-01-04 VITALS
OXYGEN SATURATION: 95 % | HEIGHT: 67 IN | BODY MASS INDEX: 45.67 KG/M2 | WEIGHT: 291 LBS | DIASTOLIC BLOOD PRESSURE: 84 MMHG | SYSTOLIC BLOOD PRESSURE: 128 MMHG | HEART RATE: 77 BPM

## 2024-01-04 DIAGNOSIS — I10 ESSENTIAL HYPERTENSION: ICD-10-CM

## 2024-01-04 DIAGNOSIS — I49.3 PVC (PREMATURE VENTRICULAR CONTRACTION): Primary | Chronic | ICD-10-CM

## 2024-01-04 DIAGNOSIS — D05.11 DUCTAL CARCINOMA IN SITU (DCIS) OF RIGHT BREAST: ICD-10-CM

## 2024-01-04 PROCEDURE — 99214 OFFICE O/P EST MOD 30 MIN: CPT | Performed by: INTERNAL MEDICINE

## 2024-01-04 PROCEDURE — 93000 ELECTROCARDIOGRAM COMPLETE: CPT | Performed by: INTERNAL MEDICINE

## 2024-01-04 NOTE — PROGRESS NOTES
Subjective:     Encounter Date:01/04/24      Patient ID: Radha Torre is a 48 y.o. female.    Chief Complaint:  History of Present Illness    Dear Sofia,    I had the pleasure of seeing this patient in the office today for follow-up of her cardiac status, she has a history of PVCs.    Patient has a known history of PVCs.  I saw her initially September 2022 for this.  She reports that she is previously been following with Dr. Foreman.  Years ago she saw Dr. Nicholas.  He has been known to have PVCs for very long time.  She states that more recently she saw Dr. Foreman had an evaluation including a 30-day monitor.  She was having fairly frequent PVCs that were symptomatic.  She is not sure exactly what the monitor other testing showed patient was placed on metoprolol and takes metoprolol tartrate 50 mg twice daily.      Generally been doing well.  Occasional palpitations, really no associated symptoms.  No dizziness or lightheadedness, has not passed out at any time.  No chest pain or chest discomfort.    She has a history of right-sided breast cancer.  She has undergone a lumpectomy which was performed July 20, 2022.  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  All margins were negative.  She is currently undergoing radiation therapy to the right breast.  She has not received chemotherapy.    No known history of coronary disease, congestive heart failure, rheumatic fever, rheumatic heart disease, or congenital heart disease.    The following portions of the patient's history were reviewed and updated as appropriate: allergies, current medications, past family history, past medical history, past social history, past surgical history and problem list.      ECG 12 Lead    Date/Time: 1/4/2024 10:54 AM  Performed by: Jay Jeffrey III, MD    Authorized by: Jay Jeffrey III, MD  Comparison: compared with previous ECG   Similar to previous ECG  Rhythm: sinus rhythm  Rate: normal  Conduction:  "conduction normal  ST Segments: ST segments normal  T Waves: T waves normal  QRS axis: normal  Other: no other findings    Clinical impression: normal ECG             Objective:     Vitals:    01/04/24 1031   BP: 128/84   BP Location: Left arm   Patient Position: Sitting   Cuff Size: Adult   Pulse: 77   SpO2: 95%   Weight: 132 kg (291 lb)   Height: 170.2 cm (67\")       Body mass index is 45.58 kg/m².      Vitals reviewed.   Constitutional:       General: Not in acute distress.     Appearance: Well-developed. Not diaphoretic.   Eyes:      General:         Right eye: No discharge.         Left eye: No discharge.      Conjunctiva/sclera: Conjunctivae normal.      Pupils: Pupils are equal, round, and reactive to light.   HENT:      Head: Normocephalic and atraumatic.      Nose: Nose normal.   Neck:      Thyroid: No thyromegaly.      Trachea: No tracheal deviation.      Lymphadenopathy: No cervical adenopathy.   Pulmonary:      Effort: Pulmonary effort is normal. No respiratory distress.      Breath sounds: Normal breath sounds. No stridor.   Chest:      Chest wall: Not tender to palpatation.   Cardiovascular:      Normal rate. Regular rhythm.      Murmurs: There is no murmur.      . No S3 gallop. No click. No rub.   Pulses:     Intact distal pulses.   Edema:     Peripheral edema absent.   Abdominal:      General: Bowel sounds are normal. There is no distension.      Palpations: Abdomen is soft. There is no abdominal mass.      Tenderness: There is no abdominal tenderness. There is no guarding or rebound.   Musculoskeletal: Normal range of motion.         General: No tenderness or deformity.      Cervical back: Normal range of motion and neck supple. Skin:     General: Skin is warm and dry.      Findings: No erythema or rash.   Neurological:      Mental Status: Alert and oriented to person, place, and time.      Deep Tendon Reflexes: Reflexes are normal and symmetric.   Psychiatric:         Thought Content: Thought " content normal.         Data and records reviewed:     Lab Results   Component Value Date    GLUCOSE 109 (H) 08/21/2023    BUN 11 08/21/2023    CREATININE 0.65 08/21/2023    EGFRIFNONA 100 01/10/2022    EGFRIFAFRI 115 01/10/2022    BCR 16.9 08/21/2023    K 3.7 08/21/2023    CO2 25.1 08/21/2023    CALCIUM 8.4 (L) 08/21/2023    PROTENTOTREF 7.7 01/10/2022    ALBUMIN 4.2 08/21/2023    LABIL2 1.5 01/10/2022    AST 27 08/21/2023    ALT 28 08/21/2023     Lab Results   Component Value Date    CHOL 162 04/17/2023     Lab Results   Component Value Date    TRIG 212 (H) 04/17/2023    TRIG 412 (H) 01/10/2022    TRIG 169 (H) 08/01/2019     Lab Results   Component Value Date    HDL 46 04/17/2023    HDL 45 01/10/2022    HDL 45 08/01/2019     Lab Results   Component Value Date    LDL 81 04/17/2023     (H) 01/10/2022     (H) 08/01/2019     Lab Results   Component Value Date    VLDL 35 04/17/2023    VLDL 75 (H) 01/10/2022    VLDL 33.8 08/01/2019     Lab Results   Component Value Date    LDLHDL 1.60 04/17/2023    LDLHDL 3.0 01/10/2022     CBC          4/17/2023    10:17 8/21/2023    05:30   CBC   WBC 6.13  9.57    RBC 4.27  4.38    Hemoglobin 12.4  12.7    Hematocrit 38.4  39.9    MCV 89.9  91.1    MCH 29.0  29.0    MCHC 32.3  31.8    RDW 12.7  13.0    Platelets 287  327      No radiology results for the last 90 days.            Assessment:          Diagnosis Plan   1. PVC (premature ventricular contraction)  ECG 12 Lead      2. Essential hypertension  ECG 12 Lead      3. Ductal carcinoma in situ (DCIS) of right breast  ECG 12 Lead               Plan:       1.  PVCs-on metoprolol, sounds like she is doing well.  Continue same, we discussed exercise recommendations.  2.  Hypertension, on losartan and metoprolol, blood pressure is good control today, we will follow  3.  Patient reports daily aspirin use, can stop from my standpoint    Thank you very much for allowing us to participate in the care of this pleasant patient.   Please don't hesitate to call if I can be of assistance in any way.      Current Outpatient Medications:     ASPIRIN 81 PO, Take 81 mg by mouth Daily., Disp: , Rfl:     gabapentin (NEURONTIN) 300 MG capsule, , Disp: , Rfl:     lamoTRIgine (LaMICtal) 100 MG tablet, , Disp: , Rfl:     LORazepam (ATIVAN) 1 MG tablet, , Disp: , Rfl:     losartan (COZAAR) 50 MG tablet, Take 1 tablet by mouth Daily., Disp: , Rfl:     metoprolol tartrate (LOPRESSOR) 50 MG tablet, Take 1 tablet by mouth 2 (Two) Times a Day., Disp: 60 tablet, Rfl: 0    pravastatin (PRAVACHOL) 20 MG tablet, TAKE ONE TABLET BY MOUTH ONCE NIGHTLY, Disp: 90 tablet, Rfl: 1    tamoxifen (NOLVADEX) 20 MG chemo tablet, Take 1 tablet by mouth Daily., Disp: 90 tablet, Rfl: 3    venlafaxine XR (EFFEXOR-XR) 150 MG 24 hr capsule, , Disp: , Rfl:     venlafaxine XR (EFFEXOR-XR) 75 MG 24 hr capsule, , Disp: , Rfl:     Vyvanse 40 MG capsule, , Disp: , Rfl:          No follow-ups on file.

## 2024-01-09 ENCOUNTER — OFFICE VISIT (OUTPATIENT)
Dept: FAMILY MEDICINE CLINIC | Facility: CLINIC | Age: 49
End: 2024-01-09
Payer: COMMERCIAL

## 2024-01-09 VITALS
WEIGHT: 293 LBS | TEMPERATURE: 97.3 F | DIASTOLIC BLOOD PRESSURE: 80 MMHG | HEART RATE: 104 BPM | BODY MASS INDEX: 45.99 KG/M2 | HEIGHT: 67 IN | SYSTOLIC BLOOD PRESSURE: 118 MMHG | OXYGEN SATURATION: 98 %

## 2024-01-09 DIAGNOSIS — I49.3 PVC (PREMATURE VENTRICULAR CONTRACTION): Chronic | ICD-10-CM

## 2024-01-09 DIAGNOSIS — I10 ESSENTIAL HYPERTENSION: ICD-10-CM

## 2024-01-09 DIAGNOSIS — E66.01 CLASS 3 SEVERE OBESITY WITH BODY MASS INDEX (BMI) OF 45.0 TO 49.9 IN ADULT, UNSPECIFIED OBESITY TYPE, UNSPECIFIED WHETHER SERIOUS COMORBIDITY PRESENT: Primary | ICD-10-CM

## 2024-01-09 DIAGNOSIS — H92.09 OTALGIA, UNSPECIFIED LATERALITY: ICD-10-CM

## 2024-01-09 PROCEDURE — 99214 OFFICE O/P EST MOD 30 MIN: CPT | Performed by: STUDENT IN AN ORGANIZED HEALTH CARE EDUCATION/TRAINING PROGRAM

## 2024-01-09 RX ORDER — AMOXICILLIN AND CLAVULANATE POTASSIUM 875; 125 MG/1; MG/1
TABLET, FILM COATED ORAL
COMMUNITY
Start: 2024-01-05 | End: 2024-01-16

## 2024-01-09 NOTE — PROGRESS NOTES
"Chief Complaint  Earache (Pt complains of left sided ear pain/-stases she was given an antibiotic but does not believe its completely gone (she is on day 4)) and Establish Care (Pt would like to discuss weight loss meds /-requested refills on all meds (cardiology refills some))    Subjective        Radha Torre presents to [facility]  History of Present Illness  48-year-old female with hypertension, DCIS of the right breast s/p lumpectomy, PVCs who presents to establish care today.    She has experienced a lot of of depression related to her diagnosis of breast cancer and some marital problems.  She has noticed a lot of weight gain with this.    [facility] who is currently making medication adjustments with regard to her lorazepam and gabapentin.  She was recently started on Lamictal.  She is also taking Effexor.    She is on tamoxifen and experienced a lot of hot flashes which has resolved since being on Effexor and gabapentin.  She has joint pain from tamoxifen.    She has experienced PVCs for many years and is on metoprolol.  She had a Holter monitor that showed evidence of PVCs a little over 5 years ago.  She follows with Dr. Jay Jeffrey with cardiology.  She is also on pravastatin for hyperlipidemia and losartan for hypertension.  She does take a baby aspirin for primary prevention.  Cardiology stated that she could recently stop this.      Objective   Vital Signs:  /80 (BP Location: Right arm, Patient Position: Sitting, Cuff Size: Large Adult)   Pulse 104   Temp 97.3 °F (36.3 °C) (Infrared)   Ht 170.2 cm (67.01\")   Wt 133 kg (293 lb)   SpO2 98%   BMI 45.88 kg/m²   Estimated body mass index is 45.88 kg/m² as calculated from the following:    Height as of this encounter: 170.2 cm (67.01\").    Weight as of this encounter: 133 kg (293 lb).               Physical Exam  Constitutional:       General: She is not in acute distress.  HENT:      " Right Ear: Tympanic membrane normal.      Left Ear: Tympanic membrane normal.      Ears:      Comments: Excoriation of left ear, noninfected.  Eyes:      Conjunctiva/sclera: Conjunctivae normal.   Cardiovascular:      Rate and Rhythm: Normal rate and regular rhythm.   Pulmonary:      Effort: Pulmonary effort is normal. No respiratory distress.      Breath sounds: Normal breath sounds.   Abdominal:      Palpations: Abdomen is soft.      Tenderness: There is no abdominal tenderness. There is no guarding or rebound.   Skin:     General: Skin is warm and dry.   Neurological:      Mental Status: She is alert and oriented to person, place, and time.   Psychiatric:         Mood and Affect: Mood normal.         Behavior: Behavior normal.        Result Review :  The following data was reviewed by: Abigail Pennington MD on 01/09/2024:  Common labs          4/17/2023    10:17 8/21/2023    05:30   Common Labs   Glucose 94  109    BUN 8  11    Creatinine 0.66  0.65    Sodium 141  136    Potassium 4.3  3.7    Chloride 106  101    Calcium 8.9  8.4    Albumin 4.0  4.2    Total Bilirubin 0.3  0.2    Alkaline Phosphatase 75  93    AST (SGOT) 28  27    ALT (SGPT) 24  28    WBC 6.13  9.57    Hemoglobin 12.4  12.7    Hematocrit 38.4  39.9    Platelets 287  327    Total Cholesterol 162     Triglycerides 212     HDL Cholesterol 46     LDL Cholesterol  81     Hemoglobin A1C 5.50       Data reviewed : None             Assessment and Plan   Diagnoses and all orders for this visit:    1. Class 3 severe obesity with body mass index (BMI) of 45.0 to 49.9 in adult, unspecified obesity type, unspecified whether serious comorbidity present (Primary)  Assessment & Plan:  Patient's (Body mass index is 45.88 kg/m².)   Complications include HLD    Weight loss options discussed including GLP1 agonist. Risks and benefits of each were discussed in detail. Patient does not have personal history of pancreatitis, or thyroid cancer. No FHx of thyroid cancer.  Discussed that these medications are to be used in conjunction with diet and exercise.      Patient was counseled regarding a healthy diet high in whole grains, omega 3 fats, fruits and vegetables. She was counseled regarding recommendations for atleast 150minutes of moderate exercise such as walking weekly. Goal of atleast 500kcal reduction for weight loss.     Orders:  -     Semaglutide-Weight Management 0.25 MG/0.5ML solution auto-injector; Inject 0.25 mg under the skin into the appropriate area as directed 1 (One) Time Per Week.  Dispense: 2 mL; Refill: 0    2. Otalgia, unspecified laterality  Comments:  Continue antibiotic. small excoriation on exam. Avoid qtips.    Other orders  -     Tdap Vaccine => 8yo IM (BOOSTRIX)  -     Pneumococcal Conjugate Vaccine 20-Valent (PCV20)             Follow Up   No follow-ups on file.  Patient was given instructions and counseling regarding her condition or for health maintenance advice. Please see specific information pulled into the AVS if appropriate.

## 2024-01-11 RX ORDER — PRAVASTATIN SODIUM 20 MG
20 TABLET ORAL NIGHTLY
Qty: 90 TABLET | Refills: 1 | Status: SHIPPED | OUTPATIENT
Start: 2024-01-11

## 2024-01-15 ENCOUNTER — PRIOR AUTHORIZATION (OUTPATIENT)
Dept: FAMILY MEDICINE CLINIC | Facility: CLINIC | Age: 49
End: 2024-01-15
Payer: COMMERCIAL

## 2024-01-15 PROBLEM — E66.01 CLASS 3 SEVERE OBESITY WITH BODY MASS INDEX (BMI) OF 45.0 TO 49.9 IN ADULT: Status: ACTIVE | Noted: 2024-01-15

## 2024-01-15 PROBLEM — IMO0001 CLASS 3 OBESITY IN ADULT: Status: RESOLVED | Noted: 2018-05-11 | Resolved: 2024-01-15

## 2024-01-15 PROBLEM — E66.813 CLASS 3 SEVERE OBESITY WITH BODY MASS INDEX (BMI) OF 45.0 TO 49.9 IN ADULT: Status: ACTIVE | Noted: 2024-01-15

## 2024-01-15 NOTE — TELEPHONE ENCOUNTER
PA has been started and sent to plan for wegovy 0.25mg. PA awaits response from plan     PA was denied due to BMI being undocumented 1/15/24    PA was resubmitted and awaits response from plan 1/16/24

## 2024-01-15 NOTE — ASSESSMENT & PLAN NOTE
Patient's (Body mass index is 45.88 kg/m².)   Complications include HLD, hypertension    Weight loss options discussed including GLP1 agonist. Risks and benefits of each were discussed in detail. Patient does not have personal history of pancreatitis, or thyroid cancer. No FHx of thyroid cancer. Discussed that these medications are to be used in conjunction with diet and exercise.      Patient was counseled regarding a healthy diet high in whole grains, omega 3 fats, fruits and vegetables. She was counseled regarding recommendations for atleast 150minutes of moderate exercise such as walking weekly. Goal of atleast 500kcal reduction for weight loss.

## 2024-01-15 NOTE — ASSESSMENT & PLAN NOTE
Well-controlled on losartan 50 mg daily and metoprolol 50 mg twice daily.  Continue.  Goal BP less than 140/90

## 2024-01-16 ENCOUNTER — TELEPHONE (OUTPATIENT)
Dept: GASTROENTEROLOGY | Facility: CLINIC | Age: 49
End: 2024-01-16
Payer: COMMERCIAL

## 2024-01-16 DIAGNOSIS — E66.01 CLASS 3 SEVERE OBESITY WITH BODY MASS INDEX (BMI) OF 45.0 TO 49.9 IN ADULT, UNSPECIFIED OBESITY TYPE, UNSPECIFIED WHETHER SERIOUS COMORBIDITY PRESENT: ICD-10-CM

## 2024-01-16 NOTE — TELEPHONE ENCOUNTER
Hub staff attempted to follow warm transfer process and was unsuccessful     Caller: Radha Torre    Relationship to patient: Self    Best call back number: [phone]    Patient is needing: PATIENT CALLED IN, SHE SAID HER PREP INSTRUCTIONS WERE SENT IN A MESSAGE ON [employer] FOR HER COLONOSCOPY FOR 1/17/24 BUT THE MESSAGE WAS ALL JUMBLED UP. SHE IS REQUESTING THE PREP INSTRUCTIONS BE SENT TO HER AGAIN IN [employer] WHERE IT'S MORE CLEAR TO READ AND UNDERSTAND. SHE ALSO TAKES EFFEXOR THE MORNING OF THE PROCEDURE? SHE SAID SHE HAS HEARD THE SIDE EFFECTS OF NOT TAKING IT ARE BAD SO SHE WANTS TO SEE IF SHE CAN STILL TAKE THAT. PLEASE CALL PATIENT AS SOON AS POSSIBLE TO DISCUSS THE CLINICAL QUESTION AND PLEASE POST PREP INSTRUCTIONS AS SOON AS POSSIBLE AS SHE STARTS PREP IN A FEW HOURS.

## 2024-01-16 NOTE — TELEPHONE ENCOUNTER
Hub staff attempted to follow warm transfer process and was unsuccessful     Caller: Radha Torre    Relationship to patient: Self    Best call back number: [phone]    Patient is needing: PATIENT CALLED IN, SHE SAID HER PREP INSTRUCTIONS WERE SENT IN A MESSAGE ON [employer] FOR HER COLONOSCOPY FOR 1/17/24 BUT THE MESSAGE WAS ALL JUMBLED UP. SHE IS REQUESTING THE PREP INSTRUCTIONS BE SENT TO HER AGAIN IN [employer] WHERE IT'S MORE CLEAR TO READ AND UNDERSTAND. SHE ALSO TAKES EFFEXOR THE MORNING OF THE PROCEDURE? SHE SAID SHE HAS HEARD THE SIDE EFFECTS OF NOT TAKING IT ARE BAD SO SHE WANTS TO SEE IF SHE CAN STILL TAKE THAT. PLEASE CALL PATIENT AS SOON AS POSSIBLE TO DISCUSS THE CLINICAL QUESTION AND PLEASE POST PREP INSTRUCTIONS AS SOON AS POSSIBLE AS SHE STARTS PREP IN A FEW HOURS.

## 2024-01-17 ENCOUNTER — HOSPITAL ENCOUNTER (OUTPATIENT)
Facility: HOSPITAL | Age: 49
Setting detail: HOSPITAL OUTPATIENT SURGERY
Discharge: HOME | End: 2024-01-17
Attending: INTERNAL MEDICINE | Admitting: INTERNAL MEDICINE
Payer: COMMERCIAL

## 2024-01-17 ENCOUNTER — ANESTHESIA (OUTPATIENT)
Dept: GASTROENTEROLOGY | Facility: HOSPITAL | Age: 49
End: 2024-01-17
Payer: COMMERCIAL

## 2024-01-17 ENCOUNTER — ANESTHESIA EVENT (OUTPATIENT)
Dept: GASTROENTEROLOGY | Facility: HOSPITAL | Age: 49
End: 2024-01-17
Payer: COMMERCIAL

## 2024-01-17 VITALS
HEIGHT: 67 IN | OXYGEN SATURATION: 100 % | WEIGHT: 289.8 LBS | RESPIRATION RATE: 16 BRPM | DIASTOLIC BLOOD PRESSURE: 76 MMHG | HEART RATE: 79 BPM | SYSTOLIC BLOOD PRESSURE: 125 MMHG | BODY MASS INDEX: 45.48 KG/M2

## 2024-01-17 DIAGNOSIS — Z83.719 FH: COLON POLYPS: ICD-10-CM

## 2024-01-17 LAB
B-HCG UR QL: NEGATIVE
EXPIRATION DATE: NORMAL
INTERNAL NEGATIVE CONTROL: NORMAL
INTERNAL POSITIVE CONTROL: NORMAL
Lab: NORMAL

## 2024-01-17 PROCEDURE — 25010000002 PROPOFOL 10 MG/ML EMULSION: Performed by: NURSE ANESTHETIST, CERTIFIED REGISTERED

## 2024-01-17 PROCEDURE — 88305 TISSUE EXAM BY PATHOLOGIST: CPT | Performed by: INTERNAL MEDICINE

## 2024-01-17 PROCEDURE — 81025 URINE PREGNANCY TEST: CPT | Performed by: INTERNAL MEDICINE

## 2024-01-17 PROCEDURE — S0260 H&P FOR SURGERY: HCPCS | Performed by: INTERNAL MEDICINE

## 2024-01-17 PROCEDURE — 25810000003 LACTATED RINGERS PER 1000 ML: Performed by: INTERNAL MEDICINE

## 2024-01-17 RX ORDER — SODIUM CHLORIDE 0.9 % (FLUSH) 0.9 %
10 SYRINGE (ML) INJECTION AS NEEDED
Status: DISCONTINUED | OUTPATIENT
Start: 2024-01-17 | End: 2024-01-17 | Stop reason: HOSPADM

## 2024-01-17 RX ORDER — SODIUM CHLORIDE, SODIUM LACTATE, POTASSIUM CHLORIDE, CALCIUM CHLORIDE 600; 310; 30; 20 MG/100ML; MG/100ML; MG/100ML; MG/100ML
30 INJECTION, SOLUTION INTRAVENOUS CONTINUOUS PRN
Status: DISCONTINUED | OUTPATIENT
Start: 2024-01-17 | End: 2024-01-17 | Stop reason: HOSPADM

## 2024-01-17 RX ORDER — SODIUM CHLORIDE 9 MG/ML
40 INJECTION, SOLUTION INTRAVENOUS AS NEEDED
Status: DISCONTINUED | OUTPATIENT
Start: 2024-01-17 | End: 2024-01-17 | Stop reason: HOSPADM

## 2024-01-17 RX ORDER — SODIUM CHLORIDE 0.9 % (FLUSH) 0.9 %
10 SYRINGE (ML) INJECTION EVERY 12 HOURS SCHEDULED
Status: DISCONTINUED | OUTPATIENT
Start: 2024-01-17 | End: 2024-01-17 | Stop reason: HOSPADM

## 2024-01-17 RX ORDER — LIDOCAINE HYDROCHLORIDE 20 MG/ML
INJECTION, SOLUTION INFILTRATION; PERINEURAL AS NEEDED
Status: DISCONTINUED | OUTPATIENT
Start: 2024-01-17 | End: 2024-01-17 | Stop reason: SURG

## 2024-01-17 RX ORDER — PROPOFOL 10 MG/ML
VIAL (ML) INTRAVENOUS CONTINUOUS PRN
Status: DISCONTINUED | OUTPATIENT
Start: 2024-01-17 | End: 2024-01-17 | Stop reason: SURG

## 2024-01-17 RX ADMIN — LIDOCAINE HYDROCHLORIDE 60 MG: 20 INJECTION, SOLUTION INFILTRATION; PERINEURAL at 13:54

## 2024-01-17 RX ADMIN — PROPOFOL 250 MCG/KG/MIN: 10 INJECTION, EMULSION INTRAVENOUS at 13:54

## 2024-01-17 RX ADMIN — SODIUM CHLORIDE, POTASSIUM CHLORIDE, SODIUM LACTATE AND CALCIUM CHLORIDE 30 ML/HR: 600; 310; 30; 20 INJECTION, SOLUTION INTRAVENOUS at 13:47

## 2024-01-17 RX ADMIN — PROPOFOL 100 MG: 10 INJECTION, EMULSION INTRAVENOUS at 13:57

## 2024-01-17 NOTE — DISCHARGE INSTRUCTIONS
For the next 24 hours patient needs to be with a responsible adult.    For 24 hours DO NOT drive, operate machinery, appliances, drink alcohol, make important decisions or sign legal documents.    Start with a light or bland diet if you are feeling sick to your stomach otherwise advance to regular diet as tolerated.    Follow recommendations on procedure report if provided by your doctor.    Call Dr Perez for problems [phone]     Problems may include but not limited to: large amounts of bleeding, trouble breathing, repeated vomiting, severe unrelieved pain, fever or chills.

## 2024-01-17 NOTE — ANESTHESIA POSTPROCEDURE EVALUATION
Patient: Radha Torre    Procedure Summary       Date: 01/17/24 [facility] / [facility]  [facility]    Anesthesia Start: 1353 Anesthesia Stop: 1420    Procedure: COLONOSCOPY to cecum withhot snare polypectomies Diagnosis:       FH: colon polyps      (FH: colon polyps [Z83.719])    Surgeons: Remedios Perez MD Provider: Kota Hoffmann MD    Anesthesia Type: MAC ASA Status: 3            Anesthesia Type: MAC    Vitals  Vitals Value Taken Time   /76 01/17/24 1437   Temp     Pulse 79 01/17/24 1437   Resp 16 01/17/24 1437   SpO2 100 % 01/17/24 1437           Post Anesthesia Care and Evaluation    Patient location during evaluation: PACU  Patient participation: complete - patient participated  Level of consciousness: awake and alert  Pain management: adequate    Airway patency: patent  Anesthetic complications: No anesthetic complications    Cardiovascular status: acceptable  Respiratory status: acceptable  Hydration status: acceptable    Comments: --------------------            01/17/24 1437     --------------------   BP:       125/76     Pulse:      79       Resp:       16       SpO2:      100%     --------------------

## 2024-01-17 NOTE — H&P
[facility], [place], operated by [facility]  Pre Procedure History & Physical    Chief Complaint:   Screening-FH polyps    Subjective     HPI:   49 yo here today for first colonoscopy.  Pt reports FH polyps.  Patient denies GI symptoms currently.     Past Medical History:   Past Medical History:   Diagnosis Date    Abnormal ECG     Acid reflux     ON OCC    Anesthesia complication     BP DROPPED DUE TO EPIDURAL FROM C-SECTIONS    Anxiety     Asthma 2017    Occasional reactive asthma/bronchitis after respiratory illness    At risk for sleep apnea     STOP BANG 5    Breast cancer 2022    Right breast ductal carcinoma in situ, ER/NH positive, Ki-67 5%    Depression     Postpartum with 3 births    Deviated septum     Fatigue     Fissure, anal     History of UTI     Hyperlipidemia     Hypertension 2017    noted at last few urgent care visits    Insomnia     Migraine     Obesity     Pain in elbow joint     bilateral    PVC (premature ventricular contraction) 2022    Rectal bleeding     RELATED TO RECTAL FISSUE    Seasonal allergic rhinitis        Past Surgical History:  Past Surgical History:   Procedure Laterality Date    BREAST BIOPSY Right 2022    Tomosynthesis guided Mammotome vacuum assisted right breast 4 o'clock position-Dr. Desmond Zamarripa, [facility]    BREAST LUMPECTOMY Right 2022    Procedure: RIGHT BREAST WIRE LOCALIZED LUMPECTOMY FOR DCIS;  Surgeon: Daisy Romero MD;  Location: [facility];  Service: General;  Laterality: Right;    BREAST SURGERY Bilateral 2022    Procedure: RIGHT BREAST ONCOPLASTIC CLOSURE LEFT BREST REDUCTION FOR SYMMETRY;  Surgeon: Rebecca Bhakta MD;  Location: [facility];  Service: Plastics;  Laterality: Bilateral;     SECTION N/A     x 3    D & C HYSTEROSCOPY ENDOMETRIAL ABLATION N/A 2018    Procedure: DILATATION AND CURETTAGE HYSTEROSCOPY NOVASURE ENDOMETRIAL ABLATION;  Surgeon: Puneet Carreno MD;  Location: [facility];  Service:  Obstetrics/Gynecology    MOHS SURGERY Left     Left Elbow & Left Scalp    REDUCTION MAMMAPLASTY      SKIN LESION EXCISION Right     Right Breast, Benign    TUBAL ABDOMINAL LIGATION Bilateral 2013    after last     WISDOM TOOTH EXTRACTION Bilateral        Family History:  Family History   Problem Relation Age of Onset    Anxiety disorder Mother     Depression Mother     Stroke Mother     Skin cancer Mother         BCE SCE    Colon polyps Mother     Breast cancer Mother 74        DCIS with invasive. DCIS Left Breast 2:30 position ER/ME +, Her2-. Left Breast UOQ Invasive DCIS is multifocal, Two benign sentinel lymph nodes, Pathologic stage: pT1a, N(sn)0.    Arthritis Mother     Cancer Mother         DCIS and invasive    Alcohol abuse Brother     Autism Son     Learning disabilities Son         Autism    Depression Son     Depression Maternal Grandmother     Anxiety disorder Maternal Grandmother     Hyperlipidemia Maternal Grandmother     Hypertension Maternal Grandmother     Heart disease Maternal Grandmother     Arthritis Maternal Grandmother     Stroke Maternal Grandmother     Coronary artery disease Maternal Grandfather     Esophageal cancer Paternal Grandfather 85    Colon cancer Neg Hx     Malig Hyperthermia Neg Hx        Social History:   reports that she quit smoking about 22 years ago. Her smoking use included cigarettes. She started smoking about 28 years ago. She has a 5.00 pack-year smoking history. She has been exposed to tobacco smoke. She has never used smokeless tobacco. She reports current alcohol use of about 2.0 standard drinks of alcohol per week. She reports that she does not use drugs.    Medications:   Medications Prior to Admission   Medication Sig Dispense Refill Last Dose    gabapentin (NEURONTIN) 300 MG capsule Take 2 capsules by mouth 2 (Two) Times a Day.   Past Week    lamoTRIgine (LaMICtal) 100 MG tablet Take 1 tablet by mouth Every Night.   2024    LORazepam (ATIVAN) 1  MG tablet Take 1 tablet by mouth 3 (Three) Times a Day As Needed.   1/16/2024    losartan (COZAAR) 50 MG tablet Take 1 tablet by mouth Daily.   1/16/2024    metoprolol tartrate (LOPRESSOR) 50 MG tablet Take 1 tablet by mouth 2 (Two) Times a Day. 60 tablet 0 1/17/2024    pravastatin (PRAVACHOL) 20 MG tablet Take 1 tablet by mouth Every Night. 90 tablet 1 1/16/2024    tamoxifen (NOLVADEX) 20 MG chemo tablet Take 1 tablet by mouth Daily. 90 tablet 3 1/16/2024    venlafaxine XR (EFFEXOR-XR) 150 MG 24 hr capsule Take 1 capsule by mouth Every Morning.   1/16/2024    venlafaxine XR (EFFEXOR-XR) 75 MG 24 hr capsule Take 1 capsule by mouth Every Morning.   1/16/2024    Vyvanse 40 MG capsule Take 1 capsule by mouth Every Morning   1/16/2024    Semaglutide-Weight Management 0.25 MG/0.5ML solution auto-injector Inject 0.25 mg under the skin into the appropriate area as directed 1 (One) Time Per Week. 2 mL 0        Allergies:  Bupropion and Adhesive tape    ROS:    Pertinent items are noted in HPI     Objective     not currently breastfeeding.    Physical Exam   Constitutional: Pt is oriented to person, place, and time and well-developed, well-nourished, and in no distress.   Mouth/Throat: Oropharynx is clear and moist.   Neck: Normal range of motion.   Cardiovascular: Normal rate, regular rhythm    Pulmonary/Chest: Effort normal    Abdominal: Soft. Nontender  Skin: Skin is warm and dry.   Psychiatric: Mood, memory, affect and judgment normal.     Assessment & Plan     Diagnosis:  Screening for colon cancer-FH polyps    Anticipated Surgical Procedure:  colonoscopy    The risks, benefits, and alternatives of this procedure have been discussed with the patient or the responsible party- the patient understands and agrees to proceed.

## 2024-01-17 NOTE — ANESTHESIA PREPROCEDURE EVALUATION
Anesthesia Evaluation     Patient summary reviewed and Nursing notes reviewed                Airway   Mallampati: II  TM distance: >3 FB  Neck ROM: full  Dental      Pulmonary    (+) a smoker Former, asthma,  Cardiovascular     ECG reviewed  Patient on routine beta blocker  Rhythm: regular  Rate: normal    (+) hypertension, dysrhythmias PVC, hyperlipidemia      Neuro/Psych  (+) headaches, psychiatric history Anxiety and Depression  GI/Hepatic/Renal/Endo    (+) morbid obesity, GERD, GI bleeding     Musculoskeletal (-) negative ROS    Abdominal    Substance History   (+) alcohol use     OB/GYN negative ob/gyn ROS         Other      history of cancer remission                  Anesthesia Plan    ASA 3     MAC     (Has not started ozempic)  intravenous induction     Anesthetic plan, risks, benefits, and alternatives have been provided, discussed and informed consent has been obtained with: patient.    CODE STATUS:

## 2024-01-18 LAB
LAB AP CASE REPORT: NORMAL
PATH REPORT.FINAL DX SPEC: NORMAL
PATH REPORT.GROSS SPEC: NORMAL

## 2024-01-22 ENCOUNTER — PRIOR AUTHORIZATION (OUTPATIENT)
Dept: FAMILY MEDICINE CLINIC | Facility: CLINIC | Age: 49
End: 2024-01-22
Payer: COMMERCIAL

## 2024-01-24 ENCOUNTER — TELEPHONE (OUTPATIENT)
Dept: GASTROENTEROLOGY | Facility: CLINIC | Age: 49
End: 2024-01-24
Payer: COMMERCIAL

## 2024-01-24 NOTE — TELEPHONE ENCOUNTER
Pt reviewed results and Dr Vu' note and recommendations via [employer].     Colonoscopy placed in  and recall for 1/17/27.

## 2024-01-24 NOTE — TELEPHONE ENCOUNTER
----- Message from Remedios Perez MD sent at 1/23/2024  5:01 PM EST -----  The polyp(s) biopsies showed adenomatous change. This is not cancerous but is considered potentially precancerous. Follow-up colonoscopy in 3 years is advised.

## 2024-01-29 RX ORDER — METOPROLOL TARTRATE 50 MG/1
50 TABLET, FILM COATED ORAL 2 TIMES DAILY
Qty: 60 TABLET | Refills: 11 | Status: SHIPPED | OUTPATIENT
Start: 2024-01-29

## 2024-02-07 ENCOUNTER — TELEPHONE (OUTPATIENT)
Dept: ONCOLOGY | Facility: CLINIC | Age: 49
End: 2024-02-07
Payer: COMMERCIAL

## 2024-02-07 NOTE — TELEPHONE ENCOUNTER
Caller: Radha Torre    Relationship to patient: Self    Best call back number: [phone]    Chief complaint: PT IS SICK AND NEEDS TO RESCHEDULE     Type of visit: VITALS AND FOLLOW UP    Requested date: FRIDAY      If rescheduling, when is the original appointment: 2-9-24     Additional notes:GO AHEAD AND SCHEDULE FOR ANY FRIDAY

## 2024-03-01 ENCOUNTER — OFFICE VISIT (OUTPATIENT)
Dept: ONCOLOGY | Facility: CLINIC | Age: 49
End: 2024-03-01
Payer: COMMERCIAL

## 2024-03-01 VITALS
TEMPERATURE: 97.3 F | HEIGHT: 67 IN | BODY MASS INDEX: 45.56 KG/M2 | DIASTOLIC BLOOD PRESSURE: 83 MMHG | OXYGEN SATURATION: 97 % | RESPIRATION RATE: 18 BRPM | SYSTOLIC BLOOD PRESSURE: 131 MMHG | WEIGHT: 290.3 LBS | HEART RATE: 99 BPM

## 2024-03-01 DIAGNOSIS — D05.11 DUCTAL CARCINOMA IN SITU (DCIS) OF RIGHT BREAST: Primary | ICD-10-CM

## 2024-03-01 DIAGNOSIS — Z79.899 HIGH RISK MEDICATION USE: ICD-10-CM

## 2024-03-01 DIAGNOSIS — Z12.31 ENCOUNTER FOR SCREENING MAMMOGRAM FOR MALIGNANT NEOPLASM OF BREAST: ICD-10-CM

## 2024-03-01 RX ORDER — LOSARTAN POTASSIUM 50 MG/1
50 TABLET ORAL DAILY
Qty: 90 TABLET | Refills: 1 | Status: SHIPPED | OUTPATIENT
Start: 2024-03-01

## 2024-03-01 NOTE — PROGRESS NOTES
Subjective   Radha Torre is a 48 y.o. female.  Referred by Sofia Mora for right breast ductal carcinoma in situ    History of Present Illness   Mr. Torre is a 47-year-old premenopausal  lady who presented with a screen detected abnormality of the right breast in March 2022.  She has had previous normal screening mammograms.  This mammogram had been delayed by about 8 months due to COVID-19.    3/7/2022-bilateral screening mammogram  Indeterminate left breast asymmetry.  Further evaluation recommended.  Indeterminate right breast calcifications.  Further evaluation recommended.    4/18/2022-bilateral diagnostic mammogram and ultrasound  The area of focal asymmetry in the middle third of the left breast effaces and consistent with normal breast parenchyma  Right breast-presence of cluster of microcalcifications in the middle third lower inner quadrant.  Right breast finding suspicious for malignancy.  Stereotactic guided biopsy recommended.    5/13/2022-right breast ear tactic biopsy  Pathology consistent with ductal carcinoma in situ  Intermediate grade  ER +85% strong  KY +85% strong    5/20/2022-bilateral breast MRI  There is a 2.1 cm biopsy cavity with two 1 cm focal areas of enhancement along the cavity margins representing the site of biopsy-proven malignancy.  Residual calcifications measure 1.4 cm on postbiopsy mammogram.  No MRI evidence of left breast malignancy.    5/25/2022-she has been evaluated by Dr. Romero and has been recommended to undergo a right breast lumpectomy.  Lumpectomy has not been scheduled yet.  Pending plastics evaluation by Dr. Bhakta.    5/29/2022-Invitae 9 gene stat panel negative.    Family history significant for diagnosis of breast cancer in her mother at the age of 74.  Her mother is a patient of mine, Ms. Marce Bell.   No other family history of breast ovarian pancreatic prostate carcinoma.  No history of melanomas.    Radha denies palpating any new  breast masses, nipple changes, skin changes or nipple discharge.    7/20/2022-right breast lumpectomy and bilateral breast reduction  Ductal carcinoma in situ measuring 10 mm  Grade 2  ER/AL positive  All margins negative    She completed adjuvant radiation to the right breast and started tamoxifen in September 2022.    Interval history  Patient is a 48-year-old female with above-mentioned history who returns today, 3/1/2024 for follow-up continuing on tamoxifen.  She is due this month for her mammogram.  She denies any new palpable breast mass or nodularity.  She does note some intermittent issues with pain and numbness in her right breast after surgery.    She also continues on Effexor and gabapentin to help with hot flashes.    She is complaining of arthralgias in her hands, elbows and knees that seem to be getting worse.  She notes a family history of arthritis..      The following portions of the patient's history were reviewed and updated as appropriate: allergies, current medications, past family history, past medical history, past social history, past surgical history and problem list.    Past Medical History:   Diagnosis Date    Abnormal ECG     Acid reflux     ON OCC    Anesthesia complication     BP DROPPED DUE TO EPIDURAL FROM C-SECTIONS    Anxiety     Asthma 2017    Occasional reactive asthma/bronchitis after respiratory illness    At risk for sleep apnea     STOP BANG 5    Breast cancer 05/13/2022    Right breast ductal carcinoma in situ, ER/AL positive, Ki-67 5%    Depression 2004    Postpartum with 3 births    Deviated septum     Fatigue     Fissure, anal     History of UTI     Hyperlipidemia     Hypertension 2017    noted at last few urgent care visits    Insomnia     Migraine     Obesity     Pain in elbow joint     bilateral    PVC (premature ventricular contraction) 9/8/2022    Rectal bleeding     RELATED TO RECTAL FISSUE    Seasonal allergic rhinitis         Past Surgical History:   Procedure  Laterality Date    BREAST BIOPSY Right 2022    Tomosynthesis guided Mammotome vacuum assisted right breast 4 o'clock position-Dr. Desmond Zamarripa, [facility]    BREAST LUMPECTOMY Right 2022    Procedure: RIGHT BREAST WIRE LOCALIZED LUMPECTOMY FOR DCIS;  Surgeon: Daisy Romero MD;  Location: [facility] OR;  Service: General;  Laterality: Right;    BREAST SURGERY Bilateral 2022    Procedure: RIGHT BREAST ONCOPLASTIC CLOSURE LEFT BREST REDUCTION FOR SYMMETRY;  Surgeon: Rebecca Bhakta MD;  Location: [facility] MAIN OR;  Service: Plastics;  Laterality: Bilateral;     SECTION N/A     x 3    COLONOSCOPY N/A 2024    Procedure: COLONOSCOPY to cecum withhot snare polypectomies;  Surgeon: Remedios Perez MD;  Location: [facility] ENDOSCOPY;  Service: Gastroenterology;  Laterality: N/A;  screening/diverticulosis, hemorrhoids, polyps    D & C HYSTEROSCOPY ENDOMETRIAL ABLATION N/A 2018    Procedure: DILATATION AND CURETTAGE HYSTEROSCOPY NOVASURE ENDOMETRIAL ABLATION;  Surgeon: Puneet Carreno MD;  Location: [facility] OR OSC;  Service: Obstetrics/Gynecology    MOHS SURGERY Left     Left Elbow & Left Scalp    REDUCTION MAMMAPLASTY      SKIN LESION EXCISION Right     Right Breast, Benign    TUBAL ABDOMINAL LIGATION Bilateral 2013    after last     WISDOM TOOTH EXTRACTION Bilateral         Family History   Problem Relation Age of Onset    Anxiety disorder Mother     Depression Mother     Stroke Mother     Skin cancer Mother         BCE SCE    Colon polyps Mother     Breast cancer Mother 74        DCIS with invasive. DCIS Left Breast 2:30 position ER/MO +, Her2-. Left Breast UOQ Invasive DCIS is multifocal, Two benign sentinel lymph nodes, Pathologic stage: pT1a, N(sn)0.    Arthritis Mother     Cancer Mother         DCIS and invasive    Alcohol abuse Brother     Autism Son     Learning disabilities Son         Autism    Depression Son     Depression Maternal Grandmother     Anxiety disorder  Maternal Grandmother     Hyperlipidemia Maternal Grandmother     Hypertension Maternal Grandmother     Heart disease Maternal Grandmother     Arthritis Maternal Grandmother     Stroke Maternal Grandmother     Coronary artery disease Maternal Grandfather     Esophageal cancer Paternal Grandfather 85    Colon cancer Neg Hx     Malig Hyperthermia Neg Hx         Social History     Socioeconomic History    Marital status:      Spouse name: Radu   Tobacco Use    Smoking status: Former     Packs/day: 1.00     Years: 5.00     Additional pack years: 0.00     Total pack years: 5.00     Types: Cigarettes     Start date:      Quit date: 2001     Years since quittin.8     Passive exposure: Past    Smokeless tobacco: Never   Vaping Use    Vaping Use: Never used   Substance and Sexual Activity    Alcohol use: Yes     Alcohol/week: 2.0 standard drinks of alcohol     Types: 2 Drinks containing 0.5 oz of alcohol per week     Comment: Socially; few times a month    Drug use: Never    Sexual activity: Yes     Partners: Male     Birth control/protection: Surgical        OB History          3    Para   3    Term   3            AB        Living   3         SAB        IAB        Ectopic        Molar        Multiple        Live Births   3                 Allergies   Allergen Reactions    Bupropion Palpitations     POSSIBLE    Adhesive Tape Rash     SWELLS. Localized if left on for extended periods of time            Review of Systems   Constitutional: Negative.    HENT: Negative.     Eyes: Negative.    Respiratory: Negative.     Genitourinary: Negative.    Musculoskeletal:  Positive for arthralgias.   Allergic/Immunologic: Negative.    Neurological: Negative.    Psychiatric/Behavioral:  The patient is nervous/anxious.        Review of systems as mentioned in the HPI    Objective   Blood pressure 131/83, pulse 99, temperature 97.3 °F (36.3 °C), temperature source Temporal, resp. rate 18, height 170.2 cm  "(67.01\"), weight 132 kg (290 lb 4.8 oz), SpO2 97%, not currently breastfeeding.   Physical Exam  Vitals reviewed.   Constitutional:       Appearance: She is obese.   HENT:      Head: Normocephalic and atraumatic.      Nose: Nose normal.      Mouth/Throat:      Mouth: Mucous membranes are moist.   Eyes:      Extraocular Movements: Extraocular movements intact.      Pupils: Pupils are equal, round, and reactive to light.   Cardiovascular:      Rate and Rhythm: Normal rate and regular rhythm.      Pulses: Normal pulses.   Pulmonary:      Effort: Pulmonary effort is normal.   Musculoskeletal:         General: Normal range of motion.      Cervical back: Normal range of motion.   Neurological:      General: No focal deficit present.      Mental Status: She is alert and oriented to person, place, and time.   Psychiatric:         Mood and Affect: Mood normal.         Behavior: Behavior normal.         Thought Content: Thought content normal.         Judgment: Judgment normal.       Breast Exam: On inspection there is changes consistent with mastopexy.  There is underlying scar tissue.  There is some tenderness to palpation in the right lower outer quadrant at the site of the scar.  No other new palpable concerns.  Left breast exam is benign as well.    I have reexamined the patient and the results are consistent with the previously documented exam. Radha Merrill, APRN      No visits with results within 30 Day(s) from this visit.   Latest known visit with results is:   Admission on 01/17/2024, Discharged on 01/17/2024   Component Date Value Ref Range Status    HCG, Urine, QL 01/17/2024 Negative  Negative Final    Lot Number 01/17/2024 [id number]   Final    Internal Positive Control 01/17/2024 Passed  Positive, Passed Final    Internal Negative Control 01/17/2024 Passed  Negative, Passed Final    Expiration Date 01/17/2024 4/10/25   Final    Case Report 01/17/2024    Final                    Value:Surgical Pathology Report "                         Case: [id number]                                  Authorizing Provider:  Remedios Perez MD      Collected:           01/17/2024 02:06 PM          Ordering Location:     [facility]  Received:            01/17/2024 03:11 PM                                 ENDO SUITES                                                                  Pathologist:           Sabra Cabrera MD                                                    Specimens:   1) - Large Intestine, Right / Ascending Colon, ascending polyp                                      2) - Large Intestine, Hepatic Flexure, HF polyp                                                     3) - Large Intestine, Left / Descending Colon, descending polyp                            Final Diagnosis 01/17/2024    Final                    Value:This result contains rich text formatting which cannot be displayed here.    Gross Description 01/17/2024    Final                    Value:This result contains rich text formatting which cannot be displayed here.        No radiology results for the last 30 days.   Screening mammogram, bilateral diagnostic mammogram, breast MRI-images independently reviewed and interpreted by me.  Details summarized in the HPI.     8/4/2022-labs reviewed and mild leukocytosis.    Assessment & Plan       *Ductal carcinoma in situ of the right breast  ER +85% strong, PA +85% strong, intermediate grade  Status post right breast lumpectomy and bilateral breast reduction  Pathology showed DCIS measuring 10 mm, margins negative  Tamoxifen started September 2022.  Experiencing hot flashes.  Increase gabapentin if needed  No evidence of recurrent disease  3/15/2023-bilateral screening mammogram benign  Seen in follow-up 3/1/2023 continue on tamoxifen complaining of some arthralgias.  Due for her screening mammogram.  No evidence of recurrence on exam today.    *Anxiety/depression  She is currently on Effexor to help  with anxiety  Also on Ativan  Seeing the couch to help with worsening of anxiety  Supportive oncology referral made  Also on Vyvanse  Mood well controlled on current medications.  She also reports a weight loss of 3 pounds and decreased appetite on Vyvanse.  She feels more motivated to exercise now    *Bilateral elbow pain  Chronic and unchanged  Could be positional   Improved    *Obesity  BMI 43.3  Reports decreased appetite since being started on Vyvanse and reports feeling better from standpoint.    Reports improved motivation to exercise  Also eating healthier.  Encouraged her efforts to lose weight.    PLAN:  Continue tamoxifen 20 mg daily.  Schedule patient for screening mammogram to be done after 3/16/2024.  Follow-up in 6 months with Dr. Sharma with repeat CBC and CMP.  Call/ return sooner should the patient develop any new concerns or problems.      Patient is on a high risk medication requiring close monitoring for toxicity.

## 2024-03-06 ENCOUNTER — OFFICE VISIT (OUTPATIENT)
Dept: OBSTETRICS AND GYNECOLOGY | Age: 49
End: 2024-03-06
Payer: COMMERCIAL

## 2024-03-06 VITALS
HEIGHT: 67 IN | WEIGHT: 293 LBS | DIASTOLIC BLOOD PRESSURE: 76 MMHG | SYSTOLIC BLOOD PRESSURE: 126 MMHG | BODY MASS INDEX: 45.99 KG/M2

## 2024-03-06 DIAGNOSIS — D50.0 IRON DEFICIENCY ANEMIA DUE TO CHRONIC BLOOD LOSS: ICD-10-CM

## 2024-03-06 DIAGNOSIS — D05.11 DUCTAL CARCINOMA IN SITU (DCIS) OF RIGHT BREAST: ICD-10-CM

## 2024-03-06 DIAGNOSIS — Z01.419 ENCOUNTER FOR GYNECOLOGICAL EXAMINATION: ICD-10-CM

## 2024-03-06 DIAGNOSIS — D25.1 INTRAMURAL LEIOMYOMA OF UTERUS: ICD-10-CM

## 2024-03-06 DIAGNOSIS — C50.919 MALIGNANT NEOPLASM OF FEMALE BREAST, UNSPECIFIED ESTROGEN RECEPTOR STATUS, UNSPECIFIED LATERALITY, UNSPECIFIED SITE OF BREAST: Primary | ICD-10-CM

## 2024-03-06 DIAGNOSIS — Z12.4 SCREENING FOR CERVICAL CANCER: ICD-10-CM

## 2024-03-06 DIAGNOSIS — Z12.31 BREAST CANCER SCREENING BY MAMMOGRAM: ICD-10-CM

## 2024-03-06 RX ORDER — LAMOTRIGINE 150 MG/1
150 TABLET ORAL
COMMUNITY
Start: 2024-03-05

## 2024-03-06 NOTE — PROGRESS NOTES
Subjective     Chief Complaint   Patient presents with    Gynecologic Exam     Annual and Gyn u/s: Started bleeding yesterday,none since , last pap ,mammo 3/23,appt.3/17/24,colonoscopy ,pre-cancerous polyps         History of Present Illness    Wellness exam  Radha Torre is a very pleasant  48 y.o. female who presents for annual exam.  Mammo Exam scheduled, Contraception yes, Exercise encouraged  Patient is still seeing hematology oncology for her breast cancer.  She is known to have uterine fibroid about 2 cm.  She thought she might be menopausal as she went almost a year without cycle and then had a heavy flow.  Fortunately today's ultrasound showed a normal endometrial stripe no evidence of hyperplasia.    She is up-to-date on her wellness labs.  She had a colonoscopy in January of this year with a polyp and will have to be rescanned studied in less than 3 years    Her 3 children are doing well.      Obstetric History:  OB History          3    Para   3    Term   3            AB        Living   3         SAB        IAB        Ectopic        Molar        Multiple        Live Births   3               Menstrual History:     Patient's last menstrual period was 2024 (exact date).       Sexual History:       Past Medical History:   Diagnosis Date    Abnormal ECG     Acid reflux     ON OCC    Anesthesia complication     BP DROPPED DUE TO EPIDURAL FROM C-SECTIONS    Anxiety     Asthma 2017    Occasional reactive asthma/bronchitis after respiratory illness    At risk for sleep apnea     STOP BANG 5    Breast cancer 2022    Right breast ductal carcinoma in situ, ER/DC positive, Ki-67 5%    Depression     Postpartum with 3 births    Deviated septum     Fatigue     Fissure, anal     History of UTI     Hyperlipidemia     Hypertension 2017    noted at last few urgent care visits    Insomnia     Migraine     Obesity     Pain in elbow joint     bilateral    PVC (premature  ventricular contraction) 2022    Rectal bleeding     RELATED TO RECTAL FISSUE    Seasonal allergic rhinitis      Past Surgical History:   Procedure Laterality Date    BREAST BIOPSY Right 2022    Tomosynthesis guided Mammotome vacuum assisted right breast 4 o'clock position-Dr. Desmond Zamarripa, [facility]    BREAST LUMPECTOMY Right 2022    Procedure: RIGHT BREAST WIRE LOCALIZED LUMPECTOMY FOR DCIS;  Surgeon: Daisy Romero MD;  Location: [facility] OR;  Service: General;  Laterality: Right;    BREAST SURGERY Bilateral 2022    Procedure: RIGHT BREAST ONCOPLASTIC CLOSURE LEFT BREST REDUCTION FOR SYMMETRY;  Surgeon: Rebecca Bhakta MD;  Location: [facility] MAIN OR;  Service: Plastics;  Laterality: Bilateral;     SECTION N/A     x 3    COLONOSCOPY N/A 2024    Procedure: COLONOSCOPY to cecum withhot snare polypectomies;  Surgeon: Remedios Perez MD;  Location: [facility] ENDOSCOPY;  Service: Gastroenterology;  Laterality: N/A;  screening/diverticulosis, hemorrhoids, polyps    D & C HYSTEROSCOPY ENDOMETRIAL ABLATION N/A 2018    Procedure: DILATATION AND CURETTAGE HYSTEROSCOPY NOVASURE ENDOMETRIAL ABLATION;  Surgeon: Puneet Carreno MD;  Location: [facility] OR OSC;  Service: Obstetrics/Gynecology    MOHS SURGERY Left     Left Elbow & Left Scalp    REDUCTION MAMMAPLASTY      SKIN LESION EXCISION Right     Right Breast, Benign    TUBAL ABDOMINAL LIGATION Bilateral 2013    after last     WISDOM TOOTH EXTRACTION Bilateral        SOCIAL Hx:      The following portions of the patient's history were reviewed and updated as appropriate: allergies, current medications, past family history, past medical history, past social history, past surgical history and problem list.    Review of Systems        Except as outlined in history of physical illness, patient denies any changes in her GYN, , GI systems.  All other systems reviewed were negative.         Current Outpatient Medications:     " lamoTRIgine (LaMICtal) 150 MG tablet, 1 tablet., Disp: , Rfl:     LORazepam (ATIVAN) 1 MG tablet, Take 1 tablet by mouth 3 (Three) Times a Day As Needed., Disp: , Rfl:     losartan (COZAAR) 50 MG tablet, Take 1 tablet by mouth Daily., Disp: 90 tablet, Rfl: 1    metoprolol tartrate (LOPRESSOR) 50 MG tablet, TAKE 1 TABLET BY MOUTH TWICE A DAY, Disp: 60 tablet, Rfl: 11    pravastatin (PRAVACHOL) 20 MG tablet, Take 1 tablet by mouth Every Night., Disp: 90 tablet, Rfl: 1    tamoxifen (NOLVADEX) 20 MG chemo tablet, Take 1 tablet by mouth Daily., Disp: 90 tablet, Rfl: 3    venlafaxine XR (EFFEXOR-XR) 150 MG 24 hr capsule, Take 1 capsule by mouth Every Morning., Disp: , Rfl:     venlafaxine XR (EFFEXOR-XR) 75 MG 24 hr capsule, Take 1 capsule by mouth Every Morning., Disp: , Rfl:     Vyvanse 40 MG capsule, Take 1 capsule by mouth Every Morning, Disp: , Rfl:     gabapentin (NEURONTIN) 300 MG capsule, Take 2 capsules by mouth 2 (Two) Times a Day. (Patient not taking: Reported on 3/6/2024), Disp: , Rfl:     lamoTRIgine (LaMICtal) 100 MG tablet, Take 1 tablet by mouth Every Night. (Patient not taking: Reported on 3/6/2024), Disp: , Rfl:     Semaglutide-Weight Management 0.25 MG/0.5ML solution auto-injector, Inject 0.25 mg under the skin into the appropriate area as directed 1 (One) Time Per Week. (Patient not taking: Reported on 3/6/2024), Disp: 2 mL, Rfl: 0   Objective   Physical Exam    /76   Ht 170.2 cm (67\")   Wt 133 kg (293 lb)   LMP 03/05/2024 (Exact Date)   BMI 45.89 kg/m²     General: Patient is alert and oriented and appears overall healthy  Neck: Is supple without thyromegaly, no carotid bruits and no lymphadenopathy  Lungs: Clear bilaterally, no wheezing, rhonchi, or rales.  Respiratory rate is normal  Breast: Even, symmetrical, no lymphadenopathy, no retraction, no discharge, no masses or cysts scars are well-healed  Heart: Regular rate and rhythm are appreciated, no murmurs or rubs are heard  Abdomen: Is " soft, without organomegaly, bowel sounds are positive, there is no rebound or guarding and palpation does not produce any discomfort  Back: Nontender without CVA tenderness  Pelvic: External genitalia appear normal and consistent with mature female.  BUS normal                            Vagina is clean dry without discharge and appears adequately estrogenized, no lesions or masses are present                         Cervix is noninflamed without discharge or lesions.  There is no cervical motion tenderness.                Uterus is nonenlarged, without tenderness, and no masses or abnormalities are  present, fibroid is nonpalpable               Adnexa are non-enlarged, non tender               Rectal exam reveals adequate sphincter tone and no masses or lesions are appreciated on digital rectal examination.      Annual Well Woman Exam  Patient Active Problem List   Diagnosis    Essential hypertension    Iron deficiency anemia due to chronic blood loss    Menorrhagia with regular cycle    Anxiety    Breast cancer    Ductal carcinoma in situ (DCIS) of right breast    PVC (premature ventricular contraction)    Intramural leiomyoma of uterus    FH: colon polyps    Class 3 severe obesity with body mass index (BMI) of 45.0 to 49.9 in adult                 Assessment & Plan   Diagnoses and all orders for this visit:    1. Malignant neoplasm of female breast, unspecified estrogen receptor status, unspecified laterality, unspecified site of breast (Primary)  -     IGP, Apt HPV,rfx 16 / 18,45    2. Iron deficiency anemia due to chronic blood loss  -     IGP, Apt HPV,rfx 16 / 18,45    3. Breast cancer screening by mammogram  -     IGP, Apt HPV,rfx 16 / 18,45  -     Mammo Screening Digital Tomosynthesis Bilateral With CAD; Future    4. Screening for cervical cancer  -     IGP, Apt HPV,rfx 16 / 18,45    5. Ductal carcinoma in situ (DCIS) of right breast  -     IGP, Apt HPV,rfx 16 / 18,45    6. Intramural leiomyoma of uterus  -      IGP, Apt HPV,rfx 16 / 18,45    7. Encounter for gynecological examination  -     IGP, Apt HPV,rfx 16 / 18,45    As outlined above, patient is perimenopausal thought she might have been through her but had a period recently.  Today's ultrasound is reassuring essentially unchanged with a thin endometrial stripe  We will continue observation talked about options of cycling with progesterone if okayed with oncology to regulate cycles if she wants to but declines that currently.  She is advised to call us with any change in her condition or bleeding patterns.  Discussed today's findings and concerns with patient.  Continue to recommend regular exercise including cardiovascular and resistance training as well as  breast self-exam. Wellness lab, mammography, & pap smear, in accordance with age guidelines.    I have encouraged her to call for today's test results if she has not received them within 10 days.  Patient is advised to call with any change in her condition or with any other questions, otherwise return  for annual examination.

## 2024-03-10 LAB
CYTOLOGIST CVX/VAG CYTO: NORMAL
CYTOLOGY CVX/VAG DOC CYTO: NORMAL
CYTOLOGY CVX/VAG DOC THIN PREP: NORMAL
DX ICD CODE: NORMAL
HPV I/H RISK 4 DNA CVX QL PROBE+SIG AMP: NEGATIVE
Lab: NORMAL
OTHER STN SPEC: NORMAL
STAT OF ADQ CVX/VAG CYTO-IMP: NORMAL

## 2024-03-16 ENCOUNTER — HOSPITAL ENCOUNTER (OUTPATIENT)
Dept: MAMMOGRAPHY | Facility: HOSPITAL | Age: 49
Discharge: HOME | End: 2024-03-16
Admitting: INTERNAL MEDICINE
Payer: COMMERCIAL

## 2024-03-16 DIAGNOSIS — D05.11 DUCTAL CARCINOMA IN SITU (DCIS) OF RIGHT BREAST: ICD-10-CM

## 2024-03-16 DIAGNOSIS — Z79.899 HIGH RISK MEDICATION USE: ICD-10-CM

## 2024-03-16 DIAGNOSIS — Z12.31 ENCOUNTER FOR SCREENING MAMMOGRAM FOR MALIGNANT NEOPLASM OF BREAST: ICD-10-CM

## 2024-03-16 PROCEDURE — 77063 BREAST TOMOSYNTHESIS BI: CPT

## 2024-03-16 PROCEDURE — 77067 SCR MAMMO BI INCL CAD: CPT

## 2024-04-02 ENCOUNTER — PRIOR AUTHORIZATION (OUTPATIENT)
Dept: FAMILY MEDICINE CLINIC | Facility: CLINIC | Age: 49
End: 2024-04-02
Payer: COMMERCIAL

## 2024-04-02 RX ORDER — TAMOXIFEN CITRATE 20 MG/1
20 TABLET ORAL DAILY
Qty: 90 TABLET | Refills: 3 | Status: SHIPPED | OUTPATIENT
Start: 2024-04-02

## 2024-06-12 DIAGNOSIS — M25.50 ARTHRALGIA, UNSPECIFIED JOINT: Primary | ICD-10-CM

## 2024-07-11 RX ORDER — PRAVASTATIN SODIUM 20 MG
20 TABLET ORAL NIGHTLY
Qty: 90 TABLET | Refills: 3 | Status: SHIPPED | OUTPATIENT
Start: 2024-07-11

## 2024-07-11 NOTE — TELEPHONE ENCOUNTER
Rx Refill Note  Requested Prescriptions     Pending Prescriptions Disp Refills    pravastatin (PRAVACHOL) 20 MG tablet [Pharmacy Med Name: PRAVASTATIN SODIUM 20 MG TAB] 30 tablet      Sig: TAKE ONE TABLET BY MOUTH ONCE NIGHTLY      Last office visit with prescribing clinician: 1/9/2024   Last telemedicine visit with prescribing clinician: Visit date not found   Next office visit with prescribing clinician: Visit date not found                         Would you like a call back once the refill request has been completed: [] Yes [] No    If the office needs to give you a call back, can they leave a voicemail: [] Yes [] No    Bisi Jeronimo MA  07/11/24, 07:21 EDT

## 2024-07-12 ENCOUNTER — OFFICE VISIT (OUTPATIENT)
Dept: FAMILY MEDICINE CLINIC | Facility: CLINIC | Age: 49
End: 2024-07-12
Payer: COMMERCIAL

## 2024-07-12 VITALS
SYSTOLIC BLOOD PRESSURE: 130 MMHG | WEIGHT: 293 LBS | DIASTOLIC BLOOD PRESSURE: 84 MMHG | HEART RATE: 114 BPM | OXYGEN SATURATION: 98 % | TEMPERATURE: 98 F | BODY MASS INDEX: 45.99 KG/M2 | HEIGHT: 67 IN

## 2024-07-12 DIAGNOSIS — M25.50 ARTHRALGIA, UNSPECIFIED JOINT: Primary | ICD-10-CM

## 2024-07-12 DIAGNOSIS — Z13.0 SCREENING FOR DEFICIENCY ANEMIA: ICD-10-CM

## 2024-07-12 DIAGNOSIS — Z13.6 SCREENING FOR ISCHEMIC HEART DISEASE: ICD-10-CM

## 2024-07-12 PROCEDURE — 99214 OFFICE O/P EST MOD 30 MIN: CPT | Performed by: STUDENT IN AN ORGANIZED HEALTH CARE EDUCATION/TRAINING PROGRAM

## 2024-07-12 RX ORDER — NAPROXEN 500 MG/1
500 TABLET ORAL 2 TIMES DAILY WITH MEALS
Qty: 20 TABLET | Refills: 0 | Status: SHIPPED | OUTPATIENT
Start: 2024-07-12

## 2024-07-12 RX ORDER — GABAPENTIN 800 MG/1
800 TABLET ORAL NIGHTLY
COMMUNITY
Start: 2024-06-13

## 2024-07-12 NOTE — PROGRESS NOTES
"Chief Complaint  Joint Pain (Generalized joint pain - most prominently bilateral elbows, knees, and fingers - Pt has always attributed to Tamoxifen, but concerned for tick bite in May. Will establish w Sayra Rodriguez in August. 0/10 at present, 4/10 at worst ) and Earache (L ear pain x1W Pt reports mild clear discharge and possible hearing loss. 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    She has experienced a lot of of depression related to her diagnosis of breast cancer and some marital problems.  She has noticed a lot of weight gain with this.  [facility] who is currently making medication adjustments with regard to her lorazepam and gabapentin.  She was recently started on Lamictal.  She is also taking Effexor.    She is on tamoxifen and experienced a lot of hot flashes which have improved since being on Effexor and gabapentin.  She has joint pain from tamoxifen.  She is concerned that there may be some other underlying causes of joint pain other than tamoxifen such as autoimmune disorder.  She has never been worked up for an autoimmune disorder.      Objective   Vital Signs:  /84   Pulse 114   Temp 98 °F (36.7 °C)   Ht 170.2 cm (67.01\")   Wt 135 kg (298 lb)   SpO2 98%   BMI 46.66 kg/m²   Estimated body mass index is 46.66 kg/m² as calculated from the following:    Height as of this encounter: 170.2 cm (67.01\").    Weight as of this encounter: 135 kg (298 lb).               Physical Exam  Constitutional:       General: She is not in acute distress.  Eyes:      Conjunctiva/sclera: Conjunctivae normal.   Cardiovascular:      Rate and Rhythm: Normal rate and regular rhythm.   Pulmonary:      Effort: Pulmonary effort is normal. No respiratory distress.      Breath " sounds: Normal breath sounds.   Abdominal:      Palpations: Abdomen is soft.      Tenderness: There is no abdominal tenderness.   Skin:     General: Skin is warm and dry.   Neurological:      Mental Status: She is alert and oriented to person, place, and time.   Psychiatric:         Mood and Affect: Mood normal.         Behavior: Behavior normal.        Result Review :    The following data was reviewed by: Abigail Pennington MD on 07/12/2024:  Common labs          8/21/2023    05:30 7/12/2024    14:31   Common Labs   Glucose 109  84    BUN 11  8    Creatinine 0.65  0.63    Sodium 136  143    Potassium 3.7  5.1    Chloride 101  103    Calcium 8.4  9.6    Total Protein  7.3    Albumin 4.2  4.4    Total Bilirubin 0.2  <0.2    Alkaline Phosphatase 93  105    AST (SGOT) 27  82    ALT (SGPT) 28  90    WBC 9.57  6.01    Hemoglobin 12.7  12.9    Hematocrit 39.9  39.4    Platelets 327  314    Hemoglobin A1C  5.70      Data reviewed : none             Assessment and Plan     Diagnoses and all orders for this visit:    1. Arthralgia, unspecified joint (Primary)  -     Comprehensive Metabolic Panel  -     CBC Auto Differential  -     ORDER: Hemoglobin A1c  -     TSH  -     JALEN by IFA, Reflex to Titer and Pattern  -     C-reactive protein  -     Sedimentation rate, automated  -     Tick Panel  -     Lyme Disease Total Antibody With Reflex to Immunoassay  -     Ehrlichia Profile DNA PCR  -     Rickettsia Species DNA, Real-Time PCR    2. Screening for ischemic heart disease  -     Comprehensive Metabolic Panel  -     TSH    3. Screening for deficiency anemia  -     CBC Auto Differential    Other orders  -     naproxen (Naprosyn) 500 MG tablet; Take 1 tablet by mouth 2 (Two) Times a Day With Meals.  Dispense: 20 tablet; Refill: 0  -     Specimen Status Report  -     CBC & Differential  -     LIZ Staining Patterns    Follow think the most likely explanation of her arthralgias is her tamoxifen, we will rule out other etiologies as  above.  Encouraged her to talk with Dr. Sharma about alternatives to tamoxifen if she is having this severe of arthralgias.  Encouraged her to use Naprosyn as needed to hopefully mitigate any type of inflammatory process of joint pain.  I would also like to consider pravastatin as a potential cause of her arthralgias and this is something that we could also look at decreasing or changing if lab work is normal.  I encouraged her to continue regular physical activity and weight loss which will further help prevent any further joint pain related to osteoarthritis.         Follow Up     Return in about 3 months (around 10/12/2024).  Patient was given instructions and counseling regarding her condition or for health maintenance advice. Please see specific information pulled into the AVS if appropriate.

## 2024-07-13 LAB
A PHAGOCYTOPH DNA BLD QL NAA+PROBE: NORMAL
B BURGDOR IGG+IGM SER QL IA: NEGATIVE
E CHAFFEENSIS DNA BLD QL NAA+PROBE: NORMAL
RICKETTSIA RICKETTSII DNA, RT: NORMAL
SPECIMEN STATUS: NORMAL

## 2024-07-15 LAB
ALBUMIN SERPL-MCNC: 4.4 G/DL (ref 3.5–5.2)
ALBUMIN/GLOB SERPL: 1.5 G/DL
ALP SERPL-CCNC: 105 U/L (ref 39–117)
ALT SERPL-CCNC: 90 U/L (ref 1–33)
ANA HOMOGEN TITR SER: ABNORMAL {TITER}
ANA SER QL IF: POSITIVE
AST SERPL-CCNC: 82 U/L (ref 1–32)
BASOPHILS # BLD AUTO: 0.02 10*3/MM3 (ref 0–0.2)
BASOPHILS NFR BLD AUTO: 0.3 % (ref 0–1.5)
BILIRUB SERPL-MCNC: <0.2 MG/DL (ref 0–1.2)
BUN SERPL-MCNC: 8 MG/DL (ref 6–20)
BUN/CREAT SERPL: 12.7 (ref 7–25)
CALCIUM SERPL-MCNC: 9.6 MG/DL (ref 8.6–10.5)
CHLORIDE SERPL-SCNC: 103 MMOL/L (ref 98–107)
CO2 SERPL-SCNC: 29.6 MMOL/L (ref 22–29)
CREAT SERPL-MCNC: 0.63 MG/DL (ref 0.57–1)
CRP SERPL-MCNC: 0.68 MG/DL (ref 0–0.5)
EGFRCR SERPLBLD CKD-EPI 2021: 109.6 ML/MIN/1.73
EOSINOPHIL # BLD AUTO: 0.13 10*3/MM3 (ref 0–0.4)
EOSINOPHIL NFR BLD AUTO: 2.2 % (ref 0.3–6.2)
ERYTHROCYTE [DISTWIDTH] IN BLOOD BY AUTOMATED COUNT: 12.5 % (ref 12.3–15.4)
ERYTHROCYTE [SEDIMENTATION RATE] IN BLOOD BY WESTERGREN METHOD: 6 MM/HR (ref 0–20)
GLOBULIN SER CALC-MCNC: 2.9 GM/DL
GLUCOSE SERPL-MCNC: 84 MG/DL (ref 65–99)
HBA1C MFR BLD: 5.7 % (ref 4.8–5.6)
HCT VFR BLD AUTO: 39.4 % (ref 34–46.6)
HGB BLD-MCNC: 12.9 G/DL (ref 12–15.9)
IMM GRANULOCYTES # BLD AUTO: 0.02 10*3/MM3 (ref 0–0.05)
IMM GRANULOCYTES NFR BLD AUTO: 0.3 % (ref 0–0.5)
LYMPHOCYTES # BLD AUTO: 1.61 10*3/MM3 (ref 0.7–3.1)
LYMPHOCYTES NFR BLD AUTO: 26.8 % (ref 19.6–45.3)
Lab: ABNORMAL
MCH RBC QN AUTO: 28.7 PG (ref 26.6–33)
MCHC RBC AUTO-ENTMCNC: 32.7 G/DL (ref 31.5–35.7)
MCV RBC AUTO: 87.8 FL (ref 79–97)
MITOTIC SPINDLE APP AB TITR SERPL IF: ABNORMAL {TITER}
MONOCYTES # BLD AUTO: 0.64 10*3/MM3 (ref 0.1–0.9)
MONOCYTES NFR BLD AUTO: 10.6 % (ref 5–12)
NEUTROPHILS # BLD AUTO: 3.59 10*3/MM3 (ref 1.7–7)
NEUTROPHILS NFR BLD AUTO: 59.8 % (ref 42.7–76)
NRBC BLD AUTO-RTO: 0 /100 WBC (ref 0–0.2)
PLATELET # BLD AUTO: 314 10*3/MM3 (ref 140–450)
POTASSIUM SERPL-SCNC: 5.1 MMOL/L (ref 3.5–5.2)
PROT SERPL-MCNC: 7.3 G/DL (ref 6–8.5)
RBC # BLD AUTO: 4.49 10*6/MM3 (ref 3.77–5.28)
SODIUM SERPL-SCNC: 143 MMOL/L (ref 136–145)
TSH SERPL DL<=0.005 MIU/L-ACNC: 1.42 UIU/ML (ref 0.27–4.2)
WBC # BLD AUTO: 6.01 10*3/MM3 (ref 3.4–10.8)

## 2024-07-17 DIAGNOSIS — R76.8 POSITIVE ANA (ANTINUCLEAR ANTIBODY): Primary | ICD-10-CM

## 2024-07-17 DIAGNOSIS — M25.50 ARTHRALGIA, UNSPECIFIED JOINT: ICD-10-CM

## 2024-07-18 DIAGNOSIS — M25.50 ARTHRALGIA, UNSPECIFIED JOINT: ICD-10-CM

## 2024-07-18 DIAGNOSIS — R76.8 POSITIVE ANA (ANTINUCLEAR ANTIBODY): ICD-10-CM

## 2024-07-21 LAB
ALBUMIN SERPL-MCNC: 4.3 G/DL (ref 3.9–4.9)
ALP SERPL-CCNC: 100 IU/L (ref 44–121)
ALT SERPL-CCNC: 85 IU/L (ref 0–32)
ANA SER QL IF: POSITIVE
ANA SPECKLED TITR SER: ABNORMAL {TITER}
AST SERPL-CCNC: 103 IU/L (ref 0–40)
BILIRUB SERPL-MCNC: 0.3 MG/DL (ref 0–1.2)
BUN SERPL-MCNC: 15 MG/DL (ref 6–24)
BUN/CREAT SERPL: 20 (ref 9–23)
CALCIUM SERPL-MCNC: 9.4 MG/DL (ref 8.7–10.2)
CCP IGA+IGG SERPL IA-ACNC: 5 UNITS (ref 0–19)
CENTROMERE B AB SER-ACNC: <0.2 AI (ref 0–0.9)
CHLORIDE SERPL-SCNC: 99 MMOL/L (ref 96–106)
CO2 SERPL-SCNC: 26 MMOL/L (ref 20–29)
CREAT SERPL-MCNC: 0.76 MG/DL (ref 0.57–1)
DSDNA AB SER-ACNC: <1 IU/ML (ref 0–9)
EGFRCR SERPLBLD CKD-EPI 2021: 97 ML/MIN/1.73
ENA SCL70 AB SER-ACNC: <0.2 AI (ref 0–0.9)
ENA SM AB SER-ACNC: <0.2 AI (ref 0–0.9)
ENA SS-A AB SER-ACNC: 2.3 AI (ref 0–0.9)
ENA SS-B AB SER-ACNC: <0.2 AI (ref 0–0.9)
GLOBULIN SER CALC-MCNC: 2.9 G/DL (ref 1.5–4.5)
GLUCOSE SERPL-MCNC: 99 MG/DL (ref 70–99)
Lab: ABNORMAL
MITOTIC SPINDLE APP AB TITR SERPL IF: ABNORMAL {TITER}
POTASSIUM SERPL-SCNC: 4.6 MMOL/L (ref 3.5–5.2)
PROT SERPL-MCNC: 7.2 G/DL (ref 6–8.5)
SMA IGG SER-ACNC: 8 UNITS (ref 0–19)
SODIUM SERPL-SCNC: 139 MMOL/L (ref 134–144)

## 2024-07-31 ENCOUNTER — TELEPHONE (OUTPATIENT)
Dept: ONCOLOGY | Facility: CLINIC | Age: 49
End: 2024-07-31

## 2024-07-31 NOTE — TELEPHONE ENCOUNTER
Caller: Radha Torre    Relationship to patient: Self    Best call back number: [phone]    Chief complaint:     Type of visit: LAB & F/U 1     Requested date: CALL TO R/S     If rescheduling, when is the original appointment: 8/8/2024    Additional notes:

## 2024-08-12 DIAGNOSIS — M25.50 ARTHRALGIA, UNSPECIFIED JOINT: Primary | ICD-10-CM

## 2024-08-17 LAB
A PHAGOCYTOPH DNA BLD QL NAA+PROBE: NEGATIVE
E CHAFFEENSIS DNA BLD QL NAA+PROBE: NEGATIVE
RICKETTSIA RICKETTSII DNA, RT: NOT DETECTED

## 2024-08-22 ENCOUNTER — OFFICE VISIT (OUTPATIENT)
Dept: ONCOLOGY | Facility: CLINIC | Age: 49
End: 2024-08-22
Payer: COMMERCIAL

## 2024-08-22 ENCOUNTER — LAB (OUTPATIENT)
Dept: OTHER | Facility: HOSPITAL | Age: 49
End: 2024-08-22
Payer: COMMERCIAL

## 2024-08-22 ENCOUNTER — APPOINTMENT (OUTPATIENT)
Dept: OTHER | Facility: HOSPITAL | Age: 49
End: 2024-08-22
Payer: COMMERCIAL

## 2024-08-22 VITALS
DIASTOLIC BLOOD PRESSURE: 83 MMHG | OXYGEN SATURATION: 98 % | WEIGHT: 293 LBS | BODY MASS INDEX: 45.99 KG/M2 | HEIGHT: 67 IN | HEART RATE: 99 BPM | SYSTOLIC BLOOD PRESSURE: 124 MMHG | RESPIRATION RATE: 18 BRPM | TEMPERATURE: 98.3 F

## 2024-08-22 DIAGNOSIS — Z12.31 SCREENING MAMMOGRAM FOR BREAST CANCER: ICD-10-CM

## 2024-08-22 DIAGNOSIS — R74.8 ELEVATED LIVER ENZYMES: ICD-10-CM

## 2024-08-22 DIAGNOSIS — D05.11 DUCTAL CARCINOMA IN SITU (DCIS) OF RIGHT BREAST: ICD-10-CM

## 2024-08-22 LAB
ALBUMIN SERPL-MCNC: 4.2 G/DL (ref 3.5–5.2)
ALBUMIN/GLOB SERPL: 1.3 G/DL
ALP SERPL-CCNC: 101 U/L (ref 39–117)
ALT SERPL W P-5'-P-CCNC: 100 U/L (ref 1–33)
ANION GAP SERPL CALCULATED.3IONS-SCNC: 8.6 MMOL/L (ref 5–15)
AST SERPL-CCNC: 123 U/L (ref 1–32)
BILIRUB SERPL-MCNC: 0.2 MG/DL (ref 0–1.2)
BUN SERPL-MCNC: 13 MG/DL (ref 6–20)
BUN/CREAT SERPL: 17.3 (ref 7–25)
CALCIUM SPEC-SCNC: 9.4 MG/DL (ref 8.6–10.5)
CHLORIDE SERPL-SCNC: 101 MMOL/L (ref 98–107)
CO2 SERPL-SCNC: 30.4 MMOL/L (ref 22–29)
CREAT SERPL-MCNC: 0.75 MG/DL (ref 0.57–1)
EGFRCR SERPLBLD CKD-EPI 2021: 98.3 ML/MIN/1.73
GLOBULIN UR ELPH-MCNC: 3.3 GM/DL
GLUCOSE SERPL-MCNC: 97 MG/DL (ref 65–99)
POTASSIUM SERPL-SCNC: 4.7 MMOL/L (ref 3.5–5.2)
PROT SERPL-MCNC: 7.5 G/DL (ref 6–8.5)
SODIUM SERPL-SCNC: 140 MMOL/L (ref 136–145)

## 2024-08-22 PROCEDURE — 36415 COLL VENOUS BLD VENIPUNCTURE: CPT

## 2024-08-22 PROCEDURE — 80053 COMPREHEN METABOLIC PANEL: CPT | Performed by: INTERNAL MEDICINE

## 2024-08-22 PROCEDURE — 99214 OFFICE O/P EST MOD 30 MIN: CPT | Performed by: INTERNAL MEDICINE

## 2024-08-22 NOTE — PROGRESS NOTES
Subjective   Radha Torre is a 49 y.o. female.  Referred by Sofia Mora for right breast ductal carcinoma in situ    History of Present Illness   Mr. Torre is a 47-year-old premenopausal  lady who presented with a screen detected abnormality of the right breast in March 2022.  She has had previous normal screening mammograms.  This mammogram had been delayed by about 8 months due to COVID-19.    3/7/2022-bilateral screening mammogram  Indeterminate left breast asymmetry.  Further evaluation recommended.  Indeterminate right breast calcifications.  Further evaluation recommended.    4/18/2022-bilateral diagnostic mammogram and ultrasound  The area of focal asymmetry in the middle third of the left breast effaces and consistent with normal breast parenchyma  Right breast-presence of cluster of microcalcifications in the middle third lower inner quadrant.  Right breast finding suspicious for malignancy.  Stereotactic guided biopsy recommended.    5/13/2022-right breast ear tactic biopsy  Pathology consistent with ductal carcinoma in situ  Intermediate grade  ER +85% strong  WY +85% strong    5/20/2022-bilateral breast MRI  There is a 2.1 cm biopsy cavity with two 1 cm focal areas of enhancement along the cavity margins representing the site of biopsy-proven malignancy.  Residual calcifications measure 1.4 cm on postbiopsy mammogram.  No MRI evidence of left breast malignancy.    5/25/2022-she has been evaluated by Dr. Romero and has been recommended to undergo a right breast lumpectomy.  Lumpectomy has not been scheduled yet.  Pending plastics evaluation by Dr. Bhakta.    5/29/2022-Invitae 9 gene stat panel negative.    Family history significant for diagnosis of breast cancer in her mother at the age of 74.  Her mother is a patient of mine, Ms. Marce Bell.   No other family history of breast ovarian pancreatic prostate carcinoma.  No history of melanomas.    Radha denies palpating any new  breast masses, nipple changes, skin changes or nipple discharge.    7/20/2022-right breast lumpectomy and bilateral breast reduction  Ductal carcinoma in situ measuring 10 mm  Grade 2  ER/NH positive  All margins negative    She completed adjuvant radiation to the right breast and started tamoxifen in September 2022.    Interval history  Patient is a 48-year-old female with above-mentioned history who returns today, for follow-up.  She recently underwent extensive rheumatological workup and had a positive JALEN which was nonspecific and also a positive SSA which was nonspecific.  Subsequently seen by Dr. Iraheta who attributed the positive labs to her history of breast cancer and does not think that these are significant.  She does have some arthritis in her fingers and her legs.  She is complaining of a rash in her left axilla/thickening of the skin.  She denies any new breast masses.  No other complaints at this time.      The following portions of the patient's history were reviewed and updated as appropriate: allergies, current medications, past family history, past medical history, past social history, past surgical history and problem list.    Past Medical History:   Diagnosis Date    Abnormal ECG     Acid reflux     ON OCC    Anesthesia complication     BP DROPPED DUE TO EPIDURAL FROM C-SECTIONS    Anxiety     Asthma 2017    Occasional reactive asthma/bronchitis after respiratory illness    At risk for sleep apnea     STOP BANG 5    Breast cancer 05/13/2022    Right breast ductal carcinoma in situ, ER/NH positive, Ki-67 5%    Depression 2004    Postpartum with 3 births    Deviated septum     Fatigue     Fissure, anal     History of UTI     Hyperlipidemia     Hypertension 2017    noted at last few urgent care visits    Insomnia     Migraine     Obesity     Pain in elbow joint     bilateral    PVC (premature ventricular contraction) 9/8/2022    Rectal bleeding     RELATED TO RECTAL FISSUE    Seasonal allergic  rhinitis         Past Surgical History:   Procedure Laterality Date    BREAST BIOPSY Right 2022    Tomosynthesis guided Mammotome vacuum assisted right breast 4 o'clock position-Dr. Desmond Zamarripa, [facility]    BREAST LUMPECTOMY Right 2022    Procedure: RIGHT BREAST WIRE LOCALIZED LUMPECTOMY FOR DCIS;  Surgeon: Daisy Romero MD;  Location: [facility] OR;  Service: General;  Laterality: Right;    BREAST SURGERY Bilateral 2022    Procedure: RIGHT BREAST ONCOPLASTIC CLOSURE LEFT BREST REDUCTION FOR SYMMETRY;  Surgeon: Rebecca Bhakta MD;  Location: [facility] MAIN OR;  Service: Plastics;  Laterality: Bilateral;     SECTION N/A     x 3    COLONOSCOPY N/A 2024    Procedure: COLONOSCOPY to cecum withhot snare polypectomies;  Surgeon: Remedios Perez MD;  Location: [facility] ENDOSCOPY;  Service: Gastroenterology;  Laterality: N/A;  screening/diverticulosis, hemorrhoids, polyps    D & C HYSTEROSCOPY ENDOMETRIAL ABLATION N/A 2018    Procedure: DILATATION AND CURETTAGE HYSTEROSCOPY NOVASURE ENDOMETRIAL ABLATION;  Surgeon: Puneet Carreno MD;  Location: [facility] OR OSC;  Service: Obstetrics/Gynecology    MOHS SURGERY Left     Left Elbow & Left Scalp    REDUCTION MAMMAPLASTY      SKIN LESION EXCISION Right     Right Breast, Benign    TUBAL ABDOMINAL LIGATION Bilateral 2013    after last     WISDOM TOOTH EXTRACTION Bilateral         Family History   Problem Relation Age of Onset    Anxiety disorder Mother     Depression Mother     Stroke Mother     Skin cancer Mother         BCE SCE    Colon polyps Mother     Breast cancer Mother 74        DCIS with invasive. DCIS Left Breast 2:30 position ER/MD +, Her2-. Left Breast UOQ Invasive DCIS is multifocal, Two benign sentinel lymph nodes, Pathologic stage: pT1a, N(sn)0.    Arthritis Mother     Cancer Mother         DCIS and invasive    Alcohol abuse Brother     Autism Son     Learning disabilities Son         Autism    Depression Son      Depression Maternal Grandmother     Anxiety disorder Maternal Grandmother     Hyperlipidemia Maternal Grandmother     Hypertension Maternal Grandmother     Heart disease Maternal Grandmother     Arthritis Maternal Grandmother     Stroke Maternal Grandmother     Coronary artery disease Maternal Grandfather     Esophageal cancer Paternal Grandfather 85    Colon cancer Neg Hx     Malig Hyperthermia Neg Hx         Social History     Socioeconomic History    Marital status:      Spouse name: Radu   Tobacco Use    Smoking status: Former     Current packs/day: 0.00     Average packs/day: 1 pack/day for 5.4 years (5.4 ttl pk-yrs)     Types: Cigarettes     Start date:      Quit date: 2001     Years since quittin.2     Passive exposure: Past    Smokeless tobacco: Never   Vaping Use    Vaping status: Never Used   Substance and Sexual Activity    Alcohol use: Yes     Alcohol/week: 2.0 standard drinks of alcohol     Types: 2 Drinks containing 0.5 oz of alcohol per week     Comment: Socially; few times a month    Drug use: Never    Sexual activity: Yes     Partners: Male     Birth control/protection: Surgical        OB History          3    Para   3    Term   3            AB        Living   3         SAB        IAB        Ectopic        Molar        Multiple        Live Births   3                 Allergies   Allergen Reactions    Bupropion Palpitations     POSSIBLE    Adhesive Tape Rash     SWELLS. Localized if left on for extended periods of time            Review of Systems   Constitutional: Negative.    HENT: Negative.     Eyes: Negative.    Respiratory: Negative.     Genitourinary: Negative.    Musculoskeletal:  Positive for arthralgias.   Allergic/Immunologic: Negative.    Neurological: Negative.    Psychiatric/Behavioral:  The patient is nervous/anxious.        Review of systems as mentioned HPI otherwise negative    Objective   Blood pressure 124/83, pulse 99, temperature 98.3 °F (36.8  "°C), temperature source Oral, resp. rate 18, height 170 cm (66.93\"), weight 136 kg (299 lb 4.8 oz), SpO2 98%, not currently breastfeeding.   Physical Exam  Vitals reviewed.   Constitutional:       Appearance: She is obese.   HENT:      Head: Normocephalic and atraumatic.      Nose: Nose normal.      Mouth/Throat:      Mouth: Mucous membranes are moist.   Eyes:      Extraocular Movements: Extraocular movements intact.      Pupils: Pupils are equal, round, and reactive to light.   Cardiovascular:      Rate and Rhythm: Normal rate and regular rhythm.      Pulses: Normal pulses.   Pulmonary:      Effort: Pulmonary effort is normal.   Musculoskeletal:         General: Normal range of motion.      Cervical back: Normal range of motion.   Neurological:      General: No focal deficit present.      Mental Status: She is alert and oriented to person, place, and time.   Psychiatric:         Mood and Affect: Mood normal.         Behavior: Behavior normal.         Thought Content: Thought content normal.         Judgment: Judgment normal.       Breast Exam: On inspection there is changes consistent with mastopexy.  There is underlying scar tissue.  There is some tenderness to palpation in the right lower outer quadrant at the site of the scar.  No other new palpable concerns.  Left breast exam is benign as well.    I have reexamined the patient and the results are consistent with the previously documented exam. Yissel Sharma MD      Orders Only on 08/12/2024   Component Date Value Ref Range Status    A. phagocytophilum PCR 08/13/2024 Negative  Negative Final    Comment: No Anaplasma phagocytophilum DNA detected.  A. phagocytophilum has been  characterized as the causative agent of Human Granulocytic  Ehrlichiosis (HGE).      Ehrlichia chaffeensis PCR 08/13/2024 Negative  Negative Final    Comment: No Ehrlichia chaffeensis DNA detected.  E. chaffeensis has been  characterized as the causative agent of Human Monocytic " Ehrlichiosis  (HME).      Rickettsia rickettsii DNA, RT 08/13/2024 Not Detected   Final    Comment: REFERENCE RANGE: NOT DETECTED  This test was developed and its analytical performance  characteristics have been determined by [facility].  It has not been cleared or approved by FDA. This assay has  been validated pursuant to the CLIA regulations and is  used for clinical purposes.          No radiology results for the last 30 days.   Screening mammogram, bilateral diagnostic mammogram, breast MRI-images independently reviewed and interpreted by me.  Details summarized in the HPI.     8/4/2022-labs reviewed and mild leukocytosis.    Assessment & Plan       *Ductal carcinoma in situ of the right breast  ER +85% strong, NH +85% strong, intermediate grade  Status post right breast lumpectomy and bilateral breast reduction  Pathology showed DCIS measuring 10 mm, margins negative  Tamoxifen started September 2022.  She continues to be compliant with tamoxifen.  Reporting some arthralgias.  Up-to-date on screening mammogram and screening mammogram from March 2024 reviewed and benign.  Repeat mammogram in March 2025    *Anxiety/depression  She is currently on Effexor to help with anxiety  Also on Ativan  Seeing the [facility] to help with worsening of anxiety  Supportive oncology referral made  Also on Vyvanse  Mood reasonably well-controlled on current medications but she did have significant anxiety with the recent lab work and a tick bite which resulted in a nonhealing wound on the left thigh.    *Bilateral elbow pain  Chronic and unchanged  Could be positional   Improved    *Obesity  BMI 47  Gained significant amount of weight since her last visit.  She wants to go on weight loss medications but insurance is not approving.  Recommend that she discuss with her PCP about compounding.  We also discussed regular exercises and dietary modifications.  During the episodes of anxiety patient tends to eat more resulting in  weight gain.    *Abnormal LFTs  New in July 2024.  Lab values from 7/12/2024 and 7/20/2024 reviewed and LFTs have been abnormal at both visits.  Repeat CMP today    PLAN:  Continue tamoxifen 20 mg daily.  Screening mammogram due March 2025.  CMP today  Call/ return sooner should the patient develop any new concerns or problems.      Patient is on a high risk medication requiring close monitoring for toxicity.

## 2024-08-26 RX ORDER — LOSARTAN POTASSIUM 50 MG/1
50 TABLET ORAL DAILY
Qty: 30 TABLET | Refills: 0 | Status: SHIPPED | OUTPATIENT
Start: 2024-08-26

## 2024-08-29 DIAGNOSIS — R74.8 ELEVATED LIVER ENZYMES: Primary | ICD-10-CM

## 2024-09-19 ENCOUNTER — LAB (OUTPATIENT)
Dept: OTHER | Facility: HOSPITAL | Age: 49
End: 2024-09-19
Payer: COMMERCIAL

## 2024-09-19 ENCOUNTER — CLINICAL SUPPORT (OUTPATIENT)
Dept: ONCOLOGY | Facility: HOSPITAL | Age: 49
End: 2024-09-19
Payer: COMMERCIAL

## 2024-09-19 DIAGNOSIS — R74.8 ELEVATED LIVER ENZYMES: ICD-10-CM

## 2024-09-19 LAB
ALBUMIN SERPL-MCNC: 4.2 G/DL (ref 3.5–5.2)
ALBUMIN/GLOB SERPL: 1.3 G/DL
ALP SERPL-CCNC: 95 U/L (ref 39–117)
ALT SERPL W P-5'-P-CCNC: 91 U/L (ref 1–33)
ANION GAP SERPL CALCULATED.3IONS-SCNC: 7.2 MMOL/L (ref 5–15)
AST SERPL-CCNC: 74 U/L (ref 1–32)
BILIRUB SERPL-MCNC: 0.3 MG/DL (ref 0–1.2)
BUN SERPL-MCNC: 13 MG/DL (ref 6–20)
BUN/CREAT SERPL: 18.3 (ref 7–25)
CALCIUM SPEC-SCNC: 9.3 MG/DL (ref 8.6–10.5)
CHLORIDE SERPL-SCNC: 105 MMOL/L (ref 98–107)
CO2 SERPL-SCNC: 28.8 MMOL/L (ref 22–29)
CREAT SERPL-MCNC: 0.71 MG/DL (ref 0.57–1)
EGFRCR SERPLBLD CKD-EPI 2021: 105 ML/MIN/1.73
GLOBULIN UR ELPH-MCNC: 3.2 GM/DL
GLUCOSE SERPL-MCNC: 103 MG/DL (ref 65–99)
POTASSIUM SERPL-SCNC: 4.2 MMOL/L (ref 3.5–5.2)
PROT SERPL-MCNC: 7.4 G/DL (ref 6–8.5)
SODIUM SERPL-SCNC: 141 MMOL/L (ref 136–145)

## 2024-09-19 PROCEDURE — 80053 COMPREHEN METABOLIC PANEL: CPT | Performed by: INTERNAL MEDICINE

## 2024-09-19 PROCEDURE — 36415 COLL VENOUS BLD VENIPUNCTURE: CPT

## 2024-09-26 RX ORDER — LOSARTAN POTASSIUM 50 MG/1
50 TABLET ORAL DAILY
Qty: 30 TABLET | Refills: 0 | Status: SHIPPED | OUTPATIENT
Start: 2024-09-26

## 2024-10-08 ENCOUNTER — OFFICE VISIT (OUTPATIENT)
Dept: FAMILY MEDICINE CLINIC | Facility: CLINIC | Age: 49
End: 2024-10-08
Payer: COMMERCIAL

## 2024-10-08 VITALS
WEIGHT: 293 LBS | HEIGHT: 66 IN | DIASTOLIC BLOOD PRESSURE: 86 MMHG | HEART RATE: 98 BPM | TEMPERATURE: 97.8 F | SYSTOLIC BLOOD PRESSURE: 138 MMHG | OXYGEN SATURATION: 98 % | BODY MASS INDEX: 47.09 KG/M2

## 2024-10-08 DIAGNOSIS — Z23 FLU VACCINE NEED: ICD-10-CM

## 2024-10-08 DIAGNOSIS — E66.813 CLASS 3 SEVERE OBESITY WITH BODY MASS INDEX (BMI) OF 45.0 TO 49.9 IN ADULT, UNSPECIFIED OBESITY TYPE, UNSPECIFIED WHETHER SERIOUS COMORBIDITY PRESENT: ICD-10-CM

## 2024-10-08 DIAGNOSIS — M25.50 ARTHRALGIA, UNSPECIFIED JOINT: Primary | ICD-10-CM

## 2024-10-08 DIAGNOSIS — E66.01 CLASS 3 SEVERE OBESITY WITH BODY MASS INDEX (BMI) OF 45.0 TO 49.9 IN ADULT, UNSPECIFIED OBESITY TYPE, UNSPECIFIED WHETHER SERIOUS COMORBIDITY PRESENT: ICD-10-CM

## 2024-10-08 PROCEDURE — 99214 OFFICE O/P EST MOD 30 MIN: CPT | Performed by: STUDENT IN AN ORGANIZED HEALTH CARE EDUCATION/TRAINING PROGRAM

## 2024-10-08 PROCEDURE — 90471 IMMUNIZATION ADMIN: CPT | Performed by: STUDENT IN AN ORGANIZED HEALTH CARE EDUCATION/TRAINING PROGRAM

## 2024-10-08 PROCEDURE — 90656 IIV3 VACC NO PRSV 0.5 ML IM: CPT | Performed by: STUDENT IN AN ORGANIZED HEALTH CARE EDUCATION/TRAINING PROGRAM

## 2024-10-12 NOTE — PROGRESS NOTES
"Chief Complaint  Weight Loss and Follow-up (Follow up from Rheumatology. 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    Arthralgias - saw rheum. Not believed to be rheumatologic/inflammatory. She does have concern for possible dactylitis of toe which may warrant tx with DMARD. Patient prefers not to start DMARD.     Obesity - stable/not improving. On multiple medications that incr weight. Has been attempting diet and exercise without success.        Objective   Vital Signs:  /86   Pulse 98   Temp 97.8 °F (36.6 °C)   Ht 167.6 cm (66\")   Wt 134 kg (296 lb)   SpO2 98%   BMI 47.78 kg/m²   Estimated body mass index is 47.78 kg/m² as calculated from the following:    Height as of this encounter: 167.6 cm (66\").    Weight as of this encounter: 134 kg (296 lb).    Class 3 Severe Obesity (BMI >=40). Obesity-related health conditions include the following: hypertension and dyslipidemias. Obesity is unchanged. BMI is is above average; BMI management plan is completed. We discussed portion control and increasing exercise.      Physical Exam  Constitutional:       General: She is not in acute distress.  Eyes:      Conjunctiva/sclera: Conjunctivae normal.   Cardiovascular:      Rate and Rhythm: Normal rate and regular rhythm.   Pulmonary:      Effort: Pulmonary effort is normal. No respiratory distress.   Abdominal:      Palpations: Abdomen is soft.      Tenderness: There is no abdominal tenderness.   Neurological:      Mental Status: She is alert and oriented to person, place, and time.   Psychiatric:         Mood and Affect: Mood normal.         Behavior: Behavior normal.     Result Review :  The following data was reviewed by: Abigail Pennington MD on 10/08/2024:  Common labs          7/18/2024    10:19 8/22/2024    16:27 " 9/19/2024    09:38   Common Labs   Glucose 99  97  103    BUN 15  13  13    Creatinine 0.76  0.75  0.71    Sodium 139  140  141    Potassium 4.6  4.7  4.2    Chloride 99  101  105    Calcium 9.4  9.4  9.3    Total Protein 7.2      Albumin 4.3  4.2  4.2    Total Bilirubin 0.3  0.2  0.3    Alkaline Phosphatase 100  101  95    AST (SGOT) 103  123  74    ALT (SGPT) 85  100  91      Data reviewed : see HPI above.           Assessment and Plan   Diagnoses and all orders for this visit:    1. Arthralgia, unspecified joint (Primary)    2. Class 3 severe obesity with body mass index (BMI) of 45.0 to 49.9 in adult, unspecified obesity type, unspecified whether serious comorbidity present  -     Semaglutide-Weight Management 0.25 MG/0.5ML solution auto-injector; Inject 0.5 mL under the skin into the appropriate area as directed 1 (One) Time Per Week.  Dispense: 2 mL; Refill: 0    3. Flu vaccine need  -     Fluzone >6mos ([id number])    Arthralgias - saw Rheum. Not believed to be rheumatologic. Does have + JALEN but low-titer. Patient does not wish to start DMARD. Continue current plan.  Obesity - no improvement with diet and exercise plan.On multiple medications that cause weight gain - needs for anxiety/depression at this time. No hx of thyroid cancer, MEN2 syndrome, pancreatitis. Start semaglutide 0.25mg weekly. Will obtain from compound pharmacy due to cost.     Follow up in 10-12 weeks.          Follow Up   Return in about 10 weeks (around 12/17/2024) for Annual physical.  Patient was given instructions and counseling regarding her condition or for health maintenance advice. Please see specific information pulled into the AVS if appropriate.             Answers submitted by the patient for this visit:  Other (Submitted on 10/8/2024)  Please describe your symptoms.: Interested in weight loss medications. I fail at everything I try.  Have you had these symptoms before?: Yes  How long have you been having these symptoms?:  Greater than 2 weeks  Primary Reason for Visit (Submitted on 10/8/2024)  What is the primary reason for your visit?: Problem Not Listed

## 2024-10-16 DIAGNOSIS — R40.0 DAYTIME SOMNOLENCE: Primary | ICD-10-CM

## 2024-10-21 ENCOUNTER — TELEPHONE (OUTPATIENT)
Dept: ONCOLOGY | Facility: CLINIC | Age: 49
End: 2024-10-21
Payer: COMMERCIAL

## 2024-10-21 DIAGNOSIS — D05.11 DUCTAL CARCINOMA IN SITU (DCIS) OF RIGHT BREAST: Primary | ICD-10-CM

## 2024-10-21 NOTE — TELEPHONE ENCOUNTER
Caller: Radha Torre    Relationship: Self    Best call back number: [phone]       Who are you requesting to speak with (clinical staff, provider,  specific staff member): CLINICAL      What was the call regarding: PATIENT CALLING TO SCHEDULE LAB AND POSSIBLE FU FOR A SOONER APPT.    PATIETN EXPERIENCING BLISTERED, HIVE-LIKE, RASHES ON LEFT BREAST ON AND OFF AGAIN SINCE MAY 2024.    PATIENT ALSO EXPERIENCING A PAINFUL SHOCKING SENSATION IN BREAST FOR ABOUT 3-5 DAYS    PATIENT WISHES TO DISCUSS WITH OFFICE

## 2024-10-21 NOTE — TELEPHONE ENCOUNTER
Called patient, let her know per Dr. Sharma, that we should bring her in to schedule a F/U appt in the next week weeks. Pt v/u

## 2024-10-21 NOTE — TELEPHONE ENCOUNTER
Called patient, said that she has 3 spots on her breast that she thought was shingles and went to  back in May and  everything resolved. Said that she has had hives/rash that pop on her left breast, first back in April 2023 and go away within 30 mins about the size of a dime. States that his has happened about 10 times sporadically until now.       Said that she had a painful shock in her left side of her left breast that made her jump. Said that it was off and on, started on Saturday and went away on its own. Patient states that she is not feeling anything currently, no pain or hives. Is asking if this is concerning? She said she read on the internet that this could be signs of breast cancer.

## 2024-10-22 ENCOUNTER — TELEPHONE (OUTPATIENT)
Dept: ONCOLOGY | Facility: CLINIC | Age: 49
End: 2024-10-22
Payer: COMMERCIAL

## 2024-10-22 NOTE — TELEPHONE ENCOUNTER
Caller: Radha Torre    Relationship: Self    Best call back number: [phone]     What is the best time to reach you: ASAP    Who are you requesting to speak with (clinical staff, provider,  specific staff member): SCHEDULING      What was the call regarding: PT CHECKING IF ANY AVAILABLE APPTS AT ANY LOCATION PRIOR TO 10/30.  SHE WILL KEEP 10/30 IF THAT IS EARLIEST AVAILABLE, BUT SHE IS NERVOUS AND CHECKING FOR EARLIER APPT AT ANY LOCATION.  PLEASE CALL PT TO ADVISE.  HUB UNABLE TO SCHEDULE WITH NP.

## 2024-10-23 NOTE — PROGRESS NOTES
Subjective   Radha Torre is a 49 y.o. female.  Referred by Sofia Mora for right breast ductal carcinoma in situ    History of Present Illness   Mr. Torre is a 47-year-old premenopausal  lady who presented with a screen detected abnormality of the right breast in March 2022.  She has had previous normal screening mammograms.  This mammogram had been delayed by about 8 months due to COVID-19.    3/7/2022-bilateral screening mammogram  Indeterminate left breast asymmetry.  Further evaluation recommended.  Indeterminate right breast calcifications.  Further evaluation recommended.    4/18/2022-bilateral diagnostic mammogram and ultrasound  The area of focal asymmetry in the middle third of the left breast effaces and consistent with normal breast parenchyma  Right breast-presence of cluster of microcalcifications in the middle third lower inner quadrant.  Right breast finding suspicious for malignancy.  Stereotactic guided biopsy recommended.    5/13/2022-right breast ear tactic biopsy  Pathology consistent with ductal carcinoma in situ  Intermediate grade  ER +85% strong  NE +85% strong    5/20/2022-bilateral breast MRI  There is a 2.1 cm biopsy cavity with two 1 cm focal areas of enhancement along the cavity margins representing the site of biopsy-proven malignancy.  Residual calcifications measure 1.4 cm on postbiopsy mammogram.  No MRI evidence of left breast malignancy.    5/25/2022-she has been evaluated by Dr. Romero and has been recommended to undergo a right breast lumpectomy.  Lumpectomy has not been scheduled yet.  Pending plastics evaluation by Dr. Bhakta.    5/29/2022-Invitae 9 gene stat panel negative.    Family history significant for diagnosis of breast cancer in her mother at the age of 74.  Her mother is a patient of mine, Ms. Marce Bell.   No other family history of breast ovarian pancreatic prostate carcinoma.  No history of melanomas.    Radha denies palpating any new  breast masses, nipple changes, skin changes or nipple discharge.    7/20/2022-right breast lumpectomy and bilateral breast reduction  Ductal carcinoma in situ measuring 10 mm  Grade 2  ER/IL positive  All margins negative    She completed adjuvant radiation to the right breast and started tamoxifen in September 2022.    Interval history:  During the office today, 10/24/2024 for triage visit regarding intermittent whelps on her left breast.  She has a history of DCIS of the right breast and continues on tamoxifen daily.  She tolerates tamoxifen reasonably well.  She reports back in May she was diagnosed with shingles under her left breast at which time she had fluid-filled blisters in a linear pattern.  She does show pictures of intermittent circular whelps that appear under her left breast sporadically in different locations and typically itch.  She reports these do resolve very quickly.  She denies any changes in laundry detergents lotions or body products.  These pictures date back to August 2023 which was prior to her most recent mammogram.  She does have concerns that she has an inflammatory malignancy in her left breast after reading on the Internet.  Currently has no changes in her left breast.  She also reports a single episode of a sharp pain in her left breast.      The following portions of the patient's history were reviewed and updated as appropriate: allergies, current medications, past family history, past medical history, past social history, past surgical history and problem list.    Past Medical History:   Diagnosis Date    Abnormal ECG     Acid reflux     ON OCC    ADHD (attention deficit hyperactivity disorder) 2023    Allergic     Anesthesia complication     BP DROPPED DUE TO EPIDURAL FROM C-SECTIONS    Anxiety     Asthma 2017    Occasional reactive asthma/bronchitis after respiratory illness    At risk for sleep apnea     STOP BANG 5    Breast cancer 05/13/2022    Right breast ductal carcinoma  in situ, ER/HI positive, Ki-67 5%    Cataract     Depression 2004    Postpartum with 3 births    Deviated septum     Diverticulosis     Fatigue     Fissure, anal     History of UTI     Hyperlipidemia     Hypertension 2017    noted at last few urgent care visits    Insomnia     Migraine     Obesity     Pain in elbow joint     bilateral    PVC (premature ventricular contraction) 2022    Rectal bleeding     RELATED TO RECTAL FISSUE    Seasonal allergic rhinitis         Past Surgical History:   Procedure Laterality Date    BREAST BIOPSY Right 2022    Tomosynthesis guided Mammotome vacuum assisted right breast 4 o'clock position-Dr. Desmond Zamarripa, [facility]    BREAST LUMPECTOMY Right 2022    Procedure: RIGHT BREAST WIRE LOCALIZED LUMPECTOMY FOR DCIS;  Surgeon: Daisy Romero MD;  Location: [facility] OR;  Service: General;  Laterality: Right;    BREAST SURGERY Bilateral 2022    Procedure: RIGHT BREAST ONCOPLASTIC CLOSURE LEFT BREST REDUCTION FOR SYMMETRY;  Surgeon: Rebecca Bhakta MD;  Location: [facility] OR;  Service: Plastics;  Laterality: Bilateral;     SECTION N/A     x 3    COLONOSCOPY N/A 2024    Procedure: COLONOSCOPY to cecum withhot snare polypectomies;  Surgeon: Remedios Perez MD;  Location: [facility] ENDOSCOPY;  Service: Gastroenterology;  Laterality: N/A;  screening/diverticulosis, hemorrhoids, polyps    D & C HYSTEROSCOPY ENDOMETRIAL ABLATION N/A 2018    Procedure: DILATATION AND CURETTAGE HYSTEROSCOPY NOVASURE ENDOMETRIAL ABLATION;  Surgeon: Puneet Carreno MD;  Location: [facility] OR OSC;  Service: Obstetrics/Gynecology    ENDOMETRIAL ABLATION      MOHS SURGERY Left     Left Elbow & Left Scalp    REDUCTION MAMMAPLASTY      SKIN LESION EXCISION Right     Right Breast, Benign    TUBAL ABDOMINAL LIGATION Bilateral 2013    after last     WISDOM TOOTH EXTRACTION Bilateral         Family History   Problem Relation Age of Onset    Anxiety disorder  Mother     Depression Mother     Stroke Mother         TIA    Skin cancer Mother         BCE SCE    Colon polyps Mother     Breast cancer Mother 74        DCIS with invasive. DCIS Left Breast 2:30 position ER/ND +, Her2-. Left Breast UOQ Invasive DCIS is multifocal, Two benign sentinel lymph nodes, Pathologic stage: pT1a, N(sn)0.    Arthritis Mother     Cancer Mother         DCIS and invasive    Alcohol abuse Brother     Autism Son     Learning disabilities Son         Autism    Depression Son     Anxiety disorder Son     Developmental Disability Son         Autism Spectrum disorder level 1    Depression Maternal Grandmother     Anxiety disorder Maternal Grandmother     Hyperlipidemia Maternal Grandmother     Hypertension Maternal Grandmother     Heart disease Maternal Grandmother     Arthritis Maternal Grandmother     Stroke Maternal Grandmother     Coronary artery disease Maternal Grandfather     Esophageal cancer Paternal Grandfather 85    Anxiety disorder Daughter     Depression Daughter     Colon cancer Neg Hx     Malig Hyperthermia Neg Hx         Social History     Socioeconomic History    Marital status:      Spouse name: Radu   Tobacco Use    Smoking status: Former     Current packs/day: 0.00     Average packs/day: 1 pack/day for 10.0 years (10.0 ttl pk-yrs)     Types: Cigarettes     Start date:      Quit date: 2001     Years since quittin.4     Passive exposure: Past    Smokeless tobacco: Never   Vaping Use    Vaping status: Never Used   Substance and Sexual Activity    Alcohol use: Yes     Alcohol/week: 2.0 standard drinks of alcohol     Comment: Maybe once a month    Drug use: Never    Sexual activity: Yes     Partners: Male     Birth control/protection: Tubal ligation, Surgical        OB History          3    Para   3    Term   3            AB        Living   3         SAB        IAB        Ectopic        Molar        Multiple        Live Births   3              "    Allergies   Allergen Reactions    Bupropion Palpitations     POSSIBLE    Adhesive Tape Rash     SWELLS. Localized if left on for extended periods of time        Review of Systems  Review of systems as mentioned HPI otherwise negative    Objective   Blood pressure 112/78, pulse 89, temperature 98.1 °F (36.7 °C), temperature source Oral, resp. rate 16, height 167.6 cm (65.98\"), weight 133 kg (292 lb 11.2 oz), SpO2 96%, not currently breastfeeding.     Physical Exam  Vitals reviewed.   Constitutional:       Appearance: She is obese.   HENT:      Head: Normocephalic and atraumatic.      Nose: Nose normal.      Mouth/Throat:      Mouth: Mucous membranes are moist.   Eyes:      Extraocular Movements: Extraocular movements intact.      Pupils: Pupils are equal, round, and reactive to light.   Pulmonary:      Effort: Pulmonary effort is normal. No respiratory distress.   Musculoskeletal:         General: Normal range of motion.      Cervical back: Normal range of motion.   Neurological:      General: No focal deficit present.      Mental Status: She is alert and oriented to person, place, and time.   Psychiatric:         Mood and Affect: Mood normal.         Behavior: Behavior normal.         Thought Content: Thought content normal.         Judgment: Judgment normal.       Breast Exam: On inspection there is changes consistent with mastopexy.  There is underlying scar tissue.  There is some tenderness to palpation in the right lower outer quadrant at the site of the scar.       Left breast with no palpable abnormalities, status post mastopexy.  No skin changes, currently no whelps or areas that are raised or erythematous, no palpable abnormalities, no axillary adenopathy    I have reexamined the patient and the results are consistent with the previously documented exam. Yelena Simms, ALEXIS      Results from last 7 days   Lab Units 10/24/24  0854   WBC 10*3/mm3 7.53   NEUTROS ABS 10*3/mm3 4.61   HEMOGLOBIN g/dL " 12.9   HEMATOCRIT % 40.5   PLATELETS 10*3/mm3 282     Results from last 7 days   Lab Units 10/24/24  0854   SODIUM mmol/L 138   POTASSIUM mmol/L 4.5   CHLORIDE mmol/L 103   CO2 mmol/L 26.4   BUN mg/dL 13   CREATININE mg/dL 0.73   CALCIUM mg/dL 8.7   ALBUMIN g/dL 4.0   BILIRUBIN mg/dL 0.2   ALK PHOS U/L 101   ALT (SGPT) U/L 70*   AST (SGOT) U/L 64*   GLUCOSE mg/dL 102*             No radiology results for the last 30 days.     Assessment & Plan     *Ductal carcinoma in situ of the right breast  ER +85% strong, MN +85% strong, intermediate grade  Status post right breast lumpectomy and bilateral breast reduction  Pathology showed DCIS measuring 10 mm, margins negative  Tamoxifen started September 2022.  She continues to be compliant with tamoxifen.  Reporting some arthralgias.  Up-to-date on screening mammogram and screening mammogram from March 2024 reviewed and benign.  Repeat mammogram in March 2025  Continuing tamoxifen with overall very good tolerance  10/24/2024 continuing tamoxifen with excellent tolerance.     *Anxiety/depression  She is currently on Effexor to help with anxiety  Also on Ativan  Seeing the couch to help with worsening of anxiety  Supportive oncology referral made  Also on Vyvanse  Mood reasonably well-controlled on current medications but she did have significant anxiety with the recent lab work and a tick bite which resulted in a nonhealing wound on the left thigh.    *Bilateral elbow pain  Chronic and unchanged  Could be positional   Improved    *Obesity  Body mass index is 47.27 kg/m².   Gained significant amount of weight since her last visit.  She wants to go on weight loss medications but insurance is not approving.  Recommend that she discuss with her PCP about compounding.  We also discussed regular exercises and dietary modifications.  During the episodes of anxiety patient tends to eat more resulting in weight gain.    *Abnormal LFTs  New in July 2024.  Lab values from 7/12/2024 and  7/20/2024 reviewed and LFTs have been abnormal at both visits.  Liver function studies improved today with AST of 64, ALT 70    *Sporadic whelps on her left breast  Patient sent messages via [employer]  Triage visit 10/24/2024 with reports of sporadic red spots and whelps that itch on her left breast that spontaneously occur but also resolved within minutes.  She denies any topical changes with skin care or laundry detergent  No palpable abnormalities in her left breast I reassured the patient I have no concerns for malignancy in her left breast  If symptoms recur, she can contact dermatology or be seen for triage visit to evaluate the rash, though clinically on exam there is nothing today.    PLAN:  Continue tamoxifen 20 mg daily.  Screening mammogram due March 2025.  The patient will contact dermatology if sporadic erythematous spots return, though they are not appreciated on exam today  Follow-up in March 2025 with nurse practitioner for breast exam  Annual MD follow-up with Dr. Sharma    Patient is on a high risk medication requiring close monitoring for toxicity.    Yelena Simms, ALEXIS  10/24/2024

## 2024-10-24 ENCOUNTER — LAB (OUTPATIENT)
Dept: LAB | Facility: HOSPITAL | Age: 49
End: 2024-10-24
Payer: COMMERCIAL

## 2024-10-24 ENCOUNTER — OFFICE VISIT (OUTPATIENT)
Dept: ONCOLOGY | Facility: CLINIC | Age: 49
End: 2024-10-24
Payer: COMMERCIAL

## 2024-10-24 VITALS
HEART RATE: 89 BPM | RESPIRATION RATE: 16 BRPM | SYSTOLIC BLOOD PRESSURE: 112 MMHG | BODY MASS INDEX: 47.04 KG/M2 | OXYGEN SATURATION: 96 % | DIASTOLIC BLOOD PRESSURE: 78 MMHG | WEIGHT: 292.7 LBS | HEIGHT: 66 IN | TEMPERATURE: 98.1 F

## 2024-10-24 DIAGNOSIS — R74.8 ELEVATED LIVER ENZYMES: ICD-10-CM

## 2024-10-24 DIAGNOSIS — D05.11 DUCTAL CARCINOMA IN SITU (DCIS) OF RIGHT BREAST: ICD-10-CM

## 2024-10-24 DIAGNOSIS — D05.11 DUCTAL CARCINOMA IN SITU (DCIS) OF RIGHT BREAST: Primary | ICD-10-CM

## 2024-10-24 LAB
ALBUMIN SERPL-MCNC: 4 G/DL (ref 3.5–5.2)
ALBUMIN/GLOB SERPL: 1.3 G/DL
ALP SERPL-CCNC: 101 U/L (ref 39–117)
ALT SERPL W P-5'-P-CCNC: 70 U/L (ref 1–33)
ANION GAP SERPL CALCULATED.3IONS-SCNC: 8.6 MMOL/L (ref 5–15)
AST SERPL-CCNC: 64 U/L (ref 1–32)
BASOPHILS # BLD AUTO: 0.03 10*3/MM3 (ref 0–0.2)
BASOPHILS NFR BLD AUTO: 0.4 % (ref 0–1.5)
BILIRUB SERPL-MCNC: 0.2 MG/DL (ref 0–1.2)
BUN SERPL-MCNC: 13 MG/DL (ref 6–20)
BUN/CREAT SERPL: 17.8 (ref 7–25)
CALCIUM SPEC-SCNC: 8.7 MG/DL (ref 8.6–10.5)
CHLORIDE SERPL-SCNC: 103 MMOL/L (ref 98–107)
CO2 SERPL-SCNC: 26.4 MMOL/L (ref 22–29)
CREAT SERPL-MCNC: 0.73 MG/DL (ref 0.57–1)
DEPRECATED RDW RBC AUTO: 42.5 FL (ref 37–54)
EGFRCR SERPLBLD CKD-EPI 2021: 101.6 ML/MIN/1.73
EOSINOPHIL # BLD AUTO: 0.2 10*3/MM3 (ref 0–0.4)
EOSINOPHIL NFR BLD AUTO: 2.7 % (ref 0.3–6.2)
ERYTHROCYTE [DISTWIDTH] IN BLOOD BY AUTOMATED COUNT: 12.7 % (ref 12.3–15.4)
GLOBULIN UR ELPH-MCNC: 3.1 GM/DL
GLUCOSE SERPL-MCNC: 102 MG/DL (ref 65–99)
HCT VFR BLD AUTO: 40.5 % (ref 34–46.6)
HGB BLD-MCNC: 12.9 G/DL (ref 12–15.9)
IMM GRANULOCYTES # BLD AUTO: 0.02 10*3/MM3 (ref 0–0.05)
IMM GRANULOCYTES NFR BLD AUTO: 0.3 % (ref 0–0.5)
LYMPHOCYTES # BLD AUTO: 2.08 10*3/MM3 (ref 0.7–3.1)
LYMPHOCYTES NFR BLD AUTO: 27.6 % (ref 19.6–45.3)
MCH RBC QN AUTO: 28.9 PG (ref 26.6–33)
MCHC RBC AUTO-ENTMCNC: 31.9 G/DL (ref 31.5–35.7)
MCV RBC AUTO: 90.8 FL (ref 79–97)
MONOCYTES # BLD AUTO: 0.59 10*3/MM3 (ref 0.1–0.9)
MONOCYTES NFR BLD AUTO: 7.8 % (ref 5–12)
NEUTROPHILS NFR BLD AUTO: 4.61 10*3/MM3 (ref 1.7–7)
NEUTROPHILS NFR BLD AUTO: 61.2 % (ref 42.7–76)
NRBC BLD AUTO-RTO: 0 /100 WBC (ref 0–0.2)
PLATELET # BLD AUTO: 282 10*3/MM3 (ref 140–450)
PMV BLD AUTO: 10.2 FL (ref 6–12)
POTASSIUM SERPL-SCNC: 4.5 MMOL/L (ref 3.5–5.2)
PROT SERPL-MCNC: 7.1 G/DL (ref 6–8.5)
RBC # BLD AUTO: 4.46 10*6/MM3 (ref 3.77–5.28)
SODIUM SERPL-SCNC: 138 MMOL/L (ref 136–145)
WBC NRBC COR # BLD AUTO: 7.53 10*3/MM3 (ref 3.4–10.8)

## 2024-10-24 PROCEDURE — 80053 COMPREHEN METABOLIC PANEL: CPT

## 2024-10-24 PROCEDURE — 36415 COLL VENOUS BLD VENIPUNCTURE: CPT

## 2024-10-24 PROCEDURE — 85025 COMPLETE CBC W/AUTO DIFF WBC: CPT

## 2024-10-25 RX ORDER — LOSARTAN POTASSIUM 50 MG/1
50 TABLET ORAL DAILY
Qty: 30 TABLET | Refills: 0 | Status: SHIPPED | OUTPATIENT
Start: 2024-10-25

## 2024-11-04 DIAGNOSIS — E66.01 CLASS 3 SEVERE OBESITY WITHOUT SERIOUS COMORBIDITY WITH BODY MASS INDEX (BMI) OF 45.0 TO 49.9 IN ADULT, UNSPECIFIED OBESITY TYPE: Primary | ICD-10-CM

## 2024-11-04 DIAGNOSIS — E66.813 CLASS 3 SEVERE OBESITY WITHOUT SERIOUS COMORBIDITY WITH BODY MASS INDEX (BMI) OF 45.0 TO 49.9 IN ADULT, UNSPECIFIED OBESITY TYPE: Primary | ICD-10-CM

## 2024-11-04 RX ORDER — TAMOXIFEN CITRATE 20 MG/1
20 TABLET ORAL DAILY
Qty: 90 TABLET | Refills: 3 | Status: SHIPPED | OUTPATIENT
Start: 2024-11-04

## 2024-11-25 RX ORDER — LOSARTAN POTASSIUM 50 MG/1
50 TABLET ORAL DAILY
Qty: 30 TABLET | Refills: 0 | Status: SHIPPED | OUTPATIENT
Start: 2024-11-25

## 2024-11-29 DIAGNOSIS — E66.01 CLASS 3 SEVERE OBESITY WITHOUT SERIOUS COMORBIDITY WITH BODY MASS INDEX (BMI) OF 45.0 TO 49.9 IN ADULT, UNSPECIFIED OBESITY TYPE: ICD-10-CM

## 2024-11-29 DIAGNOSIS — E66.813 CLASS 3 SEVERE OBESITY WITHOUT SERIOUS COMORBIDITY WITH BODY MASS INDEX (BMI) OF 45.0 TO 49.9 IN ADULT, UNSPECIFIED OBESITY TYPE: ICD-10-CM

## 2024-11-29 NOTE — TELEPHONE ENCOUNTER
Pt requesting to increase dose. Tolerating 0.5 mg >4W  Pending for increased dose, please sign if appropriate  Rx Refill Note  Requested Prescriptions     Pending Prescriptions Disp Refills    Semaglutide-Weight Management 0.5 MG/0.5ML solution auto-injector 2 mL 0     Sig: Inject 0.5 mL under the skin into the appropriate area as directed 1 (One) Time Per Week.      Last office visit with prescribing clinician: 10/8/2024   Last telemedicine visit with prescribing clinician: Visit date not found   Next office visit with prescribing clinician: 2/3/2025                         Would you like a call back once the refill request has been completed: [] Yes [] No    If the office needs to give you a call back, can they leave a voicemail: [] Yes [] No    Jacki Guerra CMA/LMR  11/29/24, 11:47 EST

## 2024-12-03 DIAGNOSIS — E66.813 CLASS 3 SEVERE OBESITY WITH BODY MASS INDEX (BMI) OF 45.0 TO 49.9 IN ADULT, UNSPECIFIED OBESITY TYPE, UNSPECIFIED WHETHER SERIOUS COMORBIDITY PRESENT: Primary | ICD-10-CM

## 2024-12-03 DIAGNOSIS — E66.01 CLASS 3 SEVERE OBESITY WITH BODY MASS INDEX (BMI) OF 45.0 TO 49.9 IN ADULT, UNSPECIFIED OBESITY TYPE, UNSPECIFIED WHETHER SERIOUS COMORBIDITY PRESENT: Primary | ICD-10-CM

## 2024-12-26 RX ORDER — LOSARTAN POTASSIUM 50 MG/1
50 TABLET ORAL DAILY
Qty: 30 TABLET | Refills: 0 | Status: SHIPPED | OUTPATIENT
Start: 2024-12-26

## 2025-01-17 ENCOUNTER — OFFICE VISIT (OUTPATIENT)
Dept: SLEEP MEDICINE | Facility: HOSPITAL | Age: 50
End: 2025-01-17
Payer: COMMERCIAL

## 2025-01-17 VITALS
SYSTOLIC BLOOD PRESSURE: 107 MMHG | HEIGHT: 66 IN | HEART RATE: 106 BPM | BODY MASS INDEX: 44.36 KG/M2 | DIASTOLIC BLOOD PRESSURE: 74 MMHG | OXYGEN SATURATION: 96 % | WEIGHT: 276 LBS

## 2025-01-17 DIAGNOSIS — I10 ESSENTIAL HYPERTENSION: ICD-10-CM

## 2025-01-17 DIAGNOSIS — E66.01 CLASS 3 SEVERE OBESITY DUE TO EXCESS CALORIES WITH BODY MASS INDEX (BMI) OF 45.0 TO 49.9 IN ADULT, UNSPECIFIED WHETHER SERIOUS COMORBIDITY PRESENT: ICD-10-CM

## 2025-01-17 DIAGNOSIS — G47.19 EXCESSIVE DAYTIME SLEEPINESS: ICD-10-CM

## 2025-01-17 DIAGNOSIS — G47.59 OTHER PARASOMNIA: ICD-10-CM

## 2025-01-17 DIAGNOSIS — D50.0 IRON DEFICIENCY ANEMIA DUE TO CHRONIC BLOOD LOSS: ICD-10-CM

## 2025-01-17 DIAGNOSIS — E66.813 CLASS 3 SEVERE OBESITY DUE TO EXCESS CALORIES WITH BODY MASS INDEX (BMI) OF 45.0 TO 49.9 IN ADULT, UNSPECIFIED WHETHER SERIOUS COMORBIDITY PRESENT: ICD-10-CM

## 2025-01-17 DIAGNOSIS — D05.11 DUCTAL CARCINOMA IN SITU (DCIS) OF RIGHT BREAST: ICD-10-CM

## 2025-01-17 DIAGNOSIS — R40.0 DAYTIME SOMNOLENCE: Primary | ICD-10-CM

## 2025-01-17 DIAGNOSIS — C50.919 MALIGNANT NEOPLASM OF FEMALE BREAST, UNSPECIFIED ESTROGEN RECEPTOR STATUS, UNSPECIFIED LATERALITY, UNSPECIFIED SITE OF BREAST: ICD-10-CM

## 2025-01-17 PROCEDURE — G0463 HOSPITAL OUTPT CLINIC VISIT: HCPCS
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
## 2025-01-23 RX ORDER — LOSARTAN POTASSIUM 50 MG/1
50 TABLET ORAL DAILY
Qty: 30 TABLET | Refills: 0 | Status: SHIPPED | OUTPATIENT
Start: 2025-01-23

## 2025-01-23 RX ORDER — METOPROLOL TARTRATE 50 MG
50 TABLET ORAL 2 TIMES DAILY
Qty: 60 TABLET | Refills: 0 | Status: SHIPPED | OUTPATIENT
Start: 2025-01-23

## 2025-01-23 NOTE — TELEPHONE ENCOUNTER
Called and left a  for patient rescheduling her appointment that she had to cancel due to her being sick. Gave her  next available at [place]. Explained if she could not make it to call the office back to reschedule.

## 2025-01-27 DIAGNOSIS — E66.813 CLASS 3 SEVERE OBESITY WITH BODY MASS INDEX (BMI) OF 45.0 TO 49.9 IN ADULT, UNSPECIFIED OBESITY TYPE, UNSPECIFIED WHETHER SERIOUS COMORBIDITY PRESENT: ICD-10-CM

## 2025-01-27 DIAGNOSIS — E66.01 CLASS 3 SEVERE OBESITY WITH BODY MASS INDEX (BMI) OF 45.0 TO 49.9 IN ADULT, UNSPECIFIED OBESITY TYPE, UNSPECIFIED WHETHER SERIOUS COMORBIDITY PRESENT: ICD-10-CM

## 2025-02-12 ENCOUNTER — OFFICE VISIT (OUTPATIENT)
Dept: CARDIOLOGY | Facility: CLINIC | Age: 50
End: 2025-02-12
Payer: COMMERCIAL

## 2025-02-12 VITALS
DIASTOLIC BLOOD PRESSURE: 86 MMHG | HEIGHT: 67 IN | SYSTOLIC BLOOD PRESSURE: 126 MMHG | WEIGHT: 281.4 LBS | HEART RATE: 102 BPM | OXYGEN SATURATION: 96 % | BODY MASS INDEX: 44.17 KG/M2

## 2025-02-12 DIAGNOSIS — R00.2 PALPITATIONS: ICD-10-CM

## 2025-02-12 DIAGNOSIS — I49.3 PVC (PREMATURE VENTRICULAR CONTRACTION): Primary | ICD-10-CM

## 2025-02-12 PROCEDURE — 99214 OFFICE O/P EST MOD 30 MIN: CPT | Performed by: INTERNAL MEDICINE

## 2025-02-12 PROCEDURE — 93000 ELECTROCARDIOGRAM COMPLETE: CPT | Performed by: INTERNAL MEDICINE

## 2025-02-12 NOTE — PROGRESS NOTES
Subjective:     Encounter Date:02/12/25      Patient ID: Radha Torre is a 49 y.o. female.    Chief Complaint:  History of Present Illness    Dear Sofia,    I had the pleasure of seeing this patient in the office today for follow-up of her cardiac status, she has a history of PVCs.    Patient has a known history of PVCs.  She says lately she has been feeling them on a regular basis.  Some fatigability at times.  Rare dizziness.  No presyncope or syncope.  No chest pain or chest discomfort.    Been having some issues with otitis media for the last month.    I saw her initially September 2022 for this.  She reports that she is previously been following with Dr. Foreman.  Years ago she saw Dr. Nicholas.  He has been known to have PVCs for very long time.  She states that more recently she saw Dr. Foreman had an evaluation including a 30-day monitor.  She was having fairly frequent PVCs that were symptomatic.  She is not sure exactly what the monitor other testing showed patient was placed on metoprolol and takes metoprolol tartrate 50 mg twice daily.      She has a history of right-sided breast cancer.  She has undergone a lumpectomy which was performed July 20, 2022.  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  All margins were negative.  She is currently undergoing radiation therapy to the right breast.  She has not received chemotherapy.    No known history of coronary disease, congestive heart failure, rheumatic fever, rheumatic heart disease, or congenital heart disease.    The following portions of the patient's history were reviewed and updated as appropriate: allergies, current medications, past family history, past medical history, past social history, past surgical history and problem list.      ECG 12 Lead    Date/Time: 2/12/2025 11:55 AM  Performed by: Jay Jeffrey III, MD    Authorized by: Jay Jeffrey III, MD  Comparison: compared with previous ECG   Comparison to previous ECG:  "Ventricular bigeminy is noted  Rhythm: sinus rhythm  Ectopy: bigeminy  Rate: normal  Conduction: conduction normal  ST Segments: ST segments normal  T Waves: T waves normal  QRS axis: normal  Other: no other findings    Clinical impression: normal ECG             Objective:     Vitals:    02/12/25 1123   BP: 126/86   BP Location: Right arm   Patient Position: Sitting   Cuff Size: Large Adult   Pulse: 102   SpO2: 96%   Weight: 128 kg (281 lb 6.4 oz)   Height: 170.2 cm (67\")       Body mass index is 44.07 kg/m².      Vitals reviewed.   Constitutional:       General: Not in acute distress.     Appearance: Well-developed. Not diaphoretic.   Eyes:      General:         Right eye: No discharge.         Left eye: No discharge.      Conjunctiva/sclera: Conjunctivae normal.      Pupils: Pupils are equal, round, and reactive to light.   HENT:      Head: Normocephalic and atraumatic.      Nose: Nose normal.   Neck:      Thyroid: No thyromegaly.      Trachea: No tracheal deviation.      Lymphadenopathy: No cervical adenopathy.   Pulmonary:      Effort: Pulmonary effort is normal. No respiratory distress.      Breath sounds: Normal breath sounds. No stridor.   Chest:      Chest wall: Not tender to palpatation.   Cardiovascular:      Normal rate. Regular rhythm.      Murmurs: There is no murmur.      . No S3 gallop. No click. No rub.   Pulses:     Intact distal pulses.   Edema:     Peripheral edema absent.   Abdominal:      General: Bowel sounds are normal. There is no distension.      Palpations: Abdomen is soft. There is no abdominal mass.      Tenderness: There is no abdominal tenderness. There is no guarding or rebound.   Musculoskeletal: Normal range of motion.         General: No tenderness or deformity.      Cervical back: Normal range of motion and neck supple. Skin:     General: Skin is warm and dry.      Findings: No erythema or rash.   Neurological:      Mental Status: Alert and oriented to person, place, and time.     "  Deep Tendon Reflexes: Reflexes are normal and symmetric.   Psychiatric:         Thought Content: Thought content normal.         Data and records reviewed:     Lab Results   Component Value Date    GLUCOSE 102 (H) 10/24/2024    BUN 13 10/24/2024    CREATININE 0.73 10/24/2024    EGFRIFNONA 100 01/10/2022    EGFRIFAFRI 115 01/10/2022    BCR 17.8 10/24/2024    K 4.5 10/24/2024    CO2 26.4 10/24/2024    CALCIUM 8.7 10/24/2024    ALBUMIN 4.0 10/24/2024    LABIL2 1.5 07/12/2024    AST 64 (H) 10/24/2024    ALT 70 (H) 10/24/2024     Lab Results   Component Value Date    CHOL 162 04/17/2023     Lab Results   Component Value Date    TRIG 212 (H) 04/17/2023    TRIG 412 (H) 01/10/2022    TRIG 169 (H) 08/01/2019     Lab Results   Component Value Date    HDL 46 04/17/2023    HDL 45 01/10/2022    HDL 45 08/01/2019     Lab Results   Component Value Date    LDL 81 04/17/2023     (H) 01/10/2022     (H) 08/01/2019     Lab Results   Component Value Date    VLDL 35 04/17/2023    VLDL 75 (H) 01/10/2022    VLDL 33.8 08/01/2019     Lab Results   Component Value Date    LDLHDL 1.60 04/17/2023    LDLHDL 3.0 01/10/2022     CBC          7/12/2024    14:31 10/24/2024    08:54   CBC   WBC 6.01  7.53    RBC 4.49  4.46    Hemoglobin 12.9  12.9    Hematocrit 39.4  40.5    MCV 87.8  90.8    MCH 28.7  28.9    MCHC 32.7  31.9    RDW 12.5  12.7    Platelets 314  282      No radiology results for the last 90 days.            Assessment:          Diagnosis Plan   1. PVC (premature ventricular contraction)  Adult Transthoracic Echo Complete W/ Cont if Necessary Per Protocol    Holter Monitor - 24 Hour    ECG 12 Lead      2. Palpitations  Adult Transthoracic Echo Complete W/ Cont if Necessary Per Protocol    Holter Monitor - 24 Hour    ECG 12 Lead                 Plan:       1.  PVCs-on metoprolol, today he is in bigeminy which we have not seen previously, I would arrange for Holter monitor  2.  Hypertension, on losartan and metoprolol,  blood pressure is good control today, we will follow  3.  Also noted some fatigability, we will also get an echocardiogram.    Thank you very much for allowing us to participate in the care of this pleasant patient.  Please don't hesitate to call if I can be of assistance in any way.      Current Outpatient Medications:     Cyanocobalamin (VITAMIN B 12 PO), Take 1 tablet by mouth Daily., Disp: , Rfl:     gabapentin (NEURONTIN) 600 MG tablet, , Disp: , Rfl:     lamoTRIgine (LaMICtal) 150 MG tablet, 1 tablet., Disp: , Rfl:     LORazepam (ATIVAN) 1 MG tablet, Take 1 tablet by mouth 3 (Three) Times a Day As Needed., Disp: , Rfl:     losartan (COZAAR) 50 MG tablet, TAKE 1 TABLET BY MOUTH DAILY, Disp: 30 tablet, Rfl: 0    metoprolol tartrate (LOPRESSOR) 50 MG tablet, TAKE 1 TABLET BY MOUTH TWICE A DAY, Disp: 60 tablet, Rfl: 0    neomycin-polymyxin-hydrocortisone (CORTISPORIN) 3.5-08077-0 otic solution, Administer 3 drops to the right ear 3 (Three) Times a Day for 7 days., Disp: 10 mL, Rfl: 0    pravastatin (PRAVACHOL) 20 MG tablet, TAKE ONE TABLET BY MOUTH ONCE NIGHTLY, Disp: 90 tablet, Rfl: 3    SEMAGLUTIDE, 1 MG/DOSE, SC, Inject 1 mL under the skin into the appropriate area as directed 1 (One) Time Per Week., Disp: , Rfl:     tamoxifen (NOLVADEX) 20 MG chemo tablet, Take 1 tablet by mouth Daily., Disp: 90 tablet, Rfl: 3    venlafaxine XR (EFFEXOR-XR) 150 MG 24 hr capsule, Take 1 capsule by mouth Every Morning., Disp: , Rfl:     venlafaxine XR (EFFEXOR-XR) 75 MG 24 hr capsule, Take 1 capsule by mouth Daily. Takes with 150 MG tablet, Disp: , Rfl:          No follow-ups on file.

## 2025-02-14 ENCOUNTER — PATIENT ROUNDING (BHMG ONLY) (OUTPATIENT)
Dept: URGENT CARE | Facility: CLINIC | Age: 50
End: 2025-02-14
Payer: COMMERCIAL

## 2025-02-14 NOTE — ED NOTES
Thank you for letting us care for you in your recent visit to our urgent care center. We would love to hear about your experience with us. Was this the first time you have visited our location?    We’re always looking for ways to make our patients’ experiences even better. Do you have any recommendations on ways we may improve?     I appreciate you taking the time to respond. Please be on the lookout for a survey about your recent visit from [employer] via text or email. We would greatly appreciate if you could fill that out and turn it back in. We want your voice to be heard and we value your feedback.   Thank you for choosing [facility] for your healthcare needs.

## 2025-02-21 ENCOUNTER — HOSPITAL ENCOUNTER (OUTPATIENT)
Dept: CARDIOLOGY | Facility: HOSPITAL | Age: 50
Discharge: HOME | End: 2025-02-21
Payer: COMMERCIAL

## 2025-02-21 VITALS — HEIGHT: 67 IN | WEIGHT: 281 LBS | BODY MASS INDEX: 44.1 KG/M2

## 2025-02-21 DIAGNOSIS — I49.3 PVC (PREMATURE VENTRICULAR CONTRACTION): ICD-10-CM

## 2025-02-21 DIAGNOSIS — R00.2 PALPITATIONS: ICD-10-CM

## 2025-02-21 LAB
AORTIC DIMENSIONLESS INDEX: 0.7 (DI)
AV MEAN PRESS GRAD SYS DOP V1V2: 4 MMHG
AV VMAX SYS DOP: 134 CM/SEC
BH CV ECHO MEAS - AO MAX PG: 7.2 MMHG
BH CV ECHO MEAS - AO ROOT DIAM: 2.8 CM
BH CV ECHO MEAS - AO V2 VTI: 24.2 CM
BH CV ECHO MEAS - AVA(I,D): 2.47 CM2
BH CV ECHO MEAS - EDV(CUBED): 117.6 ML
BH CV ECHO MEAS - EDV(MOD-SP2): 112 ML
BH CV ECHO MEAS - EDV(MOD-SP4): 141 ML
BH CV ECHO MEAS - EF(MOD-SP2): 54.5 %
BH CV ECHO MEAS - EF(MOD-SP4): 54.6 %
BH CV ECHO MEAS - ESV(CUBED): 33.8 ML
BH CV ECHO MEAS - ESV(MOD-SP2): 51 ML
BH CV ECHO MEAS - ESV(MOD-SP4): 64 ML
BH CV ECHO MEAS - FS: 34 %
BH CV ECHO MEAS - IVS/LVPW: 0.88 CM
BH CV ECHO MEAS - IVSD: 0.7 CM
BH CV ECHO MEAS - LAT PEAK E' VEL: 12.1 CM/SEC
BH CV ECHO MEAS - LV DIASTOLIC VOL/BSA (35-75): 60.3 CM2
BH CV ECHO MEAS - LV MASS(C)D: 120.8 GRAMS
BH CV ECHO MEAS - LV MAX PG: 3.3 MMHG
BH CV ECHO MEAS - LV MEAN PG: 2 MMHG
BH CV ECHO MEAS - LV SYSTOLIC VOL/BSA (12-30): 27.4 CM2
BH CV ECHO MEAS - LV V1 MAX: 90.2 CM/SEC
BH CV ECHO MEAS - LV V1 VTI: 15.8 CM
BH CV ECHO MEAS - LVIDD: 4.9 CM
BH CV ECHO MEAS - LVIDS: 3.2 CM
BH CV ECHO MEAS - LVOT AREA: 3.8 CM2
BH CV ECHO MEAS - LVOT DIAM: 2.19 CM
BH CV ECHO MEAS - LVPWD: 0.8 CM
BH CV ECHO MEAS - MED PEAK E' VEL: 8.2 CM/SEC
BH CV ECHO MEAS - MV A DUR: 0.1 SEC
BH CV ECHO MEAS - MV A MAX VEL: 98 CM/SEC
BH CV ECHO MEAS - MV DEC SLOPE: 1024 CM/SEC2
BH CV ECHO MEAS - MV DEC TIME: 106 SEC
BH CV ECHO MEAS - MV E MAX VEL: 106.4 CM/SEC
BH CV ECHO MEAS - MV E/A: 1.09
BH CV ECHO MEAS - MV MAX PG: 5.1 MMHG
BH CV ECHO MEAS - MV MEAN PG: 3.3 MMHG
BH CV ECHO MEAS - MV P1/2T: 31.8 MSEC
BH CV ECHO MEAS - MV V2 VTI: 19.6 CM
BH CV ECHO MEAS - MVA(P1/2T): 6.9 CM2
BH CV ECHO MEAS - MVA(VTI): 3 CM2
BH CV ECHO MEAS - PA ACC TIME: 0.13 SEC
BH CV ECHO MEAS - PA V2 MAX: 104.3 CM/SEC
BH CV ECHO MEAS - PI END-D VEL: 108.9 CM/SEC
BH CV ECHO MEAS - RAP SYSTOLE: 3 MMHG
BH CV ECHO MEAS - RV MAX PG: 1.8 MMHG
BH CV ECHO MEAS - RV V1 MAX: 67.1 CM/SEC
BH CV ECHO MEAS - RV V1 VTI: 13.7 CM
BH CV ECHO MEAS - SV(LVOT): 59.7 ML
BH CV ECHO MEAS - SV(MOD-SP2): 61 ML
BH CV ECHO MEAS - SV(MOD-SP4): 77 ML
BH CV ECHO MEAS - SVI(LVOT): 25.6 ML/M2
BH CV ECHO MEAS - SVI(MOD-SP2): 26.1 ML/M2
BH CV ECHO MEAS - SVI(MOD-SP4): 33 ML/M2
BH CV ECHO MEAS - TAPSE (>1.6): 2.04 CM
BH CV ECHO MEASUREMENTS AVERAGE E/E' RATIO: 10.48
BH CV XLRA - RV BASE: 3.3 CM
BH CV XLRA - RV LENGTH: 7.7 CM
BH CV XLRA - RV MID: 3 CM
BH CV XLRA - TDI S': 12.7 CM/SEC
LEFT ATRIUM VOLUME INDEX: 10.1 ML/M2
LV EF BIPLANE MOD: 54.2 %

## 2025-02-21 PROCEDURE — 25510000001 PERFLUTREN 6.52 MG/ML SUSPENSION 2 ML VIAL: Performed by: INTERNAL MEDICINE

## 2025-02-21 PROCEDURE — 93225 XTRNL ECG REC<48 HRS REC: CPT

## 2025-02-21 PROCEDURE — 93306 TTE W/DOPPLER COMPLETE: CPT

## 2025-02-21 PROCEDURE — 93226 XTRNL ECG REC<48 HR SCAN A/R: CPT

## 2025-02-21 RX ADMIN — SODIUM CHLORIDE 2 ML: 9 INJECTION INTRAMUSCULAR; INTRAVENOUS; SUBCUTANEOUS at 09:26

## 2025-02-26 LAB
CV ZIO BASELINE AVG BPM: 99
CV ZIO BASELINE BPM HIGH: 132
CV ZIO BASELINE BPM LOW: 78

## 2025-02-26 RX ORDER — METOPROLOL TARTRATE 50 MG
50 TABLET ORAL 2 TIMES DAILY
Qty: 180 TABLET | Refills: 3 | Status: SHIPPED | OUTPATIENT
Start: 2025-02-26

## 2025-02-26 RX ORDER — LOSARTAN POTASSIUM 50 MG/1
50 TABLET ORAL DAILY
Qty: 30 TABLET | Refills: 0 | Status: SHIPPED | OUTPATIENT
Start: 2025-02-26

## 2025-02-27 ENCOUNTER — HOSPITAL ENCOUNTER (OUTPATIENT)
Dept: SLEEP MEDICINE | Facility: HOSPITAL | Age: 50
Discharge: HOME | End: 2025-02-27
Admitting: PSYCHIATRY & NEUROLOGY
Payer: COMMERCIAL

## 2025-02-27 ENCOUNTER — TELEPHONE (OUTPATIENT)
Dept: CARDIOLOGY | Facility: CLINIC | Age: 50
End: 2025-02-27
Payer: COMMERCIAL

## 2025-02-27 DIAGNOSIS — D05.11 DUCTAL CARCINOMA IN SITU (DCIS) OF RIGHT BREAST: ICD-10-CM

## 2025-02-27 DIAGNOSIS — D50.0 IRON DEFICIENCY ANEMIA DUE TO CHRONIC BLOOD LOSS: ICD-10-CM

## 2025-02-27 DIAGNOSIS — G47.59 OTHER PARASOMNIA: ICD-10-CM

## 2025-02-27 DIAGNOSIS — G47.19 EXCESSIVE DAYTIME SLEEPINESS: ICD-10-CM

## 2025-02-27 DIAGNOSIS — C50.919 MALIGNANT NEOPLASM OF FEMALE BREAST, UNSPECIFIED ESTROGEN RECEPTOR STATUS, UNSPECIFIED LATERALITY, UNSPECIFIED SITE OF BREAST: ICD-10-CM

## 2025-02-27 DIAGNOSIS — I10 ESSENTIAL HYPERTENSION: ICD-10-CM

## 2025-02-27 DIAGNOSIS — E66.01 CLASS 3 SEVERE OBESITY DUE TO EXCESS CALORIES WITH BODY MASS INDEX (BMI) OF 45.0 TO 49.9 IN ADULT, UNSPECIFIED WHETHER SERIOUS COMORBIDITY PRESENT: ICD-10-CM

## 2025-02-27 DIAGNOSIS — R40.0 DAYTIME SOMNOLENCE: ICD-10-CM

## 2025-02-27 DIAGNOSIS — E66.813 CLASS 3 SEVERE OBESITY DUE TO EXCESS CALORIES WITH BODY MASS INDEX (BMI) OF 45.0 TO 49.9 IN ADULT, UNSPECIFIED WHETHER SERIOUS COMORBIDITY PRESENT: ICD-10-CM

## 2025-02-27 PROCEDURE — G0399 HOME SLEEP TEST/TYPE 3 PORTA: HCPCS

## 2025-02-27 NOTE — TELEPHONE ENCOUNTER
Notified Radha Torre of results, patient verbalizes understanding.    Johnna Arizmendi RN  [facility] Triage  02/27/25 16:29 EST

## 2025-02-27 NOTE — TELEPHONE ENCOUNTER
----- Message from Jay Jeffrey sent at 2/27/2025  4:10 PM EST -----  Please call-spine, some PVCs but nothing sustained, known history of PVCs, continue as she has been doing, see me in 1 year.

## 2025-02-27 NOTE — TELEPHONE ENCOUNTER
Called and left VM, will continue to try to reach pt.    HUB- please put patient straight through to triage    Zoe Skinner, RN  Triage RN  02/27/25 16:15 EST

## 2025-03-04 DIAGNOSIS — E66.01 CLASS 3 SEVERE OBESITY DUE TO EXCESS CALORIES WITH BODY MASS INDEX (BMI) OF 45.0 TO 49.9 IN ADULT, UNSPECIFIED WHETHER SERIOUS COMORBIDITY PRESENT: Primary | ICD-10-CM

## 2025-03-04 DIAGNOSIS — E66.813 CLASS 3 SEVERE OBESITY DUE TO EXCESS CALORIES WITH BODY MASS INDEX (BMI) OF 45.0 TO 49.9 IN ADULT, UNSPECIFIED WHETHER SERIOUS COMORBIDITY PRESENT: Primary | ICD-10-CM

## 2025-03-04 DIAGNOSIS — G47.33 OSA (OBSTRUCTIVE SLEEP APNEA): ICD-10-CM

## 2025-03-04 DIAGNOSIS — I10 ESSENTIAL HYPERTENSION: ICD-10-CM

## 2025-03-04 DIAGNOSIS — I49.3 PVC (PREMATURE VENTRICULAR CONTRACTION): Chronic | ICD-10-CM

## 2025-03-04 DIAGNOSIS — G47.19 EXCESSIVE DAYTIME SLEEPINESS: ICD-10-CM

## 2025-03-05 ENCOUNTER — DOCUMENTATION (OUTPATIENT)
Dept: SLEEP MEDICINE | Facility: HOSPITAL | Age: 50
End: 2025-03-05
Payer: COMMERCIAL

## 2025-03-05 NOTE — PROGRESS NOTES
Gave Pt sleep study results over the phone.  Pt verbalized that she understood.  Pt's supply and therapy device order sent over to [facility].  Compliance and follow up scheduled for 5/2025

## 2025-03-17 ENCOUNTER — HOSPITAL ENCOUNTER (OUTPATIENT)
Dept: MAMMOGRAPHY | Facility: HOSPITAL | Age: 50
Discharge: HOME | End: 2025-03-17
Admitting: INTERNAL MEDICINE
Payer: COMMERCIAL

## 2025-03-17 DIAGNOSIS — D05.11 DUCTAL CARCINOMA IN SITU (DCIS) OF RIGHT BREAST: ICD-10-CM

## 2025-03-17 DIAGNOSIS — Z12.31 SCREENING MAMMOGRAM FOR BREAST CANCER: ICD-10-CM

## 2025-03-17 PROCEDURE — 77067 SCR MAMMO BI INCL CAD: CPT

## 2025-03-17 PROCEDURE — 77063 BREAST TOMOSYNTHESIS BI: CPT

## 2025-03-25 RX ORDER — LOSARTAN POTASSIUM 50 MG/1
50 TABLET ORAL DAILY
Qty: 30 TABLET | Refills: 0 | Status: SHIPPED | OUTPATIENT
Start: 2025-03-25

## 2025-04-02 RX ORDER — METOPROLOL TARTRATE 50 MG
50 TABLET ORAL 2 TIMES DAILY
Qty: 10 TABLET | Refills: 0 | Status: SHIPPED | OUTPATIENT
Start: 2025-04-02

## 2025-04-02 RX ORDER — LOSARTAN POTASSIUM 50 MG/1
50 TABLET ORAL DAILY
Qty: 5 TABLET | Refills: 0 | Status: SHIPPED | OUTPATIENT
Start: 2025-04-02

## 2025-04-09 ENCOUNTER — TELEPHONE (OUTPATIENT)
Dept: ONCOLOGY | Facility: CLINIC | Age: 50
End: 2025-04-09
Payer: COMMERCIAL

## 2025-04-09 ENCOUNTER — DOCUMENTATION (OUTPATIENT)
Dept: SLEEP MEDICINE | Facility: HOSPITAL | Age: 50
End: 2025-04-09
Payer: COMMERCIAL

## 2025-04-09 ENCOUNTER — TELEPHONE (OUTPATIENT)
Dept: OBSTETRICS AND GYNECOLOGY | Age: 50
End: 2025-04-09

## 2025-04-09 NOTE — TELEPHONE ENCOUNTER
Hub staff attempted to follow warm transfer process and was unsuccessful     Caller: Radha Torre    Relationship to patient: Self    Best call back number: [phone]      Patient is needing: PT NEEDS TO RESCHEDULE HER ANNUAL EXAM WITH AN U/S

## 2025-04-09 NOTE — PROGRESS NOTES
Received message thru Epic messages from the [employer] Rep:  Pt cannot be reached to start on cpap device.  Message in epic also stated that Pt was left 4 different emails.  Sleep center staff called pt today 4/9/2025 and the VM states on pts phone that the VM is full and cannot be left a message.

## 2025-04-09 NOTE — TELEPHONE ENCOUNTER
Caller: Radha Torre    Relationship to patient: Self    Best call back number: [phone]    Chief complaint: PATIENT CALLED TO RESCHEDULE     Type of visit: VITALS AND FOLLOW UP 1      If rescheduling, when is the original appointment: 3-6-25

## 2025-04-16 ENCOUNTER — TELEPHONE (OUTPATIENT)
Dept: ONCOLOGY | Facility: CLINIC | Age: 50
End: 2025-04-16
Payer: COMMERCIAL

## 2025-04-16 ENCOUNTER — OFFICE VISIT (OUTPATIENT)
Dept: FAMILY MEDICINE CLINIC | Facility: CLINIC | Age: 50
End: 2025-04-16
Payer: COMMERCIAL

## 2025-04-16 VITALS
HEART RATE: 68 BPM | DIASTOLIC BLOOD PRESSURE: 74 MMHG | SYSTOLIC BLOOD PRESSURE: 128 MMHG | HEIGHT: 67 IN | WEIGHT: 278 LBS | TEMPERATURE: 98.4 F | BODY MASS INDEX: 43.63 KG/M2 | OXYGEN SATURATION: 97 %

## 2025-04-16 DIAGNOSIS — I10 ESSENTIAL HYPERTENSION: ICD-10-CM

## 2025-04-16 DIAGNOSIS — E66.01 CLASS 3 SEVERE OBESITY WITH BODY MASS INDEX (BMI) OF 45.0 TO 49.9 IN ADULT, UNSPECIFIED OBESITY TYPE, UNSPECIFIED WHETHER SERIOUS COMORBIDITY PRESENT: Primary | ICD-10-CM

## 2025-04-16 DIAGNOSIS — G47.33 OSA (OBSTRUCTIVE SLEEP APNEA): ICD-10-CM

## 2025-04-16 DIAGNOSIS — E66.813 CLASS 3 SEVERE OBESITY WITH BODY MASS INDEX (BMI) OF 45.0 TO 49.9 IN ADULT, UNSPECIFIED OBESITY TYPE, UNSPECIFIED WHETHER SERIOUS COMORBIDITY PRESENT: Primary | ICD-10-CM

## 2025-04-16 PROCEDURE — 99213 OFFICE O/P EST LOW 20 MIN: CPT | Performed by: STUDENT IN AN ORGANIZED HEALTH CARE EDUCATION/TRAINING PROGRAM

## 2025-04-16 NOTE — PROGRESS NOTES
"Chief Complaint  Med Management (Yes to tami.)    Subjective    {Problem List  Visit Diagnosis   Encounters  Notes  Medications  Labs  Result Review Imaging  Media :23}    Radha Torre presents to [facility]  History of Present Illness              Objective   Vital Signs:  /74   Pulse 68   Temp 98.4 °F (36.9 °C)   Ht 170.2 cm (67\")   Wt 126 kg (278 lb)   SpO2 97%   BMI 43.54 kg/m²   Estimated body mass index is 43.54 kg/m² as calculated from the following:    Height as of this encounter: 170.2 cm (67\").    Weight as of this encounter: 126 kg (278 lb).            Physical Exam   Result Review :{Labs  Result Review  Imaging  Med Tab  Media  Procedures :23}  {The following data was reviewed by (Optional):55501}  {Ambulatory Labs (Optional):72003}  {Data reviewed (Optional):12381:::1}          Assessment and Plan {CC Problem List  Visit Diagnosis   ROS  Review (Popup)  Health Maintenance  Quality  BestPractice  Medications  SmartSets  SnapShot Encounters  Media :23}  There are no diagnoses linked to this encounter.                 {Time Spent (Optional):16047}  Follow Up {Instructions Charge Capture  Follow-up Communications :23}  No follow-ups on file.  Patient was given instructions and counseling regarding her condition or for health maintenance advice. Please see specific information pulled into the AVS if appropriate.     {[facility] [employer] Provider Statement:30819}            "

## 2025-04-18 DIAGNOSIS — Z13.0 SCREENING FOR DEFICIENCY ANEMIA: ICD-10-CM

## 2025-04-18 DIAGNOSIS — Z00.00 ENCOUNTER FOR ANNUAL HEALTH EXAMINATION: Primary | ICD-10-CM

## 2025-04-18 DIAGNOSIS — Z13.6 SCREENING FOR ISCHEMIC HEART DISEASE: ICD-10-CM

## 2025-04-18 NOTE — PROGRESS NOTES
"Chief Complaint  Med Management (Yes to tami.)    Subjective        Radha Torre presents to [facility]  History of Present Illness  49-year-old female with hypertension, DCIS of the right breast s/p lumpectomy, PVCs who presents for follow up.    Started on semaglutide and weight is overall down.  She feels that overall she is doing better.  Depression is better controlled.  Continues to have hot flashes and arthralgias but these are stable/improved.    She is on tamoxifen and experienced a lot of hot flashes which have improved since being on Effexor and gabapentin.  She has concerns about whether this may be menopause related?  She has history of ablation so does not have menstrual cycle.    She was also diagnosed with sleep apnea since I last saw her.      Objective   Vital Signs:  /74   Pulse 68   Temp 98.4 °F (36.9 °C)   Ht 170.2 cm (67\")   Wt 126 kg (278 lb)   SpO2 97%   BMI 43.54 kg/m²   Estimated body mass index is 43.54 kg/m² as calculated from the following:    Height as of this encounter: 170.2 cm (67\").    Weight as of this encounter: 126 kg (278 lb).            Physical Exam  Constitutional:       General: She is not in acute distress.  Eyes:      Conjunctiva/sclera: Conjunctivae normal.   Cardiovascular:      Rate and Rhythm: Normal rate and regular rhythm.   Pulmonary:      Effort: Pulmonary effort is normal. No respiratory distress.      Breath sounds: Normal breath sounds.   Abdominal:      Palpations: Abdomen is soft.      Tenderness: There is no abdominal tenderness.   Skin:     General: Skin is warm and dry.   Neurological:      Mental Status: She is alert and oriented to person, place, and time.   Psychiatric:         Mood and Affect: Mood normal.         Behavior: Behavior normal.        Result Review :  The following data was reviewed by: Abigail Pennington MD on 04/16/2025:  Common labs          8/22/2024    16:27 9/19/2024    09:38 10/24/2024    " 08:54   Common Labs   Glucose 97  103  102    BUN 13  13  13    Creatinine 0.75  0.71  0.73    Sodium 140  141  138    Potassium 4.7  4.2  4.5    Chloride 101  105  103    Calcium 9.4  9.3  8.7    Albumin 4.2  4.2  4.0    Total Bilirubin 0.2  0.3  0.2    Alkaline Phosphatase 101  95  101    AST (SGOT) 123  74  64    ALT (SGPT) 100  91  70    WBC   7.53    Hemoglobin   12.9    Hematocrit   40.5    Platelets   282      Data reviewed : none           Assessment and Plan   Diagnoses and all orders for this visit:    1. Class 3 severe obesity with body mass index (BMI) of 45.0 to 49.9 in adult, unspecified obesity type, unspecified whether serious comorbidity present (Primary)  -     Semaglutide-Weight Management 1 MG/0.5ML solution auto-injector; Inject 0.5 mL under the skin into the appropriate area as directed 1 (One) Time Per Week.  Dispense: 2 mL; Refill: 3    2. JOHN (obstructive sleep apnea)  -     Semaglutide-Weight Management 1 MG/0.5ML solution auto-injector; Inject 0.5 mL under the skin into the appropriate area as directed 1 (One) Time Per Week.  Dispense: 2 mL; Refill: 3    3. Essential hypertension  -     Semaglutide-Weight Management 1 MG/0.5ML solution auto-injector; Inject 0.5 mL under the skin into the appropriate area as directed 1 (One) Time Per Week.  Dispense: 2 mL; Refill: 3             Follow Up   No follow-ups on file.  Patient was given instructions and counseling regarding her condition or for health maintenance advice. Please see specific information pulled into the AVS if appropriate.

## 2025-04-25 ENCOUNTER — OFFICE VISIT (OUTPATIENT)
Dept: ONCOLOGY | Facility: CLINIC | Age: 50
End: 2025-04-25
Payer: COMMERCIAL

## 2025-04-25 VITALS
HEIGHT: 67 IN | HEART RATE: 105 BPM | WEIGHT: 280 LBS | TEMPERATURE: 97 F | OXYGEN SATURATION: 96 % | SYSTOLIC BLOOD PRESSURE: 118 MMHG | DIASTOLIC BLOOD PRESSURE: 82 MMHG | BODY MASS INDEX: 43.95 KG/M2

## 2025-04-25 DIAGNOSIS — Z79.899 HIGH RISK MEDICATION USE: ICD-10-CM

## 2025-04-25 DIAGNOSIS — T45.1X5A HOT FLASHES DUE TO TAMOXIFEN: ICD-10-CM

## 2025-04-25 DIAGNOSIS — R23.2 HOT FLASHES DUE TO TAMOXIFEN: ICD-10-CM

## 2025-04-25 DIAGNOSIS — R74.8 ELEVATED LIVER ENZYMES: ICD-10-CM

## 2025-04-25 DIAGNOSIS — D05.11 DUCTAL CARCINOMA IN SITU (DCIS) OF RIGHT BREAST: Primary | ICD-10-CM

## 2025-04-25 RX ORDER — VENLAFAXINE HYDROCHLORIDE 75 MG/1
75 CAPSULE, EXTENDED RELEASE ORAL DAILY
Qty: 90 CAPSULE | Refills: 1 | Status: SHIPPED | OUTPATIENT
Start: 2025-04-25

## 2025-04-25 RX ORDER — VENLAFAXINE HYDROCHLORIDE 150 MG/1
150 CAPSULE, EXTENDED RELEASE ORAL EVERY MORNING
Qty: 90 CAPSULE | Refills: 1 | Status: SHIPPED | OUTPATIENT
Start: 2025-04-25

## 2025-04-25 RX ORDER — TAMOXIFEN CITRATE 20 MG/1
20 TABLET ORAL DAILY
Qty: 90 TABLET | Refills: 3 | Status: SHIPPED | OUTPATIENT
Start: 2025-04-25

## 2025-04-25 NOTE — PROGRESS NOTES
Subjective   Radha Torre is a 49 y.o. female.  Referred by Sofia Mora for right breast ductal carcinoma in situ    History of Present Illness   Mr. Torre is a 47-year-old premenopausal  lady who presented with a screen detected abnormality of the right breast in March 2022.  She has had previous normal screening mammograms.  This mammogram had been delayed by about 8 months due to COVID-19.    3/7/2022-bilateral screening mammogram  Indeterminate left breast asymmetry.  Further evaluation recommended.  Indeterminate right breast calcifications.  Further evaluation recommended.    4/18/2022-bilateral diagnostic mammogram and ultrasound  The area of focal asymmetry in the middle third of the left breast effaces and consistent with normal breast parenchyma  Right breast-presence of cluster of microcalcifications in the middle third lower inner quadrant.  Right breast finding suspicious for malignancy.  Stereotactic guided biopsy recommended.    5/13/2022-right breast ear tactic biopsy  Pathology consistent with ductal carcinoma in situ  Intermediate grade  ER +85% strong  TN +85% strong    5/20/2022-bilateral breast MRI  There is a 2.1 cm biopsy cavity with two 1 cm focal areas of enhancement along the cavity margins representing the site of biopsy-proven malignancy.  Residual calcifications measure 1.4 cm on postbiopsy mammogram.  No MRI evidence of left breast malignancy.    5/25/2022-she has been evaluated by Dr. Romero and has been recommended to undergo a right breast lumpectomy.  Lumpectomy has not been scheduled yet.  Pending plastics evaluation by Dr. Bhakta.    5/29/2022-Invitae 9 gene stat panel negative.    Family history significant for diagnosis of breast cancer in her mother at the age of 74.  Her mother is a patient of mine, Ms. Marce Bell.   No other family history of breast ovarian pancreatic prostate carcinoma.  No history of melanomas.    Radha denies palpating any new  breast masses, nipple changes, skin changes or nipple discharge.    7/20/2022-right breast lumpectomy and bilateral breast reduction  Ductal carcinoma in situ measuring 10 mm  Grade 2  ER/NE positive  All margins negative    She completed adjuvant radiation to the right breast and started tamoxifen in September 2022.    Interval history:   The patient is a 49 y.o. female with the above-mentioned history, who returns the office today for 6-month follow-up and review.  She continues on tamoxifen 20 mg daily with overall fair tolerance.  She does continue to have some hot flashes despite nightly gabapentin and Effexor.  She has no new breast pain.  She did have a previous triage visit for whelps developing on her left breast.  She reports these do continue to be intermittent.  She has not yet contacted her dermatologist.    The following portions of the patient's history were reviewed and updated as appropriate: allergies, current medications, past family history, past medical history, past social history, past surgical history and problem list.    Past Medical History:   Diagnosis Date    Abnormal ECG     Acid reflux     ON OCC    ADHD (attention deficit hyperactivity disorder) 2023    Allergic     Anesthesia complication     BP DROPPED DUE TO EPIDURAL FROM C-SECTIONS    Anxiety     Arthritis 2022    With Tamoxifen use    Asthma 2017    Occasional reactive asthma/bronchitis after respiratory illness    At risk for sleep apnea     STOP BANG 5    Breast cancer 05/13/2022    Right breast ductal carcinoma in situ, ER/NE positive, Ki-67 5%    Cataract 2024    Depression 2004    Postpartum with 3 births    Deviated septum     Diverticulosis     Fatigue     Fissure, anal     History of UTI     Hyperlipidemia     Hypertension 2017    noted at last few urgent care visits    Insomnia     Migraine     Obesity     Pain in elbow joint     bilateral    PMS (premenstrual syndrome)     PVC (premature ventricular contraction)  2022    Rectal bleeding     RELATED TO RECTAL FISSUE    Rh incompatibility     Seasonal allergic rhinitis         Past Surgical History:   Procedure Laterality Date    BREAST BIOPSY Right 2022    Tomosynthesis guided Mammotome vacuum assisted right breast 4 o'clock position-Dr. Desmond Zamarripa, [facility]    BREAST LUMPECTOMY Right 2022    Procedure: RIGHT BREAST WIRE LOCALIZED LUMPECTOMY FOR DCIS;  Surgeon: Daisy Romero MD;  Location: [facility] OR;  Service: General;  Laterality: Right;    BREAST SURGERY Bilateral 2022    Procedure: RIGHT BREAST ONCOPLASTIC CLOSURE LEFT BREST REDUCTION FOR SYMMETRY;  Surgeon: Rebecca Bhakta MD;  Location: [facility] MAIN OR;  Service: Plastics;  Laterality: Bilateral;     SECTION N/A     x 3     SECTION WITH TUBAL      COLONOSCOPY N/A 2024    Procedure: COLONOSCOPY to cecum withhot snare polypectomies;  Surgeon: Remedios Perez MD;  Location: [facility] ENDOSCOPY;  Service: Gastroenterology;  Laterality: N/A;  screening/diverticulosis, hemorrhoids, polyps    D & C HYSTEROSCOPY ENDOMETRIAL ABLATION N/A 2018    Procedure: DILATATION AND CURETTAGE HYSTEROSCOPY NOVASURE ENDOMETRIAL ABLATION;  Surgeon: Puneet Carreno MD;  Location: [facility] OR OSC;  Service: Obstetrics/Gynecology    ENDOMETRIAL ABLATION      MOHS SURGERY Left     Left Elbow & Left Scalp    REDUCTION MAMMAPLASTY      SKIN LESION EXCISION Right     Right Breast, Benign    TUBAL ABDOMINAL LIGATION Bilateral 2013    after last     WISDOM TOOTH EXTRACTION Bilateral         Family History   Problem Relation Age of Onset    Anxiety disorder Mother     Depression Mother     Stroke Mother         TIA    Skin cancer Mother         BCE SCE    Colon polyps Mother     Breast cancer Mother 74        DCIS with invasive. DCIS Left Breast 2:30 position ER/MO +, Her2-. Left Breast UOQ Invasive DCIS is multifocal, Two benign sentinel lymph nodes, Pathologic stage: pT1a,  N(sn)0.    Arthritis Mother     Cancer Mother         DCIS and invasive    Anemia Mother     Fainting Mother     Hypertension Mother     Alcohol abuse Brother     Autism Son     Learning disabilities Son         Autism    Depression Son     Anxiety disorder Son     Developmental Disability Son         Autism Spectrum disorder level 1    Depression Maternal Grandmother     Anxiety disorder Maternal Grandmother     Hyperlipidemia Maternal Grandmother     Hypertension Maternal Grandmother     Heart disease Maternal Grandmother     Arthritis Maternal Grandmother     Stroke Maternal Grandmother     Coronary artery disease Maternal Grandfather     Esophageal cancer Paternal Grandfather 85    Anxiety disorder Daughter     Depression Daughter     Developmental Disability Son         Autism    Cancer Paternal Uncle         Prostate    Cancer Paternal Uncle         Prostate    Cancer Paternal Uncle         Prostate    Colon cancer Neg Hx     Malig Hyperthermia Neg Hx         Social History     Socioeconomic History    Marital status:      Spouse name: Radu   Tobacco Use    Smoking status: Former     Current packs/day: 0.00     Average packs/day: 1 pack/day for 10.0 years (10.0 ttl pk-yrs)     Types: Cigarettes     Start date: 1996     Quit date: 2001     Years since quittin.9     Passive exposure: Past    Smokeless tobacco: Former   Vaping Use    Vaping status: Never Used   Substance and Sexual Activity    Alcohol use: Not Currently     Alcohol/week: 2.0 standard drinks of alcohol     Comment: Maybe once a month    Drug use: Never    Sexual activity: Yes     Partners: Male     Birth control/protection: Tubal ligation, Surgical        OB History          3    Para   3    Term   3            AB        Living   3         SAB        IAB        Ectopic        Molar        Multiple        Live Births   3                 Allergies   Allergen Reactions    Bupropion Palpitations     POSSIBLE     "Adhesive Tape Rash     SWELLS. Localized if left on for extended periods of time        Review of Systems  Review of systems as mentioned HPI otherwise negative    Objective   Blood pressure 118/82, pulse 105, temperature 97 °F (36.1 °C), temperature source Oral, height 170.2 cm (67.01\"), weight 127 kg (280 lb), SpO2 96%, not currently breastfeeding.     Physical Exam  Vitals reviewed.   Constitutional:       Appearance: She is obese.   HENT:      Head: Normocephalic and atraumatic.      Nose: Nose normal.      Mouth/Throat:      Mouth: Mucous membranes are moist.   Eyes:      Extraocular Movements: Extraocular movements intact.      Pupils: Pupils are equal, round, and reactive to light.   Pulmonary:      Effort: Pulmonary effort is normal. No respiratory distress.   Musculoskeletal:         General: Normal range of motion.      Cervical back: Normal range of motion.   Neurological:      General: No focal deficit present.      Mental Status: She is alert and oriented to person, place, and time.   Psychiatric:         Mood and Affect: Mood normal.         Behavior: Behavior normal.         Thought Content: Thought content normal.         Judgment: Judgment normal.       Breast Exam: On inspection there is changes consistent with mastopexy.  There is underlying scar tissue.  There is some tenderness to palpation in the right lower outer quadrant at the site of the scar.   Exam is unchanged today, 4/25/2025     Left breast with no palpable abnormalities, status post mastopexy.  No skin changes, currently no whelps or areas that are raised or erythematous, no palpable abnormalities, no axillary adenopathy exam is unchanged today, 4/25/2025    I have reexamined the patient and the results are consistent with the previously documented exam. Yelena Simms, ALEXIS      Results from last 7 days   Lab Units 04/23/25  1003   WBC 10*3/mm3 9.48   NEUTROS ABS 10*3/mm3 6.11   HEMOGLOBIN g/dL 13.7   HEMATOCRIT % 42.6 "   PLATELETS 10*3/mm3 367     Results from last 7 days   Lab Units 04/23/25  1003   SODIUM mmol/L 142   POTASSIUM mmol/L 5.0   CHLORIDE mmol/L 103   CO2 mmol/L 28.9   BUN mg/dL 10   CREATININE mg/dL 0.69   CALCIUM mg/dL 9.7   ALBUMIN g/dL 4.2   BILIRUBIN mg/dL <0.2   ALK PHOS U/L 109   ALT (SGPT) U/L 23   AST (SGOT) U/L 20   GLUCOSE mg/dL 92             No radiology results for the last 30 days.     Assessment & Plan     *Ductal carcinoma in situ of the right breast  ER +85% strong, PA +85% strong, intermediate grade  Status post right breast lumpectomy and bilateral breast reduction  Pathology showed DCIS measuring 10 mm, margins negative  Tamoxifen started September 2022.  She continues to be compliant with tamoxifen.  Reporting some arthralgias.  Up-to-date on screening mammogram and screening mammogram from March 2024 reviewed and benign.  Repeat mammogram in March 2025  Continuing tamoxifen with overall very good tolerance  3/17/2025 annual mammogram benign  Patient reviewed 4/25/2025 with ongoing excellent tolerance to tamoxifen which will be continued    *Anxiety/depression  She is currently on Effexor to help with anxiety  Seeing the couch to help with worsening of anxiety  Supportive oncology referral made  Patient reports an attempt to decrease her medications she has recently tapered off Vyvanse and Ativan and is overall feeling very well  Mood reasonably well-controlled on current medications but she did have significant anxiety with the recent lab work and a tick bite which resulted in a nonhealing wound on the left thigh.    *Hotflashes  Utilizing gabapentin 300 mg at night with good benefit.  She is requesting a refill today.    *Bilateral elbow pain  Chronic and unchanged  Could be positional   Improved    *Obesity  Body mass index is 43.84 kg/m².   Gained significant amount of weight since her last visit.  She wants to go on weight loss medications but insurance is not approving.  Recommend that she  discuss with her PCP about compounding.  We also discussed regular exercises and dietary modifications.  During the episodes of anxiety patient tends to eat more resulting in weight gain.    *Abnormal LFTs  New in July 2024.  Lab values from 7/12/2024 and 7/20/2024 reviewed and LFTs have been abnormal at both visits.  Liver function studies improved today with AST of 64, ALT 70  Most recent labs 4/23/2025 with normal liver function studies with AST 20, ALT 23    *Sporadic whelps on her left breast  Patient sent messages via [facility]  Triage visit 10/24/2024 with reports of sporadic red spots and whelps that itch on her left breast that spontaneously occur but also resolved within minutes.  She denies any topical changes with skin care or laundry detergent  No palpable abnormalities in her left breast I reassured the patient I have no concerns for malignancy in her left breast  The patient has had resolution of whelps and has not yet seen dermatology.    PLAN:  Continue tamoxifen 20 mg daily.  This was refilled today  Screening mammogram March 2025 was negative.  Repeat mammography in March 2026  Continue Effexor 225mg nightly, she is planning to begin tapering.   Continue gabapentin 300 mg nightly for hot flashes, this was refilled today  Return in 6 months for CBC, CMP, MD follow-up with Dr. Sharma    Patient is on a high risk medication requiring close monitoring for toxicity.    Yelena Simms, ALEXIS  04/25/2025

## 2025-05-02 RX ORDER — LOSARTAN POTASSIUM 50 MG/1
50 TABLET ORAL DAILY
Qty: 30 TABLET | Refills: 3 | Status: SHIPPED | OUTPATIENT
Start: 2025-05-02

## 2025-05-09 DIAGNOSIS — G47.33 OSA (OBSTRUCTIVE SLEEP APNEA): ICD-10-CM

## 2025-05-09 DIAGNOSIS — I10 ESSENTIAL HYPERTENSION: ICD-10-CM

## 2025-05-09 DIAGNOSIS — E66.813 CLASS 3 SEVERE OBESITY WITH BODY MASS INDEX (BMI) OF 45.0 TO 49.9 IN ADULT, UNSPECIFIED OBESITY TYPE, UNSPECIFIED WHETHER SERIOUS COMORBIDITY PRESENT: ICD-10-CM

## 2025-05-09 DIAGNOSIS — E66.01 CLASS 3 SEVERE OBESITY WITH BODY MASS INDEX (BMI) OF 45.0 TO 49.9 IN ADULT, UNSPECIFIED OBESITY TYPE, UNSPECIFIED WHETHER SERIOUS COMORBIDITY PRESENT: ICD-10-CM

## 2025-05-22 DIAGNOSIS — D05.11 DUCTAL CARCINOMA IN SITU (DCIS) OF RIGHT BREAST: Primary | ICD-10-CM

## 2025-05-22 RX ORDER — GABAPENTIN 300 MG/1
300 CAPSULE ORAL
Qty: 90 CAPSULE | Refills: 0 | OUTPATIENT
Start: 2025-05-22

## 2025-05-22 RX ORDER — VENLAFAXINE HYDROCHLORIDE 150 MG/1
150 CAPSULE, EXTENDED RELEASE ORAL EVERY MORNING
Qty: 90 CAPSULE | Refills: 1 | Status: SHIPPED | OUTPATIENT
Start: 2025-05-22

## 2025-05-22 RX ORDER — VENLAFAXINE HYDROCHLORIDE 75 MG/1
75 CAPSULE, EXTENDED RELEASE ORAL DAILY
Qty: 90 CAPSULE | Refills: 1 | Status: SHIPPED | OUTPATIENT
Start: 2025-05-22

## 2025-05-22 RX ORDER — GABAPENTIN 600 MG/1
300 TABLET ORAL
Qty: 45 TABLET | Refills: 0 | Status: SHIPPED | OUTPATIENT
Start: 2025-05-22

## 2025-05-23 RX ORDER — BENZONATATE 100 MG/1
100 CAPSULE ORAL 3 TIMES DAILY PRN
Qty: 30 CAPSULE | Refills: 0 | Status: SHIPPED | OUTPATIENT
Start: 2025-05-23

## 2025-05-23 RX ORDER — ALBUTEROL SULFATE 0.83 MG/ML
2.5 SOLUTION RESPIRATORY (INHALATION) EVERY 4 HOURS PRN
Qty: 75 ML | Refills: 1 | Status: SHIPPED | OUTPATIENT
Start: 2025-05-23

## 2025-06-25 DIAGNOSIS — G47.33 OSA (OBSTRUCTIVE SLEEP APNEA): ICD-10-CM

## 2025-06-25 DIAGNOSIS — I10 ESSENTIAL HYPERTENSION: ICD-10-CM

## 2025-06-25 DIAGNOSIS — E66.813 CLASS 3 SEVERE OBESITY WITH BODY MASS INDEX (BMI) OF 45.0 TO 49.9 IN ADULT, UNSPECIFIED OBESITY TYPE, UNSPECIFIED WHETHER SERIOUS COMORBIDITY PRESENT: ICD-10-CM

## 2025-07-07 RX ORDER — PRAVASTATIN SODIUM 20 MG
20 TABLET ORAL NIGHTLY
Qty: 90 TABLET | Refills: 3 | Status: SHIPPED | OUTPATIENT
Start: 2025-07-07

## 2025-07-14 DIAGNOSIS — D05.11 DUCTAL CARCINOMA IN SITU (DCIS) OF RIGHT BREAST: ICD-10-CM

## 2025-07-15 RX ORDER — GABAPENTIN 600 MG/1
300 TABLET ORAL
Qty: 45 TABLET | Refills: 0 | Status: SHIPPED | OUTPATIENT
Start: 2025-07-15

## 2025-08-10 DIAGNOSIS — E66.813 CLASS 3 SEVERE OBESITY WITH BODY MASS INDEX (BMI) OF 45.0 TO 49.9 IN ADULT, UNSPECIFIED OBESITY TYPE, UNSPECIFIED WHETHER SERIOUS COMORBIDITY PRESENT: ICD-10-CM

## 2025-08-10 DIAGNOSIS — I10 ESSENTIAL HYPERTENSION: ICD-10-CM

## 2025-08-10 DIAGNOSIS — G47.33 OSA (OBSTRUCTIVE SLEEP APNEA): ICD-10-CM

## 2025-08-11 RX ORDER — SEMAGLUTIDE 1.7 MG/.75ML
INJECTION, SOLUTION SUBCUTANEOUS
Qty: 3 ML | Refills: 2 | OUTPATIENT
Start: 2025-08-11

## 2025-08-29 RX ORDER — LOSARTAN POTASSIUM 50 MG/1
50 TABLET ORAL DAILY
Qty: 30 TABLET | Refills: 3 | Status: SHIPPED | OUTPATIENT
Start: 2025-08-29

## (undated) DEVICE — NDL HYPO ECLPS SFTY 22G 1 1/2IN

## (undated) DEVICE — MAT FLR ABSORBENT LG 4FT 10 2.5FT

## (undated) DEVICE — TUBING, SUCTION, 1/4" X 10', STRAIGHT: Brand: MEDLINE

## (undated) DEVICE — LAG MINOR PROCEDURE: Brand: MEDLINE INDUSTRIES, INC.

## (undated) DEVICE — LEGGINGS, PAIR, 31X48, STERILE: Brand: MEDLINE

## (undated) DEVICE — STPLR SKIN VISISTAT WD 35CT

## (undated) DEVICE — ELECTRD BLD EZ CLN MOD XLNG 2.75IN

## (undated) DEVICE — SPNG LAP 18X18IN LF STRL PK/5

## (undated) DEVICE — TRAP FLD MINIVAC MEGADYNE 100ML

## (undated) DEVICE — LN SMPL CO2 SHTRM SD STREAM W/M LUER

## (undated) DEVICE — PROB ABL ENDOMTRL NOVASURE/G4 W/SURESND

## (undated) DEVICE — Device

## (undated) DEVICE — SUT SILK 2/0 FS BLK 18IN 685G

## (undated) DEVICE — DRSNG SURESITE WNDW 4X4.5

## (undated) DEVICE — SUT MNCRYL 3/0 PS2 18IN MCP497G

## (undated) DEVICE — MARKR SKIN W/RULR AND LBL

## (undated) DEVICE — 3M™ STERI-STRIP™ REINFORCED ADHESIVE SKIN CLOSURES, R1547, 1/2 IN X 4 IN (12 MM X 100 MM), 6 STRIPS/ENVELOPE: Brand: 3M™ STERI-STRIP™

## (undated) DEVICE — PATIENT RETURN ELECTRODE, SINGLE-USE, CONTACT QUALITY MONITORING, ADULT, WITH 9FT CORD, FOR PATIENTS WEIGING OVER 33LBS. (15KG): Brand: MEGADYNE

## (undated) DEVICE — GLV SURG BIOGEL LTX PF 6 1/2

## (undated) DEVICE — CANN O2 ETCO2 FITS ALL CONN CO2 SMPL A/ 7IN DISP LF

## (undated) DEVICE — PK UNIV COMPL 40

## (undated) DEVICE — TBG PENCL TELESCP MEGADYNE SMOKE EVAC 10FT

## (undated) DEVICE — PAD,ABDOMINAL,8"X10",ST,LF: Brand: MEDLINE

## (undated) DEVICE — SENSR O2 OXIMAX FNGR A/ 18IN NONSTR

## (undated) DEVICE — KT ORCA ORCAPOD DISP STRL

## (undated) DEVICE — JACKSON-PRATT 100CC BULB RESERVOIR: Brand: CARDINAL HEALTH

## (undated) DEVICE — OSC HYSTEROSCOPY: Brand: MEDLINE INDUSTRIES, INC.

## (undated) DEVICE — PROXIMATE RH ROTATING HEAD SKIN STAPLERS (35 WIDE) CONTAINS 35 STAINLESS STEEL STAPLES: Brand: PROXIMATE

## (undated) DEVICE — TRAP,MUCUS SPECIMEN, 80CC: Brand: MEDLINE

## (undated) DEVICE — EXTRCT STPL SKIN STRL BX/12

## (undated) DEVICE — SUT ETHLN 4/0 PS2 PLSTC 1667G

## (undated) DEVICE — BIOPATCH™ ANTIMICROBIAL DRESSING WITH CHLORHEXIDINE GLUCONATE IS A HYDROPHILLIC POLYURETHANE ABSORPTIVE FOAM WITH CHLORHEXIDINE GLUCONATE (CHG) WHICH INHIBITS BACTERIAL GROWTH UNDER THE DRESSING. THE DRESSING IS INTENDED TO BE USED TO ABSORB EXUDATE, COVER A WOUND CAUSED BY VASCULAR AND NONVASCULAR PERCUTANEOUS MEDICAL DEVICES DURING SURGERY, AS WELL AS REDUCE LOCAL INFECTION AND COLONIZATION OF MICROORGANISMS.: Brand: BIOPATCH

## (undated) DEVICE — ADAPT CLN BIOGUARD AIR/H2O DISP

## (undated) DEVICE — GLV SURG BIOGEL LTX PF 8

## (undated) DEVICE — SYR LL TP 10ML STRL

## (undated) DEVICE — SNAR POLYP SENSATION STDOVL 27 240 BX40

## (undated) DEVICE — APPL CHLORAPREP HI/LITE 26ML ORNG

## (undated) DEVICE — BNDG ELAS ELITE V/CLOSE 6IN 5YD LF STRL